# Patient Record
Sex: FEMALE | Race: WHITE | NOT HISPANIC OR LATINO | Employment: OTHER | URBAN - METROPOLITAN AREA
[De-identification: names, ages, dates, MRNs, and addresses within clinical notes are randomized per-mention and may not be internally consistent; named-entity substitution may affect disease eponyms.]

---

## 2017-08-10 ENCOUNTER — HOSPITAL ENCOUNTER (OUTPATIENT)
Dept: MAMMOGRAPHY | Facility: CLINIC | Age: 62
Discharge: HOME | End: 2017-08-10
Payer: COMMERCIAL

## 2017-08-10 ENCOUNTER — TRANSCRIBE ORDERS (OUTPATIENT)
Dept: MAMMOGRAPHY | Facility: CLINIC | Age: 62
End: 2017-08-10

## 2017-08-10 DIAGNOSIS — Z12.31 ENCOUNTER FOR SCREENING MAMMOGRAM FOR MALIGNANT NEOPLASM OF BREAST: ICD-10-CM

## 2017-08-10 PROCEDURE — 77063 BREAST TOMOSYNTHESIS BI: CPT

## 2017-08-10 PROCEDURE — G0202 SCR MAMMO BI INCL CAD: HCPCS

## 2017-09-05 ENCOUNTER — ALLSCRIPTS OFFICE VISIT (OUTPATIENT)
Dept: OTHER | Facility: OTHER | Age: 62
End: 2017-09-05

## 2018-01-13 NOTE — PROGRESS NOTES
Assessment    1  Never a smoker   2  Bilateral hip pain (719 45) (M25 551,M25 552)   3  History of hip replacement (V43 64) (Z96 649)   4  Visit for gynecologic examination (V72 31) (Z01 419)   5  Arthritis (716 90) (M19 90)    Plan  Arthritis, Bilateral hip pain    · *1 - [facility]  Consult  Status: Hold  For - Scheduling  Requested for: [id number]  Care Summary provided  : Yes  Need for diphtheria-tetanus-pertussis (Tdap) vaccine    · Adacel 5-2-15 5 LF-MCG/0 5 Intramuscular Suspension  Visit for gynecologic examination    · (Q) THINPREP TIS PAP RFX HPV; Status:Active - Retrospective By Protocol  Authorization; Requested for:[id number];   : Postmenopausal   · Follow Up in 2 Years Evaluation and Treatment  Follow-up  Status: Hold For - Scheduling   Requested for: [id number]   · Follow-up visit in 1 year Evaluation and Treatment  Follow-up  Status: Hold For -  Scheduling  Requested for: [id number]    Discussion/Summary  health maintenance visit healthy adult female Currently, she eats an adequate diet and has an inadequate exercise regimen  the risks and benefits of cervical cancer screening were discussed Pap test with reflex HPV testing was done today Breast cancer screening: the risks and benefits of breast cancer screening were discussed, self breast exam technique was taught, monthly self breast exam was advised, mammogram has been ordered and the next mammogram is due 8/16/2016  Colorectal cancer screening: the risks and benefits of colorectal cancer screening were discussed and the next colonoscopy is due 2025  Osteoporosis screening: bone mineral density testing is not indicated  The risks and benefits of immunizations were discussed, immunizations are needed and immunizations will be given as outlined in the orders   Advice and education were given regarding nutrition, aerobic exercise, weight bearing exercise, weight loss, calcium supplements and vitamin D supplements  Patient discussion: discussed with the patient  Gyn exam - PAP done, SBE exam advised monthly, encouraged to take calcium with d 600 mg twice daily  weight bearing exercise, weight loss  arthritis and s/p hip replacement - see BRITTA Mata PT for hip pain and initiating exercise and weight loss  tdap - today  labs not covered per insurance, looking for new insurance  colon due 2025  pap today  mammo appt 8/16    f/u 1 year for physical or sooner if needed  Chief Complaint  Pt presents to the office for her annual GYN visit  colon due 09/29/25  mammo due 08/07/16      History of Present Illness  HM, Adult Female: The patient is being seen for a gynecology evaluation  The last health maintenance visit was unknown  General Health: The patient's health since the last visit is described as good  She does not have regular dental visits  She denies vision problems  Vision care includes wearing glasses and no recent eye examination  She denies hearing loss  Lifestyle:  She consumes a diverse and healthy diet  She exercises regularly  She does not use tobacco  She denies alcohol use  She denies drug use  Reproductive health: the patient is postmenopausal   she reports normal menses  pregnancy history: G 0  Screening:   HPI: Patient here for gyn exam      c/o having b/l hips replaced one in 2013, one in 2014  In the past few months her hips are always achy, hurts to walk  So she doesn't  Continue to gain weight  Trying to watch her diet with little success in weight loss  Review of Systems    Constitutional: No fever, no chills, feels well, no tiredness, no recent weight gain or weight loss  Eyes: No complaints of eye pain, no red eyes, no eyesight problems, no discharge, no dry eyes, no itching of eyes  ENT: no complaints of earache, no loss of hearing, no nose bleeds, no nasal discharge, no sore throat, no hoarseness     Cardiovascular: No complaints of slow heart rate, no fast heart rate, no chest pain, no palpitations, no leg claudication, no lower extremity edema  Respiratory: No complaints of shortness of breath, no wheezing, no cough, no SOB on exertion, no orthopnea, no PND  Gastrointestinal: No complaints of abdominal pain, no constipation, no nausea or vomiting, no diarrhea, no bloody stools  Genitourinary: No complaints of dysuria, no incontinence, no pelvic pain, no dysmenorrhea, no vaginal discharge or bleeding  Musculoskeletal: arthralgias, but as noted in HPI  Integumentary: No complaints of skin rash or lesions, no itching, no skin wounds, no breast pain or lump  Neurological: No complaints of headache, no confusion, no convulsions, no numbness, no dizziness or fainting, no tingling, no limb weakness, no difficulty walking  Psychiatric: Not suicidal, no sleep disturbance, no anxiety or depression, no change in personality, no emotional problems  Endocrine: No complaints of proptosis, no hot flashes, no muscle weakness, no deepening of the voice, no feelings of weakness  Hematologic/Lymphatic: No complaints of swollen glands, no swollen glands in the neck, does not bleed easily, does not bruise easily  Active Problems    1  Allergic rhinitis due to pollen (477 0) (J30 1)   2  Arthritis (716 90) (M19 90)   3  's permit physical examination (V70 3) (Z02 4)   4  Encounter for screening for malignant neoplasm of colon (V76 51) (Z12 11)   5  History of hip replacement (V43 64) (Z96 649)   6  Obesity (278 00) (E66 9)   7   Visit for screening mammogram (6 12) (Z12 31)    Past Medical History    · History of Blepharitis of eyelid of left eye (373 00) (H01 006)   · History of Neck strain (847 0) (S16 1XXA)    Surgical History    · History of Total Hip Replacement   · History of Total Hip Replacement   · History of Tubal Ligation    Family History  Mother    · Family history of malignant neoplasm of kidney (V16 51) (Z80 51)   · Patient's mother is  (V19 8) (Z84 89)  Father    · Family history of lung cancer (V16 1) (Z80 1)   · Patient's father is  (V24 11) (Z80 80)    Social History    · Always uses seat belt   · Daily caffeine consumption, 1 serving a day   · coffee   · Has smoke detectors   · Never a smoker   · No drug use   · Non drinker / no alcohol use    Current Meds   1  No Reported Medications Recorded    Allergies    1  No Known Drug Allergies    2  No Known Environmental Allergies   3  No Known Food Allergies    Vitals   Recorded: 92BHN0958 27:22NU   Systolic 383, RUE, Sitting   Diastolic 64, RUE, Sitting   Heart Rate 76, R Radial   Pulse Quality Normal, R Radial   Respiration Quality Normal   Respiration 16   Height 5 ft 1 25 in   Weight 191 lb 6 4 oz   BMI Calculated 35 87   BSA Calculated 1 86     Physical Exam    Constitutional   General appearance: No acute distress, well appearing and well nourished  Neck   Neck: Supple, symmetric, trachea midline, no masses  Thyroid: Normal, no thyromegaly  Pulmonary   Respiratory effort: No increased work of breathing or signs of respiratory distress  Palpation of chest: Normal     Auscultation of lungs: Clear to auscultation  Cardiovascular   Palpation of heart: Normal PMI, no thrills  Auscultation of heart: Normal rate and rhythm, normal S1 and S2, no murmurs  Pedal pulses: 2+ bilaterally  Examination of extremities for edema and/or varicosities: Normal     Chest   Breasts: Normal, no dimpling or skin changes appreciated  Palpation of breasts and axillae: Normal, no masses palpated  Abdomen   Abdomen: Non-tender, no masses  Liver and spleen: No hepatomegaly or splenomegaly  Examination for hernias: No hernia appreciated  Genitourinary   External genitalia and vagina: Normal, no lesions appreciated  Cervix: Normal, no lesions  Uterus: Normal size, no tenderness, no masses  Adnexa/Parametria: Normal, no masses or tenderness      Lymphatic   Palpation of lymph nodes in neck: No lymphadenopathy  Palpation of lymph nodes in axillae: No lymphadenopathy  Palpation of lymph nodes in groin: No lymphadenopathy  Musculoskeletal   Gait and station: Normal     Skin   Skin and subcutaneous tissue: Normal without rashes or lesions  Palpation of skin and subcutaneous tissue: Normal turgor  Neurologic   Cranial nerves: Cranial nerves II-XII intact  Reflexes: 2+ and symmetric  Psychiatric   Judgment and insight: Normal     Mood and affect: Normal        Health Management  Encounter for screening for malignant neoplasm of colon   COLONOSCOPY; every 10 years; Last [id number]; Next Due: [id number]; Active    Signatures   Electronically signed by : DEREK Dietrich;  Aug  9 2016 10:56AM EST                       (Author)    Electronically signed by : GAYATHRI Leigh ; Aug  9 2016 11:01AM EST                       (Author)

## 2018-01-15 NOTE — MISCELLANEOUS
Message  Return to work or school:        Patient is able to exercise at [place] [address] gym  Kirsten CHAVEZ  Signatures   Electronically signed by :  Adrianna oDuglass, ; Sep  1 2017  2:04PM EST                       (Author)

## 2018-01-22 VITALS — WEIGHT: 187.4 LBS | BODY MASS INDEX: 35.38 KG/M2 | HEIGHT: 61 IN

## 2018-04-11 ENCOUNTER — OFFICE VISIT (OUTPATIENT)
Dept: FAMILY MEDICINE CLINIC | Facility: CLINIC | Age: 63
End: 2018-04-11
Payer: COMMERCIAL

## 2018-04-11 VITALS
HEART RATE: 72 BPM | HEIGHT: 61 IN | RESPIRATION RATE: 16 BRPM | BODY MASS INDEX: 35.74 KG/M2 | DIASTOLIC BLOOD PRESSURE: 80 MMHG | SYSTOLIC BLOOD PRESSURE: 122 MMHG | WEIGHT: 189.3 LBS

## 2018-04-11 DIAGNOSIS — M17.12 OSTEOARTHRITIS OF LEFT KNEE, UNSPECIFIED OSTEOARTHRITIS TYPE: Primary | ICD-10-CM

## 2018-04-11 DIAGNOSIS — M77.8 THUMB TENDONITIS: ICD-10-CM

## 2018-04-11 PROCEDURE — 99213 OFFICE O/P EST LOW 20 MIN: CPT | Performed by: PHYSICIAN ASSISTANT

## 2018-04-11 NOTE — PROGRESS NOTES
Assessment/Plan:    1  Osteoarthritis of left knee, unspecified osteoarthritis type    - start walking, lose weight, count calories, see if you can start PT with [facility]  Consider payment plan  - Ambulatory referral to Physical Therapy; Future    2  Thumb tendonitis    - rest hand, ice after work  - wear a thumb spica splint    F/u full physical  F/u as needed          Subjective:   Chief Complaint   Patient presents with    Left knee cracking      Patient ID: David Bakrer is a 65 y o  female  Patient here c/o bad knee cracking for as long as she can remember, left  Not swollen,  just "very loud cracking"  The other day at [employer] felt it almost "give out" underneath her  Knows "I weight too much, can't afford a gym, cant afford anything, so I am doing nothing"    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  Noticed left hand thumb aches with use  Dull ache, worse with movement  Takes no OTC, doing nothing to make it better  R handed  Denies trauma  Denies swelling, bruising  The following portions of the patient's history were reviewed and updated as appropriate: allergies, current medications, past family history, past medical history, past social history, past surgical history and problem list     Past Medical History:   Diagnosis Date    Blepharitis of eyelid of left eye      Past Surgical History:   Procedure Laterality Date    DENTAL SURGERY      [place] teeth extraction    HIP ARTHROPLASTY      Right    HIP ARTHROSCOPY      Left    TUBAL LIGATION       Family History   Problem Relation Age of Onset    Kidney cancer Mother     Lung cancer Father     Mental illness Cousin     Substance Abuse Neg Hx      Social History     Social History    Marital status: Single     Spouse name: N/A    Number of children: N/A    Years of education: N/A     Occupational History    Not on file       Social History Main Topics    Smoking status: Never Smoker    Smokeless tobacco: Never Used    Alcohol use No  Drug use: No    Sexual activity: Not on file     Other Topics Concern    Not on file     Social History Narrative    No narrative on file     No current outpatient prescriptions on file  Review of Systems          Objective:    Vitals:    04/11/18 1100   BP: 122/80   BP Location: Left arm   Patient Position: Sitting   Cuff Size: Standard   Pulse: 72   Resp: 16   Weight: 85 9 kg (189 lb 4 8 oz)   Height: 5' 1" (1 549 m)        Physical Exam   Constitutional: She is oriented to person, place, and time  She appears well-developed and well-nourished  Cardiovascular: Normal rate, regular rhythm and normal heart sounds  Pulmonary/Chest: Effort normal and breath sounds normal    Musculoskeletal: Normal range of motion  She exhibits no edema, tenderness or deformity  L knee full ROM, ligaments intact, negative mcmurrays, no crepitus, effusion, no "cracking"  L hand full ROM, full strength no abnormality  Positive Finklesteins   Neurological: She is alert and oriented to person, place, and time  Skin: Skin is warm  Psychiatric: She has a normal mood and affect

## 2018-04-16 ENCOUNTER — EVALUATION (OUTPATIENT)
Dept: PHYSICAL THERAPY | Facility: CLINIC | Age: 63
End: 2018-04-16
Payer: COMMERCIAL

## 2018-04-16 DIAGNOSIS — M17.12 OSTEOARTHRITIS OF LEFT KNEE, UNSPECIFIED OSTEOARTHRITIS TYPE: Primary | ICD-10-CM

## 2018-04-16 PROCEDURE — G8979 MOBILITY GOAL STATUS: HCPCS

## 2018-04-16 PROCEDURE — G8978 MOBILITY CURRENT STATUS: HCPCS

## 2018-04-16 PROCEDURE — 97161 PT EVAL LOW COMPLEX 20 MIN: CPT

## 2018-04-16 PROCEDURE — 97110 THERAPEUTIC EXERCISES: CPT

## 2018-04-16 NOTE — PROGRESS NOTES
PT Evaluation     Today's date: 2018  Patient name: Karla Piedra  : 1955  MRN: [id number]  Referring provider: Kristy Whipple PA-C  Dx:   Encounter Diagnosis     ICD-10-CM    1  Osteoarthritis of left knee, unspecified osteoarthritis type M17 12 Ambulatory referral to Physical Therapy                  Assessment/Plan   Patient presents to PT with L knee Oa  Patient with decreased L knee strength, decreased hip strength, and increased pain with functional activities  Patient may benefit from skilled PT in order to address the above impairments, progress patient toward pain-free prior level of function, and establish home exercise program  Thank you for this referral   ST  Improve knee strength by 1/2 grade within 3 weeks  2  Increase flexibility by 25% within 3 weeks  LT  Pt will be independent with HEP by discharge  2  Pt will return to pain-free PLOF by discharge  Plan: 2-3x/week for 4-6 weeks      Subjective   Pt reports her L knee started to bother her last month  Pt reports noticing some cracking in the knee  Notes weakness in the knee     Pain:   Worst: 9/10   Best: 0/10   Average: 7/10   Aggravating factors: walking  Alleviating factors: resting  Previous injury: none  Imaging: none  Goals: get back to walking, lose weight      Objective   Strength testing L:  Extension: 4/5  Flexion: 3+/5    ROM:  Flexion: 127 deg B/L  Extension: hyper 1 deg B/L    Kernig sign: negative  Ely's sign: positive    Palpation: tenderness along medial and lateral joint lines, tenderness over patellar tendon    Patellar mobility: normal mobility, painful medial/lateral    Trendelenburg L: positive for trunk lean  Trendelenburg R: negative        Precautions: B/L hip replacements    Daily Treatment Diary     Manual              PRN                                                                     Exercise Diary              Quad sets 5"x10            SAQ 2x10            Standing hamstring curls 2x10            Standing abduction 2x10            Bike NV            SLR NV            LAQ NV                                                                                                                                                                                         Modalities  4/16            CP PRN

## 2018-04-18 ENCOUNTER — TELEPHONE (OUTPATIENT)
Dept: FAMILY MEDICINE CLINIC | Facility: CLINIC | Age: 63
End: 2018-04-18

## 2018-04-20 ENCOUNTER — APPOINTMENT (OUTPATIENT)
Dept: PHYSICAL THERAPY | Facility: CLINIC | Age: 63
End: 2018-04-20
Payer: COMMERCIAL

## 2018-04-25 ENCOUNTER — OFFICE VISIT (OUTPATIENT)
Dept: PHYSICAL THERAPY | Facility: CLINIC | Age: 63
End: 2018-04-25
Payer: COMMERCIAL

## 2018-04-25 DIAGNOSIS — M17.12 OSTEOARTHRITIS OF LEFT KNEE, UNSPECIFIED OSTEOARTHRITIS TYPE: Primary | ICD-10-CM

## 2018-04-25 PROCEDURE — 97110 THERAPEUTIC EXERCISES: CPT

## 2018-04-25 PROCEDURE — 97112 NEUROMUSCULAR REEDUCATION: CPT

## 2018-04-25 NOTE — PROGRESS NOTES
Daily Note     Today's date: 2018  Patient name: Santy Goel  : 1955  MRN: [id number]  Referring provider: Luzmaria Gu PA-C  Dx:   Encounter Diagnosis     ICD-10-CM    1  Osteoarthritis of left knee, unspecified osteoarthritis type M17 12                   Subjective: Pt reports she is feeling pretty good  Objective: See treatment diary below      Assessment: Pt tolerating addition of new therex  Notes improvement in knee mobility and pain after bike  Pt will continue to benefit from skilled PT  Plan: Continue per plan of care        Precautions: B/L hip replacements    Daily Treatment Diary     Manual             PRN                                                                     Exercise Diary             Quad sets 5"x10 5" x15           SAQ 2x10 2x10           Standing hamstring curls 2x10 2x10 1#           Standing abduction 2x10 2x10 1#           Bike NV 5'           SLR NV NV           LAQ NV NV           Step ups  2x10 4"            Heel slides  x10                                                                                                                                                              Modalities             CP PRN PRN

## 2018-04-27 ENCOUNTER — OFFICE VISIT (OUTPATIENT)
Dept: PHYSICAL THERAPY | Facility: CLINIC | Age: 63
End: 2018-04-27
Payer: COMMERCIAL

## 2018-04-27 DIAGNOSIS — M17.12 OSTEOARTHRITIS OF LEFT KNEE, UNSPECIFIED OSTEOARTHRITIS TYPE: Primary | ICD-10-CM

## 2018-04-27 PROCEDURE — 97112 NEUROMUSCULAR REEDUCATION: CPT

## 2018-04-27 PROCEDURE — 97110 THERAPEUTIC EXERCISES: CPT

## 2018-04-27 NOTE — PROGRESS NOTES
Daily Note     Today's date: 2018  Patient name: Evan Collins  : 1955  MRN: [id number]  Referring provider: Megan Ram PA-C  Dx:   Encounter Diagnosis     ICD-10-CM    1  Osteoarthritis of left knee, unspecified osteoarthritis type M17 12                   Subjective: Pt reports her L knee feels sore with rainy weather today  Objective: See treatment diary below      Assessment: New exercises and exercise progressions performed w/o complaint  Will monitor  Pt will continue to benefit from skilled PT  Plan: Continue per plan of care        Precautions: B/L hip replacements    Daily Treatment Diary     Manual            PRN                                                                     Exercise Diary            Quad sets 5"x10 5" x15 5"x20          SAQ 2x10 2x10 2x10          Standing hamstring curls 2x10 2x10 1# 2x10  1#          Standing abduction 2x10 2x10 1# 2x10  1#          Bike NV 5' 5'          SLR NV NV x10          LAQ NV NV 2x10          Step ups  2x10 4"  2x10  4"          Heel slides  x10 2x10                                                                                                                                                             Modalities            CP PRN PRN PRN

## 2018-05-03 ENCOUNTER — OFFICE VISIT (OUTPATIENT)
Dept: PHYSICAL THERAPY | Facility: CLINIC | Age: 63
End: 2018-05-03
Payer: COMMERCIAL

## 2018-05-03 DIAGNOSIS — M17.12 OSTEOARTHRITIS OF LEFT KNEE, UNSPECIFIED OSTEOARTHRITIS TYPE: Primary | ICD-10-CM

## 2018-05-03 PROCEDURE — 97112 NEUROMUSCULAR REEDUCATION: CPT

## 2018-05-03 PROCEDURE — 97110 THERAPEUTIC EXERCISES: CPT

## 2018-05-03 NOTE — PROGRESS NOTES
Daily Note     Today's date: 5/3/2018  Patient name: Catracho Ontiveros  : 1955  MRN: [id number]  Referring provider: Camila Reid PA-C  Dx:   Encounter Diagnosis     ICD-10-CM    1  Osteoarthritis of left knee, unspecified osteoarthritis type M17 12                   Subjective: Pt reports her L knee is feeling pretty good  Notes some hip pain today  Objective: See treatment diary below      Assessment: Pt tolerating progression of current exercise program without complaints of pain  Declined CP at this time  Pt will continue to benefit from skilled PT  Plan: Continue per plan of care        Precautions: B/L hip replacements    Daily Treatment Diary     Manual  4/16 4/25 4/27 5/3         PRN                                                                     Exercise Diary  4/16 4/25 4/27 5/3         Quad sets 5"x10 5" x15 5"x20 5"x20         SAQ 2x10 2x10 2x10 3x10 1#         Standing hamstring curls 2x10 2x10 1# 2x10  1# 3x10 1#         Standing abduction 2x10 2x10 1# 2x10  1# 3x10 1#         Bike NV 5' 5' 6'         SLR NV NV x10 2x8         LAQ NV NV 2x10 3x10         Step ups  2x10 4"  2x10  4" 2x10 6"         Heel slides  x10 2x10 x25                                                                                                                                                            Modalities  4/16 4/25 4/27 5/3         CP PRN PRN PRN PRN

## 2018-05-10 ENCOUNTER — OFFICE VISIT (OUTPATIENT)
Dept: FAMILY MEDICINE CLINIC | Facility: CLINIC | Age: 63
End: 2018-05-10
Payer: COMMERCIAL

## 2018-05-10 VITALS
HEART RATE: 64 BPM | BODY MASS INDEX: 35.8 KG/M2 | RESPIRATION RATE: 16 BRPM | WEIGHT: 189.6 LBS | DIASTOLIC BLOOD PRESSURE: 72 MMHG | TEMPERATURE: 98.3 F | HEIGHT: 61 IN | SYSTOLIC BLOOD PRESSURE: 112 MMHG

## 2018-05-10 DIAGNOSIS — J02.9 SORE THROAT: ICD-10-CM

## 2018-05-10 DIAGNOSIS — R05.9 COUGH: ICD-10-CM

## 2018-05-10 DIAGNOSIS — J30.89 SEASONAL ALLERGIC RHINITIS DUE TO OTHER ALLERGIC TRIGGER: Primary | ICD-10-CM

## 2018-05-10 LAB — S PYO AG THROAT QL: NEGATIVE

## 2018-05-10 PROCEDURE — 99213 OFFICE O/P EST LOW 20 MIN: CPT | Performed by: PHYSICIAN ASSISTANT

## 2018-05-10 PROCEDURE — 87880 STREP A ASSAY W/OPTIC: CPT | Performed by: PHYSICIAN ASSISTANT

## 2018-05-10 RX ORDER — PREDNISONE 20 MG/1
20 TABLET ORAL 2 TIMES DAILY WITH MEALS
Qty: 10 TABLET | Refills: 0 | Status: SHIPPED | OUTPATIENT
Start: 2018-05-10 | End: 2018-07-23 | Stop reason: ALTCHOICE

## 2018-05-10 RX ORDER — OMEGA-3/DHA/EPA/FISH OIL 60 MG-90MG
CAPSULE ORAL 3 TIMES DAILY
COMMUNITY

## 2018-05-10 RX ORDER — CHOLECALCIFEROL (VITAMIN D3) 25 MCG
CAPSULE ORAL DAILY
COMMUNITY
End: 2021-12-10

## 2018-05-10 RX ORDER — ASCORBIC ACID 500 MG
500 TABLET ORAL DAILY
COMMUNITY

## 2018-05-10 RX ORDER — LORATADINE 10 MG/1
10 TABLET ORAL DAILY
Qty: 30 TABLET | Refills: 0 | Status: SHIPPED | OUTPATIENT
Start: 2018-05-10

## 2018-05-10 NOTE — PROGRESS NOTES
Assessment/Plan:    1  Seasonal allergic rhinitis due to other allergic trigger    - acute allergies, start prednisone 20 mg 1 tab twice daily for 5 days, also start Claritin 10 mg once daily until summer  Allergy precautions discussed  Close windows  - predniSONE 20 mg tablet; Take 1 tablet (20 mg total) by mouth 2 (two) times a day with meals  Dispense: 10 tablet; Refill: 0  - loratadine (CLARITIN) 10 mg tablet; Take 1 tablet (10 mg total) by mouth daily  Dispense: 30 tablet; Refill: 0    2  Sore throat    - due to allergies  - POCT rapid strepA - negative     3  Cough    - due to allergies  - Peak flow    f/u for physical  f/u as needed        Subjective:   Chief Complaint   Patient presents with    Cough    Nasal Congestion      Patient ID: Catracho Ontiveros is a 65 y o  female  Tuesday started with coughing lept patient up all night, runny nose off and on, clear mucus, sore throat, swollen glands  "Hit me out of no where"  No fever, chills  Took vitamin and an allergy pills with no relief  Sleeping with windows open recently  The following portions of the patient's history were reviewed and updated as appropriate: allergies, current medications, past family history, past medical history, past social history, past surgical history and problem list     Past Medical History:   Diagnosis Date    Blepharitis of eyelid of left eye      Past Surgical History:   Procedure Laterality Date    DENTAL SURGERY      [place] teeth extraction    HIP ARTHROPLASTY      Right    HIP ARTHROSCOPY      Left    TUBAL LIGATION       Family History   Problem Relation Age of Onset    Kidney cancer Mother     Lung cancer Father     Mental illness Cousin     Substance Abuse Neg Hx      Social History     Social History    Marital status: Single     Spouse name: N/A    Number of children: N/A    Years of education: N/A     Occupational History    Not on file       Social History Main Topics    Smoking status: Never Smoker    Smokeless tobacco: Never Used    Alcohol use No    Drug use: No    Sexual activity: Not on file     Other Topics Concern    Not on file     Social History Narrative    Always uses a seatbelt    Caffeine use- 1 cup of coffee per day    Has smoke detectors           Current Outpatient Prescriptions:     ascorbic acid (VITAMIN C) 500 mg tablet, Take 500 mg by mouth daily, Disp: , Rfl:     Cholecalciferol (VITAMIN D-3) 1000 units CAPS, Take by mouth daily, Disp: , Rfl:     Omega-3 Fatty Acids (FISH OIL) 500 MG CAPS, Take by mouth 3 (three) times a day, Disp: , Rfl:     Review of Systems          Objective:    Vitals:    05/10/18 1224   BP: 112/72   BP Location: Left arm   Patient Position: Sitting   Cuff Size: Adult   Pulse: 64   Resp: 16   Temp: 98 3 °F (36 8 °C)   Weight: 86 kg (189 lb 9 6 oz)   Height: 5' 1" (1 549 m)        Physical Exam   Constitutional: She is oriented to person, place, and time  She appears well-developed and well-nourished  HENT:   Head: Normocephalic and atraumatic  Right Ear: Tympanic membrane, external ear and ear canal normal    Left Ear: Tympanic membrane, external ear and ear canal normal    Nose: Mucosal edema and rhinorrhea present  Mouth/Throat: Oropharynx is clear and moist  No oropharyngeal exudate or posterior oropharyngeal erythema  turbinates pink and boggy with congestion, clear mucus   Cardiovascular: Normal rate, regular rhythm and normal heart sounds  Pulmonary/Chest: Effort normal and breath sounds normal    Lymphadenopathy:     She has no cervical adenopathy  Neurological: She is alert and oriented to person, place, and time  Skin: Skin is warm  Psychiatric: She has a normal mood and affect   Judgment normal

## 2018-07-18 ENCOUNTER — TRANSCRIBE ORDERS (OUTPATIENT)
Dept: ADMINISTRATIVE | Facility: HOSPITAL | Age: 63
End: 2018-07-18

## 2018-07-18 DIAGNOSIS — Z12.31 ENCOUNTER FOR SCREENING MAMMOGRAM FOR MALIGNANT NEOPLASM OF BREAST: Primary | ICD-10-CM

## 2018-07-23 ENCOUNTER — HOSPITAL ENCOUNTER (OUTPATIENT)
Dept: RADIOLOGY | Facility: HOSPITAL | Age: 63
Discharge: HOME | End: 2018-07-23
Payer: COMMERCIAL

## 2018-07-23 ENCOUNTER — OFFICE VISIT (OUTPATIENT)
Dept: FAMILY MEDICINE CLINIC | Facility: CLINIC | Age: 63
End: 2018-07-23
Payer: COMMERCIAL

## 2018-07-23 VITALS
HEART RATE: 75 BPM | SYSTOLIC BLOOD PRESSURE: 122 MMHG | WEIGHT: 198.6 LBS | HEIGHT: 61 IN | RESPIRATION RATE: 16 BRPM | DIASTOLIC BLOOD PRESSURE: 82 MMHG | BODY MASS INDEX: 37.49 KG/M2

## 2018-07-23 DIAGNOSIS — M25.551 HIP PAIN, BILATERAL: Primary | ICD-10-CM

## 2018-07-23 DIAGNOSIS — M25.552 HIP PAIN, BILATERAL: ICD-10-CM

## 2018-07-23 DIAGNOSIS — M25.551 HIP PAIN, BILATERAL: ICD-10-CM

## 2018-07-23 DIAGNOSIS — M25.552 HIP PAIN, BILATERAL: Primary | ICD-10-CM

## 2018-07-23 PROCEDURE — 73523 X-RAY EXAM HIPS BI 5/> VIEWS: CPT

## 2018-07-23 PROCEDURE — 99213 OFFICE O/P EST LOW 20 MIN: CPT | Performed by: PHYSICIAN ASSISTANT

## 2018-07-23 PROCEDURE — 3008F BODY MASS INDEX DOCD: CPT | Performed by: PHYSICIAN ASSISTANT

## 2018-07-23 NOTE — PROGRESS NOTES
Assessment/Plan:    1  Hip pain, bilateral    - get XR per patient request, she will call and schedule with ortho, advised to get CDto take to ortho for their review  advised this may all be degenerative changes in the lumbar sacral region  Walking and stretching is good for this  - XR hip/pelv 2-3 vws left if performed; Future  - XR hip/pelv 2-3 vws right if performed; Future    F/u as scheduled for physical  F/u as needed    Subjective:   Chief Complaint   Patient presents with    Hip Pain     Both side       Patient ID: Efra Velasco is a 65 y o  female  Patient here because has a scheduled appt with [facility], [address] for both hips  Having a pinching sensation in both hips, notices only with walking  Gets more tired with walking  No pain at rest, pain with walking and sitting to stand and stand to sit  Tried Advil and aspirin and tylenol with relief  Have been bothering the patient the entire year  Do not wake patient up from sleep  Limiting exercise and movement  Seems to be getting worse as time goes on  Hurt to touch  Requesting XR before appointment  Had both hips replaced back in 2013, and 2014  Saw PT back in May for left knee pain but no improvement        Hip Pain          The following portions of the patient's history were reviewed and updated as appropriate: allergies, current medications, past family history, past medical history, past social history, past surgical history and problem list     Past Medical History:   Diagnosis Date    Blepharitis of eyelid of left eye      Past Surgical History:   Procedure Laterality Date    DENTAL SURGERY      [place] teeth extraction    HIP ARTHROPLASTY      Right    HIP ARTHROSCOPY      Left    TUBAL LIGATION       Family History   Problem Relation Age of Onset    Kidney cancer Mother     Lung cancer Father     Mental illness Cousin     Substance Abuse Neg Hx     Alcohol abuse Neg Hx      Social History     Social History    Marital status: Single     Spouse name: N/A    Number of children: N/A    Years of education: N/A     Occupational History    Not on file  Social History Main Topics    Smoking status: Never Smoker    Smokeless tobacco: Never Used    Alcohol use No    Drug use: No    Sexual activity: Not on file     Other Topics Concern    Not on file     Social History Narrative    Always uses a seatbelt    Caffeine use- 1 cup of coffee per day    Has smoke detectors           Current Outpatient Prescriptions:     ascorbic acid (VITAMIN C) 500 mg tablet, Take 500 mg by mouth daily, Disp: , Rfl:     Cholecalciferol (VITAMIN D-3) 1000 units CAPS, Take by mouth daily, Disp: , Rfl:     loratadine (CLARITIN) 10 mg tablet, Take 1 tablet (10 mg total) by mouth daily, Disp: 30 tablet, Rfl: 0    Omega-3 Fatty Acids (FISH OIL) 500 MG CAPS, Take by mouth 3 (three) times a day, Disp: , Rfl:     Review of Systems          Objective:    Vitals:    07/23/18 0956   BP: 122/82   BP Location: Left arm   Patient Position: Sitting   Cuff Size: Standard   Pulse: 75   Resp: 16   Weight: 90 1 kg (198 lb 9 6 oz)   Height: 5' 1" (1 549 m)        Physical Exam   Constitutional: She is oriented to person, place, and time  She appears well-developed and well-nourished  Cardiovascular: Normal rate, regular rhythm and normal heart sounds  Pulmonary/Chest: Effort normal and breath sounds normal    Musculoskeletal:   B/l hips nontender, full ROM, full strength, right posterior hip over SI joint tender to palpation  Yet walking with antalgic gait due to "pain in right hip"   Neurological: She is alert and oriented to person, place, and time  Psychiatric: She has a normal mood and affect   Judgment normal

## 2018-07-24 ENCOUNTER — TRANSCRIBE ORDERS (OUTPATIENT)
Dept: MAMMOGRAPHY | Facility: CLINIC | Age: 63
End: 2018-07-24

## 2018-07-26 ENCOUNTER — TELEPHONE (OUTPATIENT)
Dept: FAMILY MEDICINE CLINIC | Facility: CLINIC | Age: 63
End: 2018-07-26

## 2018-07-26 NOTE — TELEPHONE ENCOUNTER
Patient would like an order to see Dimple valdes   She does not want to go to [place]      She will wait until Monday, as I am not here Friday

## 2018-07-26 NOTE — TELEPHONE ENCOUNTER
Pt called and was wondering if you looked at her results yet of her Xray?      Call back number: [phone]

## 2018-07-27 DIAGNOSIS — M25.552 PAIN OF BOTH HIP JOINTS: Primary | ICD-10-CM

## 2018-07-27 DIAGNOSIS — M25.551 PAIN OF BOTH HIP JOINTS: Primary | ICD-10-CM

## 2018-07-31 NOTE — TELEPHONE ENCOUNTER
Patient said her hip does not hurt anymore, but she knows that it will soon   She is going to have xrays done [place]

## 2018-08-01 ENCOUNTER — TELEPHONE (OUTPATIENT)
Dept: FAMILY MEDICINE CLINIC | Facility: CLINIC | Age: 63
End: 2018-08-01

## 2018-08-01 NOTE — TELEPHONE ENCOUNTER
Calorie count 1,200 a day, exercise 45-60 minutes a day combo of cardio/weight bearing more than 4 days a week, hydration with adequate water, limit carbs/sugars, sleep 7-8 hours a day  Records will be added

## 2018-08-01 NOTE — TELEPHONE ENCOUNTER
Patient dropped off paperwork from [facility] that she would like to include in her chart regarding her hip pain  She would also like any advice on how to get rid of her belly fat

## 2018-08-06 ENCOUNTER — HOSPITAL ENCOUNTER (OUTPATIENT)
Dept: MAMMOGRAPHY | Facility: CLINIC | Age: 63
Discharge: HOME | End: 2018-08-06
Payer: COMMERCIAL

## 2018-08-06 DIAGNOSIS — Z12.31 ENCOUNTER FOR SCREENING MAMMOGRAM FOR MALIGNANT NEOPLASM OF BREAST: ICD-10-CM

## 2018-08-06 PROCEDURE — 77063 BREAST TOMOSYNTHESIS BI: CPT

## 2018-08-06 PROCEDURE — 77067 SCR MAMMO BI INCL CAD: CPT

## 2018-08-09 NOTE — TELEPHONE ENCOUNTER
Now patient wants to know if she can use a chair machine at [employer] that is designed to reduce belly fat  It is a machine that you lift with your feet and patient is asking if she should do this with the fact that she has hip pain  Please respond to another staff member  I will be out Friday and Monday  Danita Gill

## 2018-10-01 ENCOUNTER — CLINICAL SUPPORT (OUTPATIENT)
Dept: FAMILY MEDICINE CLINIC | Facility: CLINIC | Age: 63
End: 2018-10-01
Payer: COMMERCIAL

## 2018-10-01 DIAGNOSIS — Z23 NEED FOR PROPHYLACTIC VACCINATION AND INOCULATION AGAINST INFLUENZA: Primary | ICD-10-CM

## 2018-10-01 PROCEDURE — 90682 RIV4 VACC RECOMBINANT DNA IM: CPT

## 2018-10-01 PROCEDURE — 90471 IMMUNIZATION ADMIN: CPT

## 2018-10-10 ENCOUNTER — OFFICE VISIT (OUTPATIENT)
Dept: FAMILY MEDICINE CLINIC | Facility: CLINIC | Age: 63
End: 2018-10-10
Payer: COMMERCIAL

## 2018-10-10 ENCOUNTER — HOSPITAL ENCOUNTER (OUTPATIENT)
Dept: RADIOLOGY | Facility: HOSPITAL | Age: 63
Discharge: HOME | End: 2018-10-10
Payer: COMMERCIAL

## 2018-10-10 VITALS
WEIGHT: 195.4 LBS | HEART RATE: 68 BPM | RESPIRATION RATE: 17 BRPM | DIASTOLIC BLOOD PRESSURE: 80 MMHG | BODY MASS INDEX: 36.92 KG/M2 | SYSTOLIC BLOOD PRESSURE: 120 MMHG

## 2018-10-10 DIAGNOSIS — M79.602 PAIN OF LEFT UPPER EXTREMITY: Primary | ICD-10-CM

## 2018-10-10 DIAGNOSIS — M79.602 PAIN OF LEFT UPPER EXTREMITY: ICD-10-CM

## 2018-10-10 PROCEDURE — 73060 X-RAY EXAM OF HUMERUS: CPT

## 2018-10-10 PROCEDURE — 99213 OFFICE O/P EST LOW 20 MIN: CPT | Performed by: PHYSICIAN ASSISTANT

## 2018-10-10 NOTE — PROGRESS NOTES
Assessment/Plan:    1  Pain of left upper extremity    - r/o fracture of humerus due to fall, if normal will refer to PT for rotator strain  - XR humerus left; Future  - Ambulatory referral to Physical Therapy; Future    F/u as needed  F/u as scheduled    Subjective:   Chief Complaint   Patient presents with    Arm Pain     both    Shoulder Pain     both       Patient ID: Anthony Alexandre is a 59 y o  female  Last week patient tripped over the cat and hit left shoulder then hit right shoulder  Had immediate pain, hurts to lay on  Can lay on her back normally, worse with laying on side  Able to do all normal ADL only painful with sleeping  Denies deformity, bruising, weakness  Not improving over the past week  The following portions of the patient's history were reviewed and updated as appropriate: allergies, current medications, past family history, past medical history, past social history, past surgical history and problem list     Past Medical History:   Diagnosis Date    Blepharitis of eyelid of left eye      Past Surgical History:   Procedure Laterality Date    DENTAL SURGERY      [place] teeth extraction    HIP ARTHROPLASTY      Right    HIP ARTHROSCOPY      Left    TUBAL LIGATION       Family History   Problem Relation Age of Onset    Kidney cancer Mother     Lung cancer Father     Mental illness Cousin     Substance Abuse Neg Hx     Alcohol abuse Neg Hx      Social History     Social History    Marital status: Single     Spouse name: N/A    Number of children: N/A    Years of education: N/A     Occupational History    Not on file       Social History Main Topics    Smoking status: Never Smoker    Smokeless tobacco: Never Used    Alcohol use No    Drug use: No    Sexual activity: Not on file     Other Topics Concern    Not on file     Social History Narrative    Always uses a seatbelt    Caffeine use- 1 cup of coffee per day    Has smoke detectors           Current Outpatient Prescriptions:     ascorbic acid (VITAMIN C) 500 mg tablet, Take 500 mg by mouth daily, Disp: , Rfl:     Cholecalciferol (VITAMIN D-3) 1000 units CAPS, Take by mouth daily, Disp: , Rfl:     loratadine (CLARITIN) 10 mg tablet, Take 1 tablet (10 mg total) by mouth daily, Disp: 30 tablet, Rfl: 0    Omega-3 Fatty Acids (FISH OIL) 500 MG CAPS, Take by mouth 3 (three) times a day, Disp: , Rfl:     Review of Systems          Objective:    Vitals:    10/10/18 1328   BP: 120/80   BP Location: Right arm   Patient Position: Sitting   Cuff Size: Standard   Pulse: 68   Resp: 17   Weight: 88 6 kg (195 lb 6 4 oz)        Physical Exam   Constitutional: She is oriented to person, place, and time  She appears well-developed and well-nourished  Cardiovascular: Normal rate, regular rhythm and normal heart sounds  Pulmonary/Chest: Effort normal and breath sounds normal    Musculoskeletal:   Left mid shaft humerus tender, pain with abduction, difficulty abducting to 90 degrees  Difficult with straight arm raise, pain with internal rotation and extension    No gross abnormality   Neurological: She is alert and oriented to person, place, and time  Psychiatric: She has a normal mood and affect   Judgment normal

## 2018-10-14 RX ORDER — ONDANSETRON 2 MG/ML
INJECTION INTRAMUSCULAR; INTRAVENOUS
Status: DISCONTINUED
Start: 2018-10-14 | End: 2018-10-14 | Stop reason: WASHOUT

## 2018-10-14 RX ORDER — NITROGLYCERIN 20 MG/100ML
INJECTION INTRAVENOUS
Status: DISCONTINUED
Start: 2018-10-14 | End: 2018-10-14 | Stop reason: WASHOUT

## 2018-10-15 ENCOUNTER — TELEPHONE (OUTPATIENT)
Dept: FAMILY MEDICINE CLINIC | Facility: CLINIC | Age: 63
End: 2018-10-15

## 2018-10-15 NOTE — TELEPHONE ENCOUNTER
----- Message from Mar Souza PA-C sent at 10/15/2018 12:19 PM EDT -----  Please let patient know there appears to be an abnormality where her clavicle meets with her shoulder, could be from the fall  I want her to see ortho for further evaluation of this  Thank      Left message to patient

## 2018-10-15 NOTE — TELEPHONE ENCOUNTER
I just [place] and [facility] a message  On the XR there appears to be an abnormality between the connection of the clavicle bone and the shoulder  I want her to see an ortho to have this evaluated further

## 2018-10-16 DIAGNOSIS — M25.512 ACUTE PAIN OF LEFT SHOULDER: Primary | ICD-10-CM

## 2018-10-16 DIAGNOSIS — R93.89 ABNORMAL X-RAY: ICD-10-CM

## 2018-10-17 ENCOUNTER — OFFICE VISIT (OUTPATIENT)
Dept: OBGYN CLINIC | Facility: CLINIC | Age: 63
End: 2018-10-17
Payer: COMMERCIAL

## 2018-10-17 VITALS
SYSTOLIC BLOOD PRESSURE: 123 MMHG | DIASTOLIC BLOOD PRESSURE: 77 MMHG | BODY MASS INDEX: 37.49 KG/M2 | HEART RATE: 69 BPM | HEIGHT: 61 IN | WEIGHT: 198.6 LBS

## 2018-10-17 DIAGNOSIS — M75.22 BICEPS TENDINITIS OF LEFT UPPER EXTREMITY: ICD-10-CM

## 2018-10-17 DIAGNOSIS — S46.112A LABRAL TEAR OF LONG HEAD OF LEFT BICEPS TENDON, INITIAL ENCOUNTER: ICD-10-CM

## 2018-10-17 PROCEDURE — 99203 OFFICE O/P NEW LOW 30 MIN: CPT | Performed by: ORTHOPAEDIC SURGERY

## 2018-10-17 RX ORDER — DIPHENOXYLATE HYDROCHLORIDE AND ATROPINE SULFATE 2.5; .025 MG/1; MG/1
1 TABLET ORAL DAILY
COMMUNITY

## 2018-10-17 NOTE — ASSESSMENT & PLAN NOTE
78-year-old female status post a fall  Her physical examination is consistent with a superior labral tear  We will get her going with some therapy  If she fails to have significant improvement I will see her back in six weeks

## 2018-10-17 NOTE — PROGRESS NOTES
Assessment:     1  Biceps tendinitis of left upper extremity    2  Labral tear of long head of left biceps tendon, initial encounter          Plan:     Problem List Items Addressed This Visit        Musculoskeletal and Integument    Biceps tendinitis of left upper extremity     60-year-old female with left biceps tendinitis following a fall  I recommended physical therapy and anti-inflammatories as needed  I will see her back in six weeks if she is continuing to have problems  Relevant Orders    Ambulatory referral to Physical Therapy    Labral tear of long head of left biceps tendon     60-year-old female status post a fall  Her physical examination is consistent with a superior labral tear  We will get her going with some therapy  If she fails to have significant improvement I will see her back in six weeks  Relevant Orders    Ambulatory referral to Physical Therapy           Patient ID: Thelma Peters is a 59 y o  female  Chief Complaint:  Left shoulder injury    HPI:  60-year-old female who fell in early October when she tripped over cap  She actually fell onto her right shoulder, based her left shoulder to try to grab and prevent her fall  Jonathan's her left shoulder that is hurting her  She was seen by her primary care provider who got x-rays which were abnormal   Patient complains of pain with her arm outstretched and lifting  Internal rotation can be painful  It is more the forward flexion with the lifting the bothers her the most   She has tried Tylenol and aspirin without any significant relief  She has not done any physical therapy  She denies any numbness or tingling  She describes it as an ache  The pain is primarily on the front of the shoulder  Patient's medical intake was reviewed          Allergy:  No Known Allergies    Medications:  all current active meds have been reviewed    Past Medical History:  Past Medical History:   Diagnosis Date    Blepharitis of eyelid of left eye        Past Surgical History:  Past Surgical History:   Procedure Laterality Date    DENTAL SURGERY      [place] teeth extraction    HIP ARTHROPLASTY      Right    HIP ARTHROSCOPY      Left    TUBAL LIGATION         Family History:  Family History   Problem Relation Age of Onset    Kidney cancer Mother     Lung cancer Father     Mental illness Cousin     Substance Abuse Neg Hx     Alcohol abuse Neg Hx        Social History:  History   Alcohol Use No     History   Drug Use No     History   Smoking Status    Never Smoker   Smokeless Tobacco    Never Used           ROS:  Review of Systems   Constitutional: Negative  HENT: Negative  Eyes: Negative  Respiratory: Negative  Gastrointestinal: Negative  Endocrine: Negative  Genitourinary: Negative  Musculoskeletal: Positive for arthralgias  Skin: Negative  Allergic/Immunologic: Negative  Neurological: Negative  Hematological: Negative  Psychiatric/Behavioral: Negative  Objective:  BP Readings from Last 1 Encounters:   10/17/18 123/77      Wt Readings from Last 1 Encounters:   10/17/18 90 1 kg (198 lb 9 6 oz)        BMI:   Estimated body mass index is 37 53 kg/m² as calculated from the following:    Height as of this encounter: 5' 1" (1 549 m)  Weight as of this encounter: 90 1 kg (198 lb 9 6 oz)  EXAM:   Physical Exam   Constitutional: She appears well-developed and well-nourished  No distress  HENT:   Head: Normocephalic and atraumatic  Eyes: Right eye exhibits no discharge  Left eye exhibits no discharge  Neck: Normal range of motion  Neck supple  No tracheal deviation present  Cardiovascular: Normal rate and regular rhythm  Pulmonary/Chest: Effort normal and breath sounds normal  No respiratory distress  She exhibits no tenderness  Abdominal: Soft  She exhibits no distension  There is no tenderness  Neurological: She is alert  Skin: Skin is warm and dry  She is not diaphoretic   No erythema  Psychiatric: She has a normal mood and affect  Her behavior is normal    Vitals reviewed  Right Shoulder Exam     Tenderness   The patient is experiencing no tenderness  Range of Motion   The patient has normal right shoulder ROM  Muscle Strength   The patient has normal right shoulder strength  Tests   Apprehension: negative  Cross Arm: negative  Hawkin's test: negative  Impingement: negative    Other   Erythema: absent  Sensation: normal  Pulse: present      Left Shoulder Exam     Tenderness   The patient is experiencing tenderness in the biceps tendon  Muscle Strength   The patient has normal left shoulder strength  Tests   Apprehension: negative  Drop Arm: negative  Impingement: positive    Other   Erythema: absent  Sensation: normal  Pulse: present     Comments:  Patient lacks 10° of active forward flexion, but has full passive forward flexion  She has good external rotation and internal rotation  Positive speed's test   Positive [place]'s test               Radiographs:  I have personally reviewed pertinent films in PACS and my interpretation is X-rays are reviewed which show some acromial clavicular joint arthritis  No fractures or dislocations are noted

## 2018-10-17 NOTE — ASSESSMENT & PLAN NOTE
16-year-old female with left biceps tendinitis following a fall  I recommended physical therapy and anti-inflammatories as needed  I will see her back in six weeks if she is continuing to have problems

## 2018-10-31 ENCOUNTER — EVALUATION (OUTPATIENT)
Dept: PHYSICAL THERAPY | Facility: CLINIC | Age: 63
End: 2018-10-31
Payer: COMMERCIAL

## 2018-10-31 DIAGNOSIS — M75.22 BICEPS TENDINITIS OF LEFT UPPER EXTREMITY: ICD-10-CM

## 2018-10-31 DIAGNOSIS — S46.112A LABRAL TEAR OF LONG HEAD OF LEFT BICEPS TENDON, INITIAL ENCOUNTER: ICD-10-CM

## 2018-10-31 PROCEDURE — G8990 OTHER PT/OT CURRENT STATUS: HCPCS | Performed by: PHYSICAL THERAPIST

## 2018-10-31 PROCEDURE — G8991 OTHER PT/OT GOAL STATUS: HCPCS | Performed by: PHYSICAL THERAPIST

## 2018-10-31 PROCEDURE — 97161 PT EVAL LOW COMPLEX 20 MIN: CPT | Performed by: PHYSICAL THERAPIST

## 2018-10-31 PROCEDURE — 97140 MANUAL THERAPY 1/> REGIONS: CPT | Performed by: PHYSICAL THERAPIST

## 2018-10-31 NOTE — PROGRESS NOTES
PT Evaluation     Today's date: 10/31/2018  Patient name: Na Lincoln  : 1955  MRN: [id number]  Referring provider: Shana Bagley MD  Dx:   Encounter Diagnosis     ICD-10-CM    1  Biceps tendinitis of left upper extremity M75 22 Ambulatory referral to Physical Therapy   2  Labral tear of long head of left biceps tendon, initial encounter S46 112A Ambulatory referral to Physical Therapy                  Assessment    Assessment details: Pt presents to outpatient PT with pain, decreased rom, decreased strength and decreased functional mobility  She will benefit from skilled PT to address these deficits in order to achieve her goals and maximize her functional mobility  Goals    Short Term Goals: Independent performance of initial hep  Decrease pain 2 points on VAS      Long Term Goals: Independent performance of comprehensive hep  Able to reach overhead with min pain  Performance of IADL's is returned to max level of function  Performance in recreational activities is improved to max level of function        Plan  Frequency: 2x week  Duration in weeks: 8        Subjective Evaluation    History of Present Illness  Mechanism of injury: Pt reports that she fell several weeks ago after tripping over her cat and landed on her L shoulder  Reports that she had and x-ray that revealed OA  Was referred to orthopaedics  Reports that she has pain when reaching behind her head and behind her back  Pain when reaching into her top cabinets  Reports that pain keep her up at night  Denies radicular symptoms  Pt is R handed  Reports that she takes Advil prn with min pain reduction  History or B/L hip TKA      Pain  Current pain ratin  At best pain ratin  At worst pain ratin    Patient Goals  Patient goals for therapy: decreased pain, increased motion, increased strength and independence with ADLs/IADLs          Objective       L shoulder:   AROM:   Flexion: 85   Abduction: 72  Shoulder shrug and pain reported with active shoulder elevation           PROM:   Flexion: 126   Abduction: 132   ER:  40, pain   IR: 30 pain    Strength:    Flexion:  /5   Abduction:   /5   ER:    /5   IR:     /5      Tender to palpation: bicipital groove and mid-deltoid        [place]ation: pos  Neer: neg  Empty can: neg  Painful arc: pos  Scour: neg  Biceps load:neg    Precautions: OA, previous L TRIP      Daily Treatment Diary       Manuals 10/31            prom 5'            jnt mobs 5'                                      Exercise Diary              ube             pulleys             Low rows             Tb ir/er             scap retraction with cane extension             Table slides             Supine cane ER                                                                                                                                                                                                                Modalities

## 2018-11-07 ENCOUNTER — OFFICE VISIT (OUTPATIENT)
Dept: PHYSICAL THERAPY | Facility: CLINIC | Age: 63
End: 2018-11-07
Payer: COMMERCIAL

## 2018-11-07 DIAGNOSIS — M75.22 BICEPS TENDINITIS OF LEFT UPPER EXTREMITY: Primary | ICD-10-CM

## 2018-11-07 DIAGNOSIS — S46.112A LABRAL TEAR OF LONG HEAD OF LEFT BICEPS TENDON, INITIAL ENCOUNTER: ICD-10-CM

## 2018-11-07 PROCEDURE — 97110 THERAPEUTIC EXERCISES: CPT

## 2018-11-07 PROCEDURE — 97140 MANUAL THERAPY 1/> REGIONS: CPT

## 2018-11-07 NOTE — PROGRESS NOTES
Daily Note     Today's date: 2018  Patient name: Su Tom  : 1955  MRN: [id number]  Referring provider: Nimo Pena MD  Dx:   Encounter Diagnosis     ICD-10-CM    1  Biceps tendinitis of left upper extremity M75 22    2  Labral tear of long head of left biceps tendon, initial encounter S46 112A                   Subjective: Pt reports her L shoulder pain level is 5/10  Objective: See treatment diary below      Assessment: Initiated exercise program as below, performed same w/o increasing symptoms  KL, PT performed L shoulder jt mobs  PROM to same, limited by pain and mm guarding, especially flex and ABD  Concluded with CP x 10'  Relief after tx  Will monitor  Patient would benefit from continued PT      Plan: Continue per plan of care         Precautions: OA, previous L TRIP        Daily Treatment Diary         Manuals 10/31  11/7                   prom 5'  10'                   jnt mobs 5'  5'                                                                   Exercise Diary                        ube    2'/2'                   pulleys    5"x20                   Low rows    YTB  x10                   Tb ir/er    YTB  x10ea                   scap retraction with cane extension   x10                   Table slides                       Supine cane ER                                                                                                                                                                                                                                                                                                                                                                                               Modalities

## 2018-11-08 ENCOUNTER — TELEPHONE (OUTPATIENT)
Dept: FAMILY MEDICINE CLINIC | Facility: CLINIC | Age: 63
End: 2018-11-08

## 2018-11-08 NOTE — TELEPHONE ENCOUNTER
Patient would like to know if she can join the [facility], unable to get more info patient only wants to know if you ok with this

## 2018-11-14 ENCOUNTER — OFFICE VISIT (OUTPATIENT)
Dept: PHYSICAL THERAPY | Facility: CLINIC | Age: 63
End: 2018-11-14
Payer: COMMERCIAL

## 2018-11-14 DIAGNOSIS — M75.22 BICEPS TENDINITIS OF LEFT UPPER EXTREMITY: Primary | ICD-10-CM

## 2018-11-14 PROCEDURE — 97140 MANUAL THERAPY 1/> REGIONS: CPT

## 2018-11-14 PROCEDURE — 97110 THERAPEUTIC EXERCISES: CPT

## 2018-11-14 NOTE — PROGRESS NOTES
Daily Note     Today's date: 2018  Patient name: Myles Ramsey  : 1955  MRN: [id number]  Referring provider: Jen Bowles MD  Dx:   Encounter Diagnosis     ICD-10-CM    1  Biceps tendinitis of left upper extremity M75 22                 Subjective: Patient reports the left shoulder is feeling "good" this afternoon - reports it's been feeling better overall - complains of a "pinching" type pain however "it's only once in a while "  Patient reports she has been taking Advil as needed for pain relief  Objective: See treatment diary below  Precautions: OA, previous L TRIP     Daily Treatment Diary      Manuals 10/31  11/7  11/14                 prom 5'  10'  8'                 jnt mobs 5'  5'  4'                                                                 Exercise Diary                        ube    2'/2'  3'/3'                 pulleys    5"x20  5"x20                 Low rows    YTB  x10  red  2x10                 Tb ir/er    YTB  x10ea  red ir  yellow er  2x10                 scap retraction with cane extension   x10  5"x20                 Table slides                       Supine cane ER                                                                                                                                                                                                                                                                                                                                                                                               Modalities                        CP post tx     10 min                                            Assessment: Fatigue reported throughout therapeutic exercise program   Must be gentle with left shoulder PROM due to pain  Plan: Continue treatment as per PT plan of care

## 2018-11-20 ENCOUNTER — OFFICE VISIT (OUTPATIENT)
Dept: PHYSICAL THERAPY | Facility: CLINIC | Age: 63
End: 2018-11-20
Payer: COMMERCIAL

## 2018-11-20 DIAGNOSIS — M75.22 BICEPS TENDINITIS OF LEFT UPPER EXTREMITY: Primary | ICD-10-CM

## 2018-11-20 DIAGNOSIS — M25.512 ACUTE PAIN OF LEFT SHOULDER: ICD-10-CM

## 2018-11-20 PROCEDURE — 97110 THERAPEUTIC EXERCISES: CPT

## 2018-11-20 PROCEDURE — 97140 MANUAL THERAPY 1/> REGIONS: CPT

## 2018-11-20 NOTE — PROGRESS NOTES
Daily Note     Today's date: 2018  Patient name: Mor Em  : 1955  MRN: [id number]  Referring provider: Rubén Aldridge MD  Dx:   Encounter Diagnosis     ICD-10-CM    1  Biceps tendinitis of left upper extremity M75 22    2  Acute pain of left shoulder M25 512                 Subjective: Patient reports the left shoulder has been feeling better overall  Objective: See treatment diary below  Precautions: OA, previous L TRIP     Daily Treatment Diary      Manuals 10/31  11/7  11/14  11/20               prom 5'  10'  8'  8'               jnt mobs 5'  5'  4'  4'                                                               Exercise Diary                        ube    2'/2'  3'/3'  3'/3'               pulleys    5"x20  5"x20  5"x20               Low rows    YTB  x10  red  2x10  red  2x10               Tb ir/er    YTB  x10ea  red ir  yellow er  2x10   red ir  yellow er  2x10               scap retraction with cane extension   x10  5"x20  5"x20               Table slides                       Supine cane ER                                                                                                                                                                                                                                                                                                                                                                                               Modalities                        CP post tx     10 min  NP                                          Assessment: Left upper extremity fatigue reported when performing theraband shoulder external rotation - verbal cues required to perform with correct technique  PT notes limitations in left shoulder internal and external rotation ROM during the manual stretching  Plan: Hold PT for one week then follow up

## 2018-11-27 ENCOUNTER — OFFICE VISIT (OUTPATIENT)
Dept: PHYSICAL THERAPY | Facility: CLINIC | Age: 63
End: 2018-11-27
Payer: COMMERCIAL

## 2018-11-27 DIAGNOSIS — M25.512 ACUTE PAIN OF LEFT SHOULDER: ICD-10-CM

## 2018-11-27 DIAGNOSIS — M75.22 BICEPS TENDINITIS OF LEFT UPPER EXTREMITY: Primary | ICD-10-CM

## 2018-11-27 PROCEDURE — G8992 OTHER PT/OT  D/C STATUS: HCPCS

## 2018-11-27 PROCEDURE — G8991 OTHER PT/OT GOAL STATUS: HCPCS

## 2018-11-27 PROCEDURE — 97110 THERAPEUTIC EXERCISES: CPT

## 2018-11-27 PROCEDURE — 97140 MANUAL THERAPY 1/> REGIONS: CPT

## 2018-11-27 NOTE — PROGRESS NOTES
DC Summary/Daily Note     Today's date: 2018  Patient name: Griselda Calixto  : 1955  MRN: [id number]  Referring provider: Darline Mcduffie MD  Dx:   Encounter Diagnosis     ICD-10-CM    1  Biceps tendinitis of left upper extremity M75 22    2  Acute pain of left shoulder M25 512                 Subjective: Patient reports the left shoulder continues to feel better overall  Objective: See treatment diary below  Precautions: OA, previous L TRIP     Daily Treatment Diary      Manuals 10/31  11/7  11/14  11/20  11/27             prom 5'  10'  8'  8'  8'             jnt mobs 5'  5'  4'  4'  NP                                                             Exercise Diary                        ube    2'/2'  3'/3'  3'/3'  3'/3'             pulleys    5"x20  5"x20  5"x20  5"x20             Low rows    YTB  x10  red  2x10  red  2x10  red   2x10              Tb ir/er    YTB  x10ea  red ir  yellow er  2x10   red ir  yellow er  2x10  red ir  yellow er  2x10             scap retraction with cane extension   x10  5"x20  5"x20  5"x20             Table slides                       Supine cane ER                                                                                                                                                                                                                                                                                                                                                                                               Modalities                        CP post tx     10 min  NP  NP                                        Assessment: Therapeutic exercise program is tolerated well  No significant pain reported throughout left shoulder PROM      Plan: Discharge patient from PT

## 2019-03-20 ENCOUNTER — OFFICE VISIT (OUTPATIENT)
Dept: FAMILY MEDICINE CLINIC | Facility: CLINIC | Age: 64
End: 2019-03-20
Payer: COMMERCIAL

## 2019-03-20 VITALS
HEIGHT: 61 IN | HEART RATE: 69 BPM | RESPIRATION RATE: 15 BRPM | WEIGHT: 195.7 LBS | DIASTOLIC BLOOD PRESSURE: 70 MMHG | BODY MASS INDEX: 36.95 KG/M2 | SYSTOLIC BLOOD PRESSURE: 110 MMHG | TEMPERATURE: 98.3 F

## 2019-03-20 DIAGNOSIS — Z13.220 SCREENING, LIPID: ICD-10-CM

## 2019-03-20 DIAGNOSIS — Z13.1 SCREENING FOR DIABETES MELLITUS: ICD-10-CM

## 2019-03-20 DIAGNOSIS — M25.552 PAIN OF BOTH HIP JOINTS: ICD-10-CM

## 2019-03-20 DIAGNOSIS — Z13.820 SCREENING FOR OSTEOPOROSIS: Primary | ICD-10-CM

## 2019-03-20 DIAGNOSIS — M25.551 PAIN OF BOTH HIP JOINTS: ICD-10-CM

## 2019-03-20 DIAGNOSIS — R06.02 SHORT OF BREATH ON EXERTION: ICD-10-CM

## 2019-03-20 PROCEDURE — 99214 OFFICE O/P EST MOD 30 MIN: CPT | Performed by: PHYSICIAN ASSISTANT

## 2019-03-20 PROCEDURE — 93000 ELECTROCARDIOGRAM COMPLETE: CPT | Performed by: PHYSICIAN ASSISTANT

## 2019-03-20 PROCEDURE — 3008F BODY MASS INDEX DOCD: CPT | Performed by: PHYSICIAN ASSISTANT

## 2019-03-20 NOTE — PROGRESS NOTES
Assessment and Plan:    1  AWV - conducted, refusing all vaccines, refusing PAP, looking into doing a living will    F/u 1 year  Problem List Items Addressed This Visit     None      Visit Diagnoses     Screening for osteoporosis    -  Primary    Relevant Orders    DXA bone density spine hip and pelvis        Health Maintenance Due   Topic Date Due    Hepatitis C Screening  1955   [facility] Medicare Annual Wellness Visit (AWV)  1955    DXA SCAN  1955    BMI: Followup Plan  08/08/1974    PAP SMEAR  08/09/2018    PT PLAN OF CARE  11/30/2018         HPI:  Danita Lizarraga is a 59 y o  female here for her Subsequent Wellness Visit      Patient Active Problem List   Diagnosis    Allergic rhinitis due to pollen    Arthritis    Obesity    Biceps tendinitis of left upper extremity    Labral tear of long head of left biceps tendon     Past Medical History:   Diagnosis Date    Blepharitis of eyelid of left eye      Past Surgical History:   Procedure Laterality Date    DENTAL SURGERY      [place] teeth extraction    HIP ARTHROPLASTY      Right    HIP ARTHROSCOPY      Left    TUBAL LIGATION       Family History   Problem Relation Age of Onset    Kidney cancer Mother     Lung cancer Father     Mental illness Cousin     Substance Abuse Neg Hx     Alcohol abuse Neg Hx      Social History     Tobacco Use   Smoking Status Never Smoker   Smokeless Tobacco Never Used     Social History     Substance and Sexual Activity   Alcohol Use No      Social History     Substance and Sexual Activity   Drug Use No       Current Outpatient Medications   Medication Sig Dispense Refill    ascorbic acid (VITAMIN C) 500 mg tablet Take 500 mg by mouth daily      Cholecalciferol (VITAMIN D-3) 1000 units CAPS Take by mouth daily      loratadine (CLARITIN) 10 mg tablet Take 1 tablet (10 mg total) by mouth daily 30 tablet 0    multivitamin (THERAGRAN) TABS Take 1 tablet by mouth daily      Omega-3 Fatty Acids (FISH OIL) 500 MG CAPS Take by mouth 3 (three) times a day       No current facility-administered medications for this visit  No Known Allergies  Immunization History   Administered Date(s) Administered    Influenza Quadrivalent, 6-35 Months IM 09/05/2017    Influenza, recombinant, quadrivalent,injectable, preservative free 10/01/2018    Tdap 08/09/2016    influenza, injectable, quadrivalent 10/24/2016       Patient Care Team:  Tyler De La Cruz MD as PCP - General  YENNY Fallon MD    Medicare Screening Tests and Risk Assessments:  Last Medicare Wellness visit information reviewed, patient interviewed, no change since last AWV  Health Risk Assessment:  Patient rates overall health as very good  Patient feels that their physical health rating is Same  Eyesight was rated as Slightly worse  Hearing was rated as Same  Patient feels that their emotional and mental health rating is Same  Pain experienced by patient in the last 7 days has been Some  Patient's pain rating has been 2/10  Patient states that she has experienced no weight loss or gain in last 6 months  Emotional/Mental Health:  Patient has been feeling nervous/anxious  PHQ-9 Depression Screening:    Frequency of the following problems over the past two weeks:      1  Little interest or pleasure in doing things: 0 - not at all      2  Feeling down, depressed, or hopeless: 0 - not at all  PHQ-2 Score: 0          Broken Bones/Falls: Fall Risk Assessment:    In the past year, patient has experienced: No history of falling in past year          Bladder/Bowel:  Patient has not leaked urine accidently in the last six months  Patient reports no loss of bowel control  Immunizations:  Patient has had a flu vaccination within the last year  Patient has received a pneumonia shot  Patient has not received a shingles shot  Patient has not received tetanus/diphtheria shot       Home Safety:  Patient has trouble with stairs inside or outside of their home  Patient currently reports that there are no safety hazards present in home, working smoke alarms, working carbon monoxide detectors  Preventative Screenings:   Breast cancer screening performed, colon cancer screen completed, cholesterol screen completed, glaucoma eye exam completed,     Nutrition:  Current diet: Regular and Limited junk food with servings of the following:    Medications:  Patient is not currently taking any over-the-counter supplements  Patient is able to manage medications  Lifestyle Choices:  Patient reports no tobacco use  Patient has not smoked or used tobacco in the past   Patient reports no alcohol use  Patient drives a vehicle  Patient wears seat belt  Current level of exercise of physical activity described by patient as: walking   Activities of Daily Living:  Can get out of bed by his or her self, able to dress self, able to make own meals, able to do own shopping, able to bathe self, can do own laundry/housekeeping, can manage own money, pay bills and track expenses    Previous Hospitalizations:  No hospitalization or ED visit in past 12 months        Advanced Directives:  Patient has decided on a power of   Patient has spoken to designated power of   Patient has not completed advanced directive    Additional Comments: Working on it     Preventative Screening/Counseling:      Cardiovascular:      General: Risks and Benefits Discussed and Screening Not Indicated      Counseling: Healthy Diet and Healthy Weight          Diabetes:      General: Risks and Benefits Discussed and Screening Current      Counseling: Healthy Diet, Healthy Weight and Improve Physical Activity          Colorectal Cancer:      General: Risks and Benefits Discussed and Screening Current      Counseling: high fiber diet          Breast Cancer:      General: Risks and Benefits Discussed and Screening Current          Cervical Cancer:      General: Risks and Benefits Discussed and Patient Declines          Osteoporosis:      General: Risks and Benefits Discussed      Counseling: Calcium and Vitamin D Intake and Regular Weightbearing Exercise      Due for studies: DXA Axial          AAA:      General: Screening Not Indicated          Glaucoma:      General: Risks and Benefits Discussed and Screening Current          HIV:      General: Screening Not Indicated          Hepatitis C:      General: Patient Declines        Advanced Directives:   Patient has no living will for healthcare, does not have durable POA for healthcare, patient does not have an advanced directive  Information on ACP and/or AD provided  End of life assessment reviewed with patient  Provider agrees with end of life decisions   Immunizations:      Influenza: Risks & Benefits Discussed and Influenza UTD This Year      Pneumococcal: Risks & Benefits Discussed and Patient Declines      Shingrix: Risks & Benefits Discussed and Patient Declines      Hepatitis B (Low risk patients): Series Not Indicated and Prevention Counseling      Zostavax: Risks & Benefits Discussed and Patient Declines      TD: Risks & Benefits Discussed and Td Vaccine UTD      TDAP: Risks & Benefits Discussed and Tdap Vaccine UTD      Other Preventative Counseling (Non-Medicare):   Fall Prevention, Increase physical activity, Nutrition Counseling, Car/seat belt/driving safety reviewed, Skin self-exam, Sunscreen use, Dietary education for weight gain and Weight reduction discussed

## 2019-03-20 NOTE — PROGRESS NOTES
Assessment/Plan:    1  Pain of both hip joints    - recent XR show hip replacements stable, will refer to PT for strengthening  - Ambulatory referral to Physical Therapy; Future    2  Short of breath on exertion    - will r/o cardiac origin, EKG NSR, CXR and labs, if all normal will do stress test  Possibly just out of shape, but because new symptoms only 2 weeks will work up  - CBC and differential; Future  - Comprehensive metabolic panel; Future  - Lipid Panel with Direct LDL reflex; Future  - TSH, 3rd generation with Free T4 reflex; Future  - XR chest pa & lateral; Future  - POCT ECG    3  Screening for diabetes mellitus    - Comprehensive metabolic panel; Future    4  Screening, lipid    - Lipid Panel with Direct LDL reflex; Future    F/u as needed  F/u pending results      Subjective:   Chief Complaint   Patient presents with    Hip Pain      Patient ID: Myles Ramsey is a 59 y o  female  Patient here still complaining of hip pain  Had a replacement in 2013, 2014  Thinks it is time for replacement  They constantly ache, finds it hard to get up in morning  Had XR last year and showed replacements were normal      Past few weeks patient getting short of breath with 10-12 steps has to stop and rest  Denies chest pain, sweating, dizzy, light headed  Anytime she is in a hurry and has to exert herself  Cleans houses for a leaving  Every day by 4 pm has to take a nap and then will get up and get going but still gets light headed        The following portions of the patient's history were reviewed and updated as appropriate: allergies, current medications, past family history, past medical history, past social history, past surgical history and problem list     Past Medical History:   Diagnosis Date    Blepharitis of eyelid of left eye      Past Surgical History:   Procedure Laterality Date    DENTAL SURGERY      [place] teeth extraction    HIP ARTHROPLASTY      Right    HIP ARTHROSCOPY      Left    TUBAL LIGATION       Family History   Problem Relation Age of Onset    Kidney cancer Mother     Lung cancer Father     Mental illness Cousin     Substance Abuse Neg Hx     Alcohol abuse Neg Hx      Social History     Socioeconomic History    Marital status: Single     Spouse name: Not on file    Number of children: Not on file    Years of education: Not on file    Highest education level: Not on file   Occupational History    Not on file   Social Needs    Financial resource strain: Not on file    Food insecurity:     Worry: Not on file     Inability: Not on file    Transportation needs:     Medical: Not on file     Non-medical: Not on file   Tobacco Use    Smoking status: Never Smoker    Smokeless tobacco: Never Used   Substance and Sexual Activity    Alcohol use: No    Drug use: No    Sexual activity: Not on file   Lifestyle    Physical activity:     Days per week: Not on file     Minutes per session: Not on file    Stress: Not on file   Relationships    Social connections:     Talks on phone: Not on file     Gets together: Not on file     Attends Worship service: Not on file     Active member of club or organization: Not on file     Attends meetings of clubs or organizations: Not on file     Relationship status: Not on file    Intimate partner violence:     Fear of current or ex partner: Not on file     Emotionally abused: Not on file     Physically abused: Not on file     Forced sexual activity: Not on file   Other Topics Concern    Not on file   Social History Narrative    Always uses a seatbelt    Caffeine use- 1 cup of coffee per day    Has smoke detectors       Current Outpatient Medications:     ascorbic acid (VITAMIN C) 500 mg tablet, Take 500 mg by mouth daily, Disp: , Rfl:     Cholecalciferol (VITAMIN D-3) 1000 units CAPS, Take by mouth daily, Disp: , Rfl:     loratadine (CLARITIN) 10 mg tablet, Take 1 tablet (10 mg total) by mouth daily, Disp: 30 tablet, Rfl: 0    multivitamin (THERAGRAN) TABS, Take 1 tablet by mouth daily, Disp: , Rfl:     Omega-3 Fatty Acids (FISH OIL) 500 MG CAPS, Take by mouth 3 (three) times a day, Disp: , Rfl:     Review of Systems   Constitutional: Negative  Eyes: Negative  Respiratory: Positive for shortness of breath  Negative for cough and chest tightness  Cardiovascular: Negative  Neurological: Negative  Objective:    Vitals:    03/20/19 1316   BP: 110/70   BP Location: Right arm   Patient Position: Sitting   Cuff Size: Standard   Pulse: 69   Resp: 15   Temp: 98 3 °F (36 8 °C)   TempSrc: Oral   Weight: 88 8 kg (195 lb 11 2 oz)   Height: 5' 1" (1 549 m)        Physical Exam   Constitutional: She is oriented to person, place, and time  She appears well-developed and well-nourished  Cardiovascular: Normal rate, regular rhythm, normal heart sounds and intact distal pulses  No murmur heard  Pulmonary/Chest: Effort normal and breath sounds normal    Musculoskeletal: Normal range of motion  She exhibits no edema  Neurological: She is alert and oriented to person, place, and time  Skin: Skin is warm  Psychiatric: She has a normal mood and affect         XR b/l hips 7/2018 stable hip replacements

## 2019-04-02 ENCOUNTER — APPOINTMENT (OUTPATIENT)
Dept: PHYSICAL THERAPY | Facility: CLINIC | Age: 64
End: 2019-04-02
Payer: COMMERCIAL

## 2019-04-03 ENCOUNTER — EVALUATION (OUTPATIENT)
Dept: PHYSICAL THERAPY | Facility: CLINIC | Age: 64
End: 2019-04-03
Payer: COMMERCIAL

## 2019-04-03 DIAGNOSIS — M25.551 PAIN OF BOTH HIP JOINTS: Primary | ICD-10-CM

## 2019-04-03 DIAGNOSIS — M25.552 PAIN OF BOTH HIP JOINTS: Primary | ICD-10-CM

## 2019-04-03 PROCEDURE — 97161 PT EVAL LOW COMPLEX 20 MIN: CPT | Performed by: PHYSICAL THERAPIST

## 2019-04-09 ENCOUNTER — OFFICE VISIT (OUTPATIENT)
Dept: PHYSICAL THERAPY | Facility: CLINIC | Age: 64
End: 2019-04-09
Payer: COMMERCIAL

## 2019-04-09 DIAGNOSIS — M25.552 PAIN OF BOTH HIP JOINTS: Primary | ICD-10-CM

## 2019-04-09 DIAGNOSIS — M25.551 PAIN OF BOTH HIP JOINTS: Primary | ICD-10-CM

## 2019-04-09 PROCEDURE — 97140 MANUAL THERAPY 1/> REGIONS: CPT

## 2019-04-09 PROCEDURE — 97110 THERAPEUTIC EXERCISES: CPT

## 2019-04-11 ENCOUNTER — OFFICE VISIT (OUTPATIENT)
Dept: PHYSICAL THERAPY | Facility: CLINIC | Age: 64
End: 2019-04-11
Payer: COMMERCIAL

## 2019-04-11 DIAGNOSIS — M25.551 PAIN OF BOTH HIP JOINTS: Primary | ICD-10-CM

## 2019-04-11 DIAGNOSIS — M25.552 PAIN OF BOTH HIP JOINTS: Primary | ICD-10-CM

## 2019-04-11 PROCEDURE — 97530 THERAPEUTIC ACTIVITIES: CPT

## 2019-04-11 PROCEDURE — 97140 MANUAL THERAPY 1/> REGIONS: CPT

## 2019-04-11 PROCEDURE — 97110 THERAPEUTIC EXERCISES: CPT

## 2019-04-15 ENCOUNTER — OFFICE VISIT (OUTPATIENT)
Dept: PHYSICAL THERAPY | Facility: CLINIC | Age: 64
End: 2019-04-15
Payer: COMMERCIAL

## 2019-04-15 DIAGNOSIS — M25.552 PAIN OF BOTH HIP JOINTS: Primary | ICD-10-CM

## 2019-04-15 DIAGNOSIS — M25.551 PAIN OF BOTH HIP JOINTS: Primary | ICD-10-CM

## 2019-04-15 PROCEDURE — 97110 THERAPEUTIC EXERCISES: CPT

## 2019-04-15 PROCEDURE — 97140 MANUAL THERAPY 1/> REGIONS: CPT

## 2019-04-17 ENCOUNTER — TELEPHONE (OUTPATIENT)
Dept: FAMILY MEDICINE CLINIC | Facility: CLINIC | Age: 64
End: 2019-04-17

## 2019-04-17 ENCOUNTER — OFFICE VISIT (OUTPATIENT)
Dept: PHYSICAL THERAPY | Facility: CLINIC | Age: 64
End: 2019-04-17
Payer: COMMERCIAL

## 2019-04-17 DIAGNOSIS — M25.551 PAIN OF BOTH HIP JOINTS: Primary | ICD-10-CM

## 2019-04-17 DIAGNOSIS — M25.552 PAIN OF BOTH HIP JOINTS: Primary | ICD-10-CM

## 2019-04-17 PROCEDURE — 97110 THERAPEUTIC EXERCISES: CPT

## 2019-04-17 PROCEDURE — 97140 MANUAL THERAPY 1/> REGIONS: CPT

## 2019-04-23 ENCOUNTER — OFFICE VISIT (OUTPATIENT)
Dept: PHYSICAL THERAPY | Facility: CLINIC | Age: 64
End: 2019-04-23
Payer: COMMERCIAL

## 2019-04-23 DIAGNOSIS — M25.551 PAIN OF BOTH HIP JOINTS: Primary | ICD-10-CM

## 2019-04-23 DIAGNOSIS — M25.552 PAIN OF BOTH HIP JOINTS: Primary | ICD-10-CM

## 2019-04-23 PROCEDURE — 97140 MANUAL THERAPY 1/> REGIONS: CPT

## 2019-04-23 PROCEDURE — 97110 THERAPEUTIC EXERCISES: CPT

## 2019-04-25 ENCOUNTER — OFFICE VISIT (OUTPATIENT)
Dept: PHYSICAL THERAPY | Facility: CLINIC | Age: 64
End: 2019-04-25
Payer: COMMERCIAL

## 2019-04-25 DIAGNOSIS — M25.551 PAIN OF BOTH HIP JOINTS: Primary | ICD-10-CM

## 2019-04-25 DIAGNOSIS — M25.552 PAIN OF BOTH HIP JOINTS: Primary | ICD-10-CM

## 2019-04-25 PROCEDURE — 97110 THERAPEUTIC EXERCISES: CPT | Performed by: PHYSICAL THERAPIST

## 2019-04-25 PROCEDURE — 97530 THERAPEUTIC ACTIVITIES: CPT | Performed by: PHYSICAL THERAPIST

## 2019-04-30 ENCOUNTER — APPOINTMENT (OUTPATIENT)
Dept: PHYSICAL THERAPY | Facility: CLINIC | Age: 64
End: 2019-04-30
Payer: COMMERCIAL

## 2019-05-01 ENCOUNTER — OFFICE VISIT (OUTPATIENT)
Dept: PHYSICAL THERAPY | Facility: CLINIC | Age: 64
End: 2019-05-01
Payer: COMMERCIAL

## 2019-05-01 DIAGNOSIS — M25.552 PAIN OF BOTH HIP JOINTS: Primary | ICD-10-CM

## 2019-05-01 DIAGNOSIS — M25.551 PAIN OF BOTH HIP JOINTS: Primary | ICD-10-CM

## 2019-05-01 PROCEDURE — 97530 THERAPEUTIC ACTIVITIES: CPT

## 2019-05-01 PROCEDURE — 97110 THERAPEUTIC EXERCISES: CPT

## 2019-05-01 PROCEDURE — 97140 MANUAL THERAPY 1/> REGIONS: CPT

## 2019-05-02 ENCOUNTER — OFFICE VISIT (OUTPATIENT)
Dept: PHYSICAL THERAPY | Facility: CLINIC | Age: 64
End: 2019-05-02
Payer: COMMERCIAL

## 2019-05-02 DIAGNOSIS — M25.551 PAIN OF BOTH HIP JOINTS: Primary | ICD-10-CM

## 2019-05-02 DIAGNOSIS — M25.552 PAIN OF BOTH HIP JOINTS: Primary | ICD-10-CM

## 2019-05-02 PROCEDURE — 97110 THERAPEUTIC EXERCISES: CPT

## 2019-05-02 PROCEDURE — 97140 MANUAL THERAPY 1/> REGIONS: CPT

## 2019-05-07 ENCOUNTER — OFFICE VISIT (OUTPATIENT)
Dept: PHYSICAL THERAPY | Facility: CLINIC | Age: 64
End: 2019-05-07
Payer: COMMERCIAL

## 2019-05-07 DIAGNOSIS — M25.552 PAIN OF BOTH HIP JOINTS: Primary | ICD-10-CM

## 2019-05-07 DIAGNOSIS — M25.551 PAIN OF BOTH HIP JOINTS: Primary | ICD-10-CM

## 2019-05-07 PROCEDURE — 97530 THERAPEUTIC ACTIVITIES: CPT | Performed by: PHYSICAL THERAPIST

## 2019-05-07 PROCEDURE — 97110 THERAPEUTIC EXERCISES: CPT | Performed by: PHYSICAL THERAPIST

## 2019-05-09 ENCOUNTER — OFFICE VISIT (OUTPATIENT)
Dept: PHYSICAL THERAPY | Facility: CLINIC | Age: 64
End: 2019-05-09
Payer: COMMERCIAL

## 2019-05-09 DIAGNOSIS — M25.551 PAIN OF BOTH HIP JOINTS: Primary | ICD-10-CM

## 2019-05-09 DIAGNOSIS — M25.552 PAIN OF BOTH HIP JOINTS: Primary | ICD-10-CM

## 2019-05-09 PROCEDURE — 97140 MANUAL THERAPY 1/> REGIONS: CPT

## 2019-05-09 PROCEDURE — 97110 THERAPEUTIC EXERCISES: CPT

## 2019-05-09 PROCEDURE — 97530 THERAPEUTIC ACTIVITIES: CPT

## 2019-07-18 DIAGNOSIS — Z12.39 SCREENING FOR MALIGNANT NEOPLASM OF BREAST: Primary | ICD-10-CM

## 2019-09-30 ENCOUNTER — HOSPITAL ENCOUNTER (OUTPATIENT)
Dept: MAMMOGRAPHY | Facility: CLINIC | Age: 64
Discharge: HOME | End: 2019-09-30
Payer: COMMERCIAL

## 2019-09-30 ENCOUNTER — IMMUNIZATIONS (OUTPATIENT)
Dept: FAMILY MEDICINE CLINIC | Facility: CLINIC | Age: 64
End: 2019-09-30
Payer: COMMERCIAL

## 2019-09-30 VITALS — BODY MASS INDEX: 36.82 KG/M2 | HEIGHT: 61 IN | WEIGHT: 195 LBS

## 2019-09-30 DIAGNOSIS — Z23 ENCOUNTER FOR IMMUNIZATION: ICD-10-CM

## 2019-09-30 DIAGNOSIS — Z12.39 SCREENING FOR MALIGNANT NEOPLASM OF BREAST: ICD-10-CM

## 2019-09-30 PROCEDURE — G0008 ADMIN INFLUENZA VIRUS VAC: HCPCS

## 2019-09-30 PROCEDURE — 77067 SCR MAMMO BI INCL CAD: CPT

## 2019-09-30 PROCEDURE — 77063 BREAST TOMOSYNTHESIS BI: CPT

## 2019-09-30 PROCEDURE — 90682 RIV4 VACC RECOMBINANT DNA IM: CPT

## 2019-10-02 ENCOUNTER — TELEPHONE (OUTPATIENT)
Dept: FAMILY MEDICINE CLINIC | Facility: CLINIC | Age: 64
End: 2019-10-02

## 2019-10-02 NOTE — TELEPHONE ENCOUNTER
Patient lives on the second floor of the [facility] on [address]  She states she gets winded doing the stairs and she asked the  to move her but they told patient she needs a note from her doctor for this  Are you willing to do the note?

## 2019-10-02 NOTE — TELEPHONE ENCOUNTER
Pt  Called asking if she can have a note from you to change her apartment down to the 2nd floor due to her having trouble climbing the steps to get to her apartment

## 2019-10-30 ENCOUNTER — OFFICE VISIT (OUTPATIENT)
Dept: FAMILY MEDICINE CLINIC | Facility: CLINIC | Age: 64
End: 2019-10-30
Payer: COMMERCIAL

## 2019-10-30 VITALS
DIASTOLIC BLOOD PRESSURE: 76 MMHG | SYSTOLIC BLOOD PRESSURE: 120 MMHG | RESPIRATION RATE: 14 BRPM | WEIGHT: 187.8 LBS | HEIGHT: 61 IN | TEMPERATURE: 98.4 F | HEART RATE: 72 BPM | BODY MASS INDEX: 35.45 KG/M2

## 2019-10-30 DIAGNOSIS — M54.50 ACUTE BILATERAL LOW BACK PAIN, UNSPECIFIED WHETHER SCIATICA PRESENT: ICD-10-CM

## 2019-10-30 DIAGNOSIS — Z00.00 MEDICARE ANNUAL WELLNESS VISIT, SUBSEQUENT: Primary | ICD-10-CM

## 2019-10-30 DIAGNOSIS — S29.012A STRAIN OF THORACIC BACK REGION: ICD-10-CM

## 2019-10-30 PROCEDURE — 99213 OFFICE O/P EST LOW 20 MIN: CPT | Performed by: PHYSICIAN ASSISTANT

## 2019-10-30 PROCEDURE — G0439 PPPS, SUBSEQ VISIT: HCPCS | Performed by: PHYSICIAN ASSISTANT

## 2019-10-30 PROCEDURE — 3008F BODY MASS INDEX DOCD: CPT | Performed by: PHYSICIAN ASSISTANT

## 2019-10-30 RX ORDER — CYCLOBENZAPRINE HCL 10 MG
10 TABLET ORAL
Qty: 20 TABLET | Refills: 0 | Status: SHIPPED | OUTPATIENT
Start: 2019-10-30 | End: 2020-12-09 | Stop reason: ALTCHOICE

## 2019-10-30 NOTE — PATIENT INSTRUCTIONS
Medicare Preventive Visit Patient Instructions  Thank you for completing your Welcome to Medicare Visit or Medicare Annual Wellness Visit today  Your next wellness visit will be due in one year (10/30/2020)  The screening/preventive services that you may require over the next 5-10 years are detailed below  Some tests may not apply to you based off risk factors and/or age  Screening tests ordered at today's visit but not completed yet may show as past due  Also, please note that scanned in results may not display below  Preventive Screenings:  Service Recommendations Previous Testing/Comments   Colorectal Cancer Screening  * Colonoscopy    * Fecal Occult Blood Test (FOBT)/Fecal Immunochemical Test (FIT)  * Fecal DNA/Cologuard Test  * Flexible Sigmoidoscopy Age: 54-65 years old   Colonoscopy: every 10 years (may be performed more frequently if at higher risk)  OR  FOBT/FIT: every 1 year  OR  Cologuard: every 3 years  OR  Sigmoidoscopy: every 5 years  Screening may be recommended earlier than age 48 if at higher risk for colorectal cancer  Also, an individualized decision between you and your healthcare provider will decide whether screening between the ages of 74-80 would be appropriate  Colonoscopy: 09/29/2015  FOBT/FIT: Not on file  Cologuard: Not on file  Sigmoidoscopy: Not on file    Screening Current     Breast Cancer Screening Age: 36 years old  Frequency: every 1-2 years  Not required if history of left and right mastectomy Mammogram: 09/30/2019    Screening Current   Cervical Cancer Screening Between the ages of 21-29, pap smear recommended once every 3 years  Between the ages of 33-67, can perform pap smear with HPV co-testing every 5 years     Recommendations may differ for women with a history of total hysterectomy, cervical cancer, or abnormal pap smears in past  Pap Smear: 08/09/2016       Hepatitis C Screening Once for adults born between 1945 and 1965  More frequently in patients at high risk for Hepatitis C Hep C Antibody: 01/06/2012    Screening Current   Diabetes Screening 1-2 times per year if you're at risk for diabetes or have pre-diabetes Fasting glucose: No results in last 5 years   A1C: No results in last 5 years       Cholesterol Screening Once every 5 years if you don't have a lipid disorder  May order more often based on risk factors  Lipid panel: Not on file         Other Preventive Screenings Covered by Medicare:  1  Abdominal Aortic Aneurysm (AAA) Screening: covered once if your at risk  You're considered to be at risk if you have a family history of AAA  2  Lung Cancer Screening: covers low dose CT scan once per year if you meet all of the following conditions: (1) Age 50-69; (2) No signs or symptoms of lung cancer; (3) Current smoker or have quit smoking within the last 15 years; (4) You have a tobacco smoking history of at least 30 pack years (packs per day multiplied by number of years you smoked); (5) You get a written order from a healthcare provider  3  Glaucoma Screening: covered annually if you're considered high risk: (1) You have diabetes OR (2) Family history of glaucoma OR (3)  aged 48 and older OR (3)  American aged 72 and older  3  Osteoporosis Screening: covered every 2 years if you meet one of the following conditions: (1) You're estrogen deficient and at risk for osteoporosis based off medical history and other findings; (2) Have a vertebral abnormality; (3) On glucocorticoid therapy for more than 3 months; (4) Have primary hyperparathyroidism; (5) On osteoporosis medications and need to assess response to drug therapy  · Last bone density test (DXA Scan): Not on file  5  HIV Screening: covered annually if you're between the age of 12-76  Also covered annually if you are younger than 13 and older than 72 with risk factors for HIV infection  For pregnant patients, it is covered up to 3 times per pregnancy      Immunizations:  Immunization Recommendations   Influenza Vaccine Annual influenza vaccination during flu season is recommended for all persons aged >= 6 months who do not have contraindications   Pneumococcal Vaccine (Prevnar and Pneumovax)  * Prevnar = PCV13  * Pneumovax = PPSV23   Adults 25-60 years old: 1-3 doses may be recommended based on certain risk factors  Adults 72 years old: Prevnar (PCV13) vaccine recommended followed by Pneumovax (PPSV23) vaccine  If already received PPSV23 since turning 65, then PCV13 recommended at least one year after PPSV23 dose  Hepatitis B Vaccine 3 dose series if at intermediate or high risk (ex: diabetes, end stage renal disease, liver disease)   Tetanus (Td) Vaccine - COST NOT COVERED BY MEDICARE PART B Following completion of primary series, a booster dose should be given every 10 years to maintain immunity against tetanus  Td may also be given as tetanus wound prophylaxis  Tdap Vaccine - COST NOT COVERED BY MEDICARE PART B Recommended at least once for all adults  For pregnant patients, recommended with each pregnancy  Shingles Vaccine (Shingrix) - COST NOT COVERED BY MEDICARE PART B  2 shot series recommended in those aged 48 and above     Health Maintenance Due:      Topic Date Due    DXA SCAN  1955    Cervical Cancer Screening  08/09/2018    MAMMOGRAM  09/30/2020    CRC Screening: Colonoscopy  09/29/2025    Hepatitis C Screening  Completed     Immunizations Due:  There are no preventive care reminders to display for this patient  Advance Directives   What are advance directives? Advance directives are legal documents that state your wishes and plans for medical care  These plans are made ahead of time in case you lose your ability to make decisions for yourself  Advance directives can apply to any medical decision, such as the treatments you want, and if you want to donate organs  What are the types of advance directives?   There are many types of advance directives, and each state has rules about how to use them  You may choose a combination of any of the following:  · Living will: This is a written record of the treatment you want  You can also choose which treatments you do not want, which to limit, and which to stop at a certain time  This includes surgery, medicine, IV fluid, and tube feedings  · Durable power of  for healthcare [facility]): This is a written record that states who you want to make healthcare choices for you when you are unable to make them for yourself  This person, called a proxy, is usually a family member or a friend  You may choose more than 1 proxy  · Do not resuscitate (DNR) order:  A DNR order is used in case your heart stops beating or you stop breathing  It is a request not to have certain forms of treatment, such as CPR  A DNR order may be included in other types of advance directives  · Medical directive: This covers the care that you want if you are in a coma, near death, or unable to make decisions for yourself  You can list the treatments you want for each condition  Treatment may include pain medicine, surgery, blood transfusions, dialysis, IV or tube feedings, and a ventilator (breathing machine)  · Values history: This document has questions about your views, beliefs, and how you feel and think about life  This information can help others choose the care that you would choose  Why are advance directives important? An advance directive helps you control your care  Although spoken wishes may be used, it is better to have your wishes written down  Spoken wishes can be misunderstood, or not followed  Treatments may be given even if you do not want them  An advance directive may make it easier for your family to make difficult choices about your care     Weight Management   Why it is important to manage your weight:  Being overweight increases your risk of health conditions such as heart disease, high blood pressure, type 2 diabetes, and certain types of cancer  It can also increase your risk for osteoarthritis, sleep apnea, and other respiratory problems  Aim for a slow, steady weight loss  Even a small amount of weight loss can lower your risk of health problems  How to lose weight safely:  A safe and healthy way to lose weight is to eat fewer calories and get regular exercise  You can lose up about 1 pound a week by decreasing the number of calories you eat by 500 calories each day  Healthy meal plan for weight management:  A healthy meal plan includes a variety of foods, contains fewer calories, and helps you stay healthy  A healthy meal plan includes the following:  · Eat whole-grain foods more often  A healthy meal plan should contain fiber  Fiber is the part of grains, fruits, and vegetables that is not broken down by your body  Whole-grain foods are healthy and provide extra fiber in your diet  Some examples of whole-grain foods are whole-wheat breads and pastas, oatmeal, brown rice, and bulgur  · Eat a variety of vegetables every day  Include dark, leafy greens such as spinach, kale, mata greens, and mustard greens  Eat yellow and orange vegetables such as carrots, sweet potatoes, and winter squash  · Eat a variety of fruits every day  Choose fresh or canned fruit (canned in its own juice or light syrup) instead of juice  Fruit juice has very little or no fiber  · Eat low-fat dairy foods  Drink fat-free (skim) milk or 1% milk  Eat fat-free yogurt and low-fat cottage cheese  Try low-fat cheeses such as mozzarella and other reduced-fat cheeses  · Choose meat and other protein foods that are low in fat  Choose beans or other legumes such as split peas or lentils  Choose fish, skinless poultry (chicken or turkey), or lean cuts of red meat (beef or pork)  Before you cook meat or poultry, cut off any visible fat  · Use less fat and oil  Try baking foods instead of frying them   Add less fat, such as margarine, sour cream, regular salad dressing and mayonnaise to foods  Eat fewer high-fat foods  Some examples of high-fat foods include french fries, doughnuts, ice cream, and cakes  · Eat fewer sweets  Limit foods and drinks that are high in sugar  This includes candy, cookies, regular soda, and sweetened drinks  Exercise:  Exercise at least 30 minutes per day on most days of the week  Some examples of exercise include walking, biking, dancing, and swimming  You can also fit in more physical activity by taking the stairs instead of the elevator or parking farther away from stores  Ask your healthcare provider about the best exercise plan for you  © Copyright [employer] 2018 Information is for End User's use only and may not be sold, redistributed or otherwise used for commercial purposes   All illustrations and images included in CareNotes® are the copyrighted property of A D A M , Inc  or [address] [place] [address]

## 2019-10-30 NOTE — PROGRESS NOTES
Assessment/Plan:      1  Strain of thoracic back region    - heating pad to back, stretching, try flexeril at night, tylenol as needed, PT eval to strengthen  - cyclobenzaprine (FLEXERIL) 10 mg tablet; Take 1 tablet (10 mg total) by mouth daily at bedtime  Dispense: 20 tablet; Refill: 0  - Ambulatory referral to Physical Therapy; Future    2  BMI 35 0-35 9,adult    - encouraged patient to work on [employer], exercise  and adequate sleep for weight loss  Follow up if more help is needed    F/u as needed      Subjective:   Chief Complaint   Patient presents with    Back Pain    Hip Pain      Patient ID: Sammie Garay is a 62 y o  female  Patient at the gym on Saturday, back started hurt while doing exercise, constant pain low back started on Sunday  Pain rates about an 8:10, denies radiation  Exacerbating OA in knee  Tried Asprin with no relief  Tried Advil every 4 hours with no relief  Did not sleep well  Denies weakness or numbness in legs  B/l Hip replacement years ago        The following portions of the patient's history were reviewed and updated as appropriate: allergies, current medications, past family history, past medical history, past social history, past surgical history and problem list     Past Medical History:   Diagnosis Date    Blepharitis of eyelid of left eye      Past Surgical History:   Procedure Laterality Date    DENTAL SURGERY      [place] teeth extraction    HIP ARTHROPLASTY      Right    HIP ARTHROSCOPY      Left    TUBAL LIGATION       Family History   Problem Relation Age of Onset    Kidney cancer Mother     Lung cancer Father     No Known Problems Sister     Mental illness Cousin     No Known Problems Maternal Grandmother     No Known Problems Maternal Grandfather     No Known Problems Paternal Grandmother     No Known Problems Paternal Grandfather     Breast cancer Maternal Aunt     No Known Problems Maternal Aunt     No Known Problems Maternal Aunt     No Known Problems Maternal Aunt     No Known Problems Maternal Aunt     No Known Problems Maternal Aunt     No Known Problems Paternal Aunt     Substance Abuse Neg Hx     Alcohol abuse Neg Hx      Social History     Socioeconomic History    Marital status: Single     Spouse name: Not on file    Number of children: Not on file    Years of education: Not on file    Highest education level: Not on file   Occupational History    Not on file   Social Needs    Financial resource strain: Not on file    Food insecurity:     Worry: Not on file     Inability: Not on file    Transportation needs:     Medical: Not on file     Non-medical: Not on file   Tobacco Use    Smoking status: Never Smoker    Smokeless tobacco: Never Used   Substance and Sexual Activity    Alcohol use: No    Drug use: No    Sexual activity: Not on file   Lifestyle    Physical activity:     Days per week: Not on file     Minutes per session: Not on file    Stress: Not on file   Relationships    Social connections:     Talks on phone: Not on file     Gets together: Not on file     Attends Gnosticist service: Not on file     Active member of club or organization: Not on file     Attends meetings of clubs or organizations: Not on file     Relationship status: Not on file    Intimate partner violence:     Fear of current or ex partner: Not on file     Emotionally abused: Not on file     Physically abused: Not on file     Forced sexual activity: Not on file   Other Topics Concern    Not on file   Social History Narrative    Always uses a seatbelt    Caffeine use- 1 cup of coffee per day    Has smoke detectors       Current Outpatient Medications:     ascorbic acid (VITAMIN C) 500 mg tablet, Take 500 mg by mouth daily, Disp: , Rfl:     Cholecalciferol (VITAMIN D-3) 1000 units CAPS, Take by mouth daily, Disp: , Rfl:     loratadine (CLARITIN) 10 mg tablet, Take 1 tablet (10 mg total) by mouth daily, Disp: 30 tablet, Rfl: 0    multivitamin (THERAGRAN) TABS, Take 1 tablet by mouth daily, Disp: , Rfl:     Omega-3 Fatty Acids (FISH OIL) 500 MG CAPS, Take by mouth 3 (three) times a day, Disp: , Rfl:     Review of Systems          Objective:    Vitals:    10/30/19 1348   BP: 120/76   BP Location: Left arm   Patient Position: Sitting   Cuff Size: Standard   Pulse: 72   Resp: 14   Temp: 98 4 °F (36 9 °C)   TempSrc: Oral   Weight: 85 2 kg (187 lb 12 8 oz)   Height: 5' 1" (1 549 m)        Physical Exam   Constitutional: She is oriented to person, place, and time  She appears well-developed and well-nourished  Cardiovascular: Normal rate, regular rhythm and normal heart sounds  Pulmonary/Chest: Effort normal and breath sounds normal    Musculoskeletal:   Thoracic muscles with spasm, tender,  Full ROM, no weakness   Neurological: She is alert and oriented to person, place, and time  Psychiatric: She has a normal mood and affect   Judgment normal

## 2019-11-06 ENCOUNTER — EVALUATION (OUTPATIENT)
Dept: PHYSICAL THERAPY | Facility: CLINIC | Age: 64
End: 2019-11-06
Payer: COMMERCIAL

## 2019-11-06 DIAGNOSIS — M54.6 THORACIC SPINE PAIN: Primary | ICD-10-CM

## 2019-11-06 DIAGNOSIS — S29.012A STRAIN OF THORACIC BACK REGION: ICD-10-CM

## 2019-11-06 PROCEDURE — 97140 MANUAL THERAPY 1/> REGIONS: CPT | Performed by: PHYSICAL THERAPIST

## 2019-11-06 PROCEDURE — 97161 PT EVAL LOW COMPLEX 20 MIN: CPT | Performed by: PHYSICAL THERAPIST

## 2019-11-06 NOTE — PROGRESS NOTES
PT Evaluation     Today's date: 2019  Patient name: Lindy Colvin  : 1955  MRN: [id number]  Referring provider: lEysia Otoole PA-C  Dx:   Encounter Diagnosis     ICD-10-CM    1  Thoracic spine pain M54 6    2  Strain of thoracic back region S29 012A Ambulatory referral to Physical Therapy                  Assessment  Assessment details: Pt is a 62 y o  female who presents to outpatient PT with pain, decreased rom, decreased strength and decreased functional mobility  She will benefit from skilled PT to address these deficits in order to achieve her goals and maximize her functional mobility  Understanding of Dx/Px/POC: excellent   Prognosis: good    Goals  Short Term Goals: Independent performance of initial hep  Decrease pain 2 points on VAS      Long Term Goals: Independent performance of comprehensive hep  Work performance is returned to max level of function  Performance of IADL's is returned to max level of function  Performance in recreational activities is improved to max level of function      Plan  Planned therapy interventions: IADL retraining, joint mobilization, manual therapy, abdominal trunk stabilization, strengthening, stretching, therapeutic activities, therapeutic exercise, therapeutic training and home exercise program  Frequency: 2x week  Duration in weeks: 6        Subjective Evaluation    History of Present Illness  Mechanism of injury: Pt reports that she has been having thoracic spine pain for several weeks, insidious onset  Reports that she is taking a mm relaxor and tylenol with mod pain reduction  Reports occasional disturbances at night from pain  Denies radicular symptoms  Reports that she has increased pain when walking community distances      Pain  Current pain rating: 3  At best pain ratin  At worst pain ratin    Patient Goals  Patient goals for therapy: decreased pain, increased motion, increased strength, independence with ADLs/IADLs, return to work and return to sport/leisure activities          Objective     ROM:   Flexion: min limited   Extension: max limited   Right Rotation: mod limited   Left Rotation: mod limited           Poor control of abdominals noted with TaA  Poor control of deep cervical flexors noted with chin tuck     Hypomobility noted with spring testing  Rounded shoulder and foreward head posture noted    Decreased flexibility: UT and levator                  Precautions: OA,       Manual  11/6            Thoracic pa's 4'            DTM L scap retractors 8'                                                       Exercise Diary              ube             pulleys             pball flexion stretch             Doorway pect stretch             Chin tuck             scap protraction stretch             rows             Low rows             TaA             TaA bridge                                                                                                                                                   Modalities              MHP

## 2019-11-07 ENCOUNTER — OFFICE VISIT (OUTPATIENT)
Dept: PHYSICAL THERAPY | Facility: CLINIC | Age: 64
End: 2019-11-07
Payer: COMMERCIAL

## 2019-11-07 DIAGNOSIS — M54.6 THORACIC SPINE PAIN: Primary | ICD-10-CM

## 2019-11-07 PROCEDURE — 97110 THERAPEUTIC EXERCISES: CPT

## 2019-11-07 PROCEDURE — 97140 MANUAL THERAPY 1/> REGIONS: CPT

## 2019-11-07 NOTE — PROGRESS NOTES
Daily Note     Today's date: 2019  Patient name: Suleiman Marie  : 1955  MRN: [id number]  Referring provider: Jasson Taylor PA-C  Dx:   Encounter Diagnosis     ICD-10-CM    1  Thoracic spine pain M54 6                 Subjective: Patient complains of continued thoracic pain - patient states, "I had a hell of a time getting off the couch last night "    Objective: See treatment diary below  Assessment: No complaints of pain reported throughout therapeutic exercise program   Extensive verbal cues required in order to perform theraband rows and low rows correctly  Plan: Continue treatment as per PT plan of care       Precautions: OA    Manual             Thoracic pa's 4' 4' KL           DTM L scap retractors 8' 8'                                                      Exercise Diary              ube retro  6'           pulleys  5"x20           pball flexion stretch  10"x10           Doorway pec stretch  30"x3           Chin tuck supine  5"x10           scap protraction stretch             rows  green  2x10           Low rows  green  2x10           TaA  5"x10           TaA bridge  2x10                                                                                                                                                 Modalities              MHP

## 2019-11-13 ENCOUNTER — OFFICE VISIT (OUTPATIENT)
Dept: PHYSICAL THERAPY | Facility: CLINIC | Age: 64
End: 2019-11-13
Payer: COMMERCIAL

## 2019-11-13 DIAGNOSIS — S29.012A STRAIN OF THORACIC BACK REGION: ICD-10-CM

## 2019-11-13 DIAGNOSIS — M54.6 THORACIC SPINE PAIN: Primary | ICD-10-CM

## 2019-11-13 PROCEDURE — 97110 THERAPEUTIC EXERCISES: CPT

## 2019-11-13 PROCEDURE — 97140 MANUAL THERAPY 1/> REGIONS: CPT

## 2019-11-13 NOTE — PROGRESS NOTES
Daily Note     Today's date: 2019  Patient name: Kaushik Hendrickson  : 1955  MRN: [id number]  Referring provider: Nicole Ascencio PA-C  Dx:   Encounter Diagnosis     ICD-10-CM    1  Thoracic spine pain M54 6    2  Strain of thoracic back region S29 012A                 Subjective: Patient complains of continued thoracic spine pain - "The arthritis doesn't help "    Objective: See treatment diary below  Assessment: Therapeutic exercise program is tolerated well  Pain relief reported with MHP and DTM  Plan: Continue treatment as per PT plan of care       Precautions: OA    Manual            Thoracic pa's 4' 4' KL NP          DTM L scap retractors 8' 8' 8'                                                     Exercise Diary             ube retro  6' 6' with MHP          pulleys  5"x20 5"x20          pball flexion stretch  10"x10 10"x10          Doorway pec stretch  30"x3 30"x3          Chin tuck supine  5"x10 5"x10          scap protraction stretch             rows  green  2x10 green  2x10          Low rows  green  2x10 green  2x10          TaA  5"x10 5"x10          TaA bridge  2x10 2x10                                                                                                                                                Modalities              MHP

## 2019-11-15 ENCOUNTER — OFFICE VISIT (OUTPATIENT)
Dept: PHYSICAL THERAPY | Facility: CLINIC | Age: 64
End: 2019-11-15
Payer: COMMERCIAL

## 2019-11-15 DIAGNOSIS — M54.6 THORACIC SPINE PAIN: Primary | ICD-10-CM

## 2019-11-15 PROCEDURE — 97110 THERAPEUTIC EXERCISES: CPT | Performed by: PHYSICAL THERAPIST

## 2019-11-15 PROCEDURE — 97140 MANUAL THERAPY 1/> REGIONS: CPT | Performed by: PHYSICAL THERAPIST

## 2019-11-19 ENCOUNTER — APPOINTMENT (OUTPATIENT)
Dept: PHYSICAL THERAPY | Facility: CLINIC | Age: 64
End: 2019-11-19
Payer: COMMERCIAL

## 2019-11-21 ENCOUNTER — OFFICE VISIT (OUTPATIENT)
Dept: PHYSICAL THERAPY | Facility: CLINIC | Age: 64
End: 2019-11-21
Payer: COMMERCIAL

## 2019-11-21 DIAGNOSIS — M54.6 THORACIC SPINE PAIN: Primary | ICD-10-CM

## 2019-11-21 DIAGNOSIS — S29.012A STRAIN OF THORACIC BACK REGION: ICD-10-CM

## 2019-11-21 PROCEDURE — 97140 MANUAL THERAPY 1/> REGIONS: CPT

## 2019-11-21 PROCEDURE — 97530 THERAPEUTIC ACTIVITIES: CPT

## 2019-11-21 PROCEDURE — 97110 THERAPEUTIC EXERCISES: CPT

## 2019-11-21 NOTE — PROGRESS NOTES
Daily Note     Today's date: 2019  Patient name: Florian Montiel  : 1955  MRN: [id number]  Referring provider: Jocelyn Horn PA-C  Dx:   Encounter Diagnosis     ICD-10-CM    1  Thoracic spine pain M54 6    2  Strain of thoracic back region S29 012A                 Subjective: Pt reports that she is doing well today with no new complaints  Objective: See treatment diary below  Assessment: Minimal soft tissue restrictions were present in L rhomboid  Mobs were performed by PT, KL  Cuing was needed during chin tucks this session to promote functional form  She continues to make progress towards her long term goals  Plan: Continue treatment as per PT plan of care       Precautions: OA    Manual  11/6 11/7 11/13 11/15 11/21        Thoracic pa's 4' 4' KL NP kl MM        DTM L scap retractors 8' 8' 8' 8' 8'                                                   Exercise Diary   11/7 11/13 11/15 11/21        ube retro  6' 6' with MHP 4'/4' with MHP 4'/4' with MHP        pulleys  5"x20 5"x20 5"x20 5"x20        pball flexion stretch  10"x10 10"x10 10"x10 10x 10"        Doorway pec stretch  30"x3 30"x3 30"x3 30"x3        Chin tuck supine  5"x10 5"x10 5"x10 5"x10        scap protraction stretch             rows  green  2x10 green  2x10 Green tb x20 Green tb x20        Low rows  green  2x10 green  2x10 Green tb x20 Green tb x20        TaA  5"x10 5"x10 5"x10 5"x10        TaA bridge  2x10 2x10 20 20                                                                                                                                              Modalities              MHP

## 2019-11-22 ENCOUNTER — OFFICE VISIT (OUTPATIENT)
Dept: PHYSICAL THERAPY | Facility: CLINIC | Age: 64
End: 2019-11-22
Payer: COMMERCIAL

## 2019-11-22 DIAGNOSIS — S29.012A STRAIN OF THORACIC BACK REGION: ICD-10-CM

## 2019-11-22 DIAGNOSIS — M54.6 THORACIC SPINE PAIN: Primary | ICD-10-CM

## 2019-11-22 PROCEDURE — 97110 THERAPEUTIC EXERCISES: CPT

## 2019-11-22 PROCEDURE — 97140 MANUAL THERAPY 1/> REGIONS: CPT

## 2019-11-22 NOTE — PROGRESS NOTES
Daily Note     Today's date: 2019  Patient name: Sujatha Bowen  : 1955  MRN: [id number]  Referring provider: Goyo Junior PA-C  Dx:   Encounter Diagnosis     ICD-10-CM    1  Thoracic spine pain M54 6    2  Strain of thoracic back region S29 012A               Subjective: Pt cont to report she is feeling good  Objective: See treatment diary below  Assessment: Pt performed exercise program w/o complaint  Pt was very tight during DTM to mid thoracic area  MD, PT performed thoracic PA's  Will monitor  Plan: Continue treatment as per PT plan of care       Precautions: OA    Manual  11/6 11/7 11/13 11/15 11/21 11/22       Thoracic pa's 4' 4' KL NP kl MM 4'MD       DTM L scap retractors 8' 8' 8' 8' 8' 8'                                                  Exercise Diary   11/7 11/13 11/15 11/21 11/22       ube retro  6' 6' with MHP 4'/4' with MHP 4'/4' with MHP 4'/4'  With  MHP       pulleys  5"x20 5"x20 5"x20 5"x20 5"x20       pball flexion stretch  10"x10 10"x10 10"x10 10x 10" 10"x10       Doorway pec stretch  30"x3 30"x3 30"x3 30"x3 30"x3       Chin tuck supine  5"x10 5"x10 5"x10 5"x10 5"x10       scap protraction stretch             rows  green  2x10 green  2x10 Green tb x20 Green tb x20 GTB  x20       Low rows  green  2x10 green  2x10 Green tb x20 Green tb x20 GTB  x20       TaA  5"x10 5"x10 5"x10 5"x10 5"x10       TaA bridge  2x10 2x10 20 20 x20                                                                                                                                             Modalities              MHP

## 2019-11-27 ENCOUNTER — OFFICE VISIT (OUTPATIENT)
Dept: PHYSICAL THERAPY | Facility: CLINIC | Age: 64
End: 2019-11-27
Payer: COMMERCIAL

## 2019-11-27 DIAGNOSIS — M54.6 THORACIC SPINE PAIN: Primary | ICD-10-CM

## 2019-11-27 DIAGNOSIS — S29.012A STRAIN OF THORACIC BACK REGION: ICD-10-CM

## 2019-11-27 PROCEDURE — 97140 MANUAL THERAPY 1/> REGIONS: CPT

## 2019-11-27 PROCEDURE — 97110 THERAPEUTIC EXERCISES: CPT

## 2019-11-27 NOTE — PROGRESS NOTES
Daily Note     Today's date: 2019  Patient name: Sushma Arce  : 1955  MRN: [id number]  Referring provider: Karli Shahid PA-C  Dx:   Encounter Diagnosis     ICD-10-CM    1  Thoracic spine pain M54 6    2  Strain of thoracic back region S29 012A                 Subjective: Patient denies back pain today  Objective: See treatment diary below  Assessment: Therapeutic exercise program is tolerated well without complaints  Tenderness has improved with DTM  Plan: Continue treatment as per PT plan of care       Precautions: OA    Manual  11/6 11/7 11/13 11/15 11/21 11/22 11/27      Thoracic pa's 4' 4' KL NP kl MM 4' MD NP      DTM L scap retractors 8' 8' 8' 8' 8' 8' 8'                                                 Exercise Diary   11/7 11/13 11/15 11/21 11/22 11/27      ube retro  6' 6' with MHP 4'/4' with MHP 4'/4' with MHP 4'/4'  With  MHP 6'  with MHP      pulleys  5"x20 5"x20 5"x20 5"x20 5"x20 5"x20      pball flexion stretch  10"x10 10"x10 10"x10 10x 10" 10"x10 10"x10      Doorway pec stretch  30"x3 30"x3 30"x3 30"x3 30"x3 30"x3      Chin tuck supine  5"x10 5"x10 5"x10 5"x10 5"x10 5"x20      scap protraction stretch             rows  green  2x10 green  2x10 Green tb x20 Green tb x20 GTB  x20 green  2x10      Low rows  green  2x10 green  2x10 Green tb x20 Green tb x20 GTB  x20 green  2x10      TaA  5"x10 5"x10 5"x10 5"x10 5"x10 5"x10      TaA bridge  2x10 2x10 20 20 x20 2x10                                                                                                                                            Modalities              MHP

## 2019-12-06 ENCOUNTER — EVALUATION (OUTPATIENT)
Dept: PHYSICAL THERAPY | Facility: CLINIC | Age: 64
End: 2019-12-06
Payer: COMMERCIAL

## 2019-12-06 DIAGNOSIS — M54.6 THORACIC SPINE PAIN: Primary | ICD-10-CM

## 2019-12-06 DIAGNOSIS — S29.012A STRAIN OF THORACIC BACK REGION: ICD-10-CM

## 2019-12-06 PROCEDURE — 97110 THERAPEUTIC EXERCISES: CPT | Performed by: PHYSICAL THERAPIST

## 2019-12-06 PROCEDURE — 97140 MANUAL THERAPY 1/> REGIONS: CPT | Performed by: PHYSICAL THERAPIST

## 2019-12-06 NOTE — PROGRESS NOTES
PT Re-Evaluation     Today's date: 2019  Patient name: Raleigh Ledbetter  : 1955  MRN: [id number]  Referring provider: Frankey Azure, PA-C  Dx:   Encounter Diagnosis     ICD-10-CM    1  Thoracic spine pain M54 6    2  Strain of thoracic back region S29 012A        Start Time: 0900  Stop Time: 0945  Total time in clinic (min): 45 minutes    Assessment  Assessment details: : patient is a 62 y o  Female who has been constant with physical therapy for 8 visits  She demonstrates improvements in her pain levels, range of motion, strength, and functional ability since the start of PT  She improved her FOTO score to 63 from 45 at IE  Patient demonstrates good understanding of the importance of continuing [facility] and [facility]  Patient is appropriate for D/c at this time  -----  Pt is a 62 y o  female who presents to outpatient PT with pain, decreased rom, decreased strength and decreased functional mobility  She will benefit from skilled PT to address these deficits in order to achieve her goals and maximize her functional mobility  Understanding of Dx/Px/POC: excellent   Prognosis: good    Goals  Short Term Goals: Independent performance of initial hep - MET  Decrease pain 2 points on VAS - MET       Long Term Goals: Independent performance of comprehensive hep - MEt  Work performance is returned to max level of function - MET  Performance of IADL's is returned to max level of function - MET  Performance in recreational activities is improved to max level of function - MET      Plan  Plan details: Patient is d/c at this time    Planned therapy interventions: IADL retraining, joint mobilization, manual therapy, abdominal trunk stabilization, strengthening, stretching, therapeutic activities, therapeutic exercise, therapeutic training and home exercise program  Treatment plan discussed with: patient        Subjective Evaluation    History of Present Illness  Mechanism of injury: : patient reports she has had improvements in her pain levels and functional ability  She mentions that [facility] which she believes has aided in her symptoms, too  She reports having 100% improvements in her symptoms and that she is back to where she was prior to PT   ----   Pt reports that she has been having thoracic spine pain for several weeks, insidious onset  Reports that she is taking a mm relaxor and tylenol with mod pain reduction  Reports occasional disturbances at night from pain  Denies radicular symptoms  Reports that she has increased pain when walking community distances  Pain  Current pain ratin  At best pain ratin  At worst pain ratin    Patient Goals  Patient goals for therapy: decreased pain, increased motion, increased strength, independence with ADLs/IADLs, return to work and return to sport/leisure activities          Objective     ROM:   Flexion: min limited   Extension: min limited   Right Rotation: min limited   Left Rotation: min limited       Fair control of abdominals noted with TaA  Fair control of deep cervical flexors noted with chin tuck     Hypomobility noted with spring testing  Slight improvement in ounded shoulder and foreward head posture noted  Slight improvements in UT & levator flexibility                  Precautions: OA,     Manual  11/6 11/7 11/13 11/15 11/21 11/22 11/27 12/6     Thoracic pa's 4' 4' KL NP kl MM 4' MD NP      DTM L scap retractors 8' 8' 8' 8' 8' 8' 8'      Re-Eval /  D C        SK                                   Exercise Diary   11/7 11/13 11/15 11/21 11/22 11/27 12/6     ube retro  6' 6' with MHP 4'/4' with MHP 4'/4' with MHP 4'/4'  With  MHP 6'  with MHP 8' with MHP     pulleys  5"x20 5"x20 5"x20 5"x20 5"x20 5"x20 5" x20     pball flexion stretch  10"x10 10"x10 10"x10 10x 10" 10"x10 10"x10 10"x10     Doorway pec stretch  30"x3 30"x3 30"x3 30"x3 30"x3 30"x3 30"x3     Chin tuck supine  5"x10 5"x10 5"x10 5"x10 5"x10 5"x20 5"x20     scap protraction stretch             rows  green  2x10 green  2x10 Green tb x20 Green tb x20 GTB  x20 green  2x10 green  2x10     Low rows  green  2x10 green  2x10 Green tb x20 Green tb x20 GTB  x20 green  2x10 green  2x10      TaA  5"x10 5"x10 5"x10 5"x10 5"x10 5"x10 5"x10     TaA bridge  2x10 2x10 20 20 x20 2x10 2x10                                                                                                                                            Modalities              MHP

## 2020-08-31 ENCOUNTER — TELEPHONE (OUTPATIENT)
Dept: FAMILY MEDICINE CLINIC | Facility: CLINIC | Age: 65
End: 2020-08-31

## 2020-08-31 NOTE — TELEPHONE ENCOUNTER
Patient called, said the Claritin is not working for her anymore, she still has watery eyes and a runny nose        She would like you to order something else for her      [facility]      Pt # [phone]

## 2020-09-14 DIAGNOSIS — Z13.820 SCREENING FOR OSTEOPOROSIS: ICD-10-CM

## 2020-09-14 DIAGNOSIS — Z78.0 POSTMENOPAUSAL: ICD-10-CM

## 2020-09-14 DIAGNOSIS — Z12.39 SCREENING FOR MALIGNANT NEOPLASM OF BREAST: Primary | ICD-10-CM

## 2020-11-12 ENCOUNTER — HOSPITAL ENCOUNTER (OUTPATIENT)
Dept: BONE DENSITY | Facility: IMAGING CENTER | Age: 65
Discharge: HOME | End: 2020-11-12
Payer: COMMERCIAL

## 2020-11-12 VITALS — BODY MASS INDEX: 31.71 KG/M2 | HEIGHT: 63 IN | WEIGHT: 179 LBS

## 2020-11-12 DIAGNOSIS — Z12.31 VISIT FOR SCREENING MAMMOGRAM: ICD-10-CM

## 2020-11-12 DIAGNOSIS — Z13.820 SCREENING FOR OSTEOPOROSIS: ICD-10-CM

## 2020-11-12 DIAGNOSIS — Z78.0 POSTMENOPAUSAL: ICD-10-CM

## 2020-11-12 PROCEDURE — 77067 SCR MAMMO BI INCL CAD: CPT

## 2020-11-12 PROCEDURE — 77080 DXA BONE DENSITY AXIAL: CPT

## 2020-11-12 PROCEDURE — 77063 BREAST TOMOSYNTHESIS BI: CPT

## 2020-11-17 ENCOUNTER — TELEPHONE (OUTPATIENT)
Dept: FAMILY MEDICINE CLINIC | Facility: CLINIC | Age: 65
End: 2020-11-17

## 2020-12-09 ENCOUNTER — OFFICE VISIT (OUTPATIENT)
Dept: FAMILY MEDICINE CLINIC | Facility: CLINIC | Age: 65
End: 2020-12-09
Payer: COMMERCIAL

## 2020-12-09 VITALS
TEMPERATURE: 98.2 F | HEIGHT: 61 IN | RESPIRATION RATE: 16 BRPM | DIASTOLIC BLOOD PRESSURE: 70 MMHG | BODY MASS INDEX: 37 KG/M2 | SYSTOLIC BLOOD PRESSURE: 110 MMHG | WEIGHT: 196 LBS | HEART RATE: 72 BPM

## 2020-12-09 DIAGNOSIS — Z00.00 ENCOUNTER FOR SUBSEQUENT ANNUAL WELLNESS VISIT (AWV) IN MEDICARE PATIENT: Primary | ICD-10-CM

## 2020-12-09 DIAGNOSIS — Z13.220 SCREENING, LIPID: ICD-10-CM

## 2020-12-09 DIAGNOSIS — Z13.1 SCREENING FOR DIABETES MELLITUS: ICD-10-CM

## 2020-12-09 PROCEDURE — 3725F SCREEN DEPRESSION PERFORMED: CPT | Performed by: PHYSICIAN ASSISTANT

## 2020-12-09 PROCEDURE — 3008F BODY MASS INDEX DOCD: CPT | Performed by: PHYSICIAN ASSISTANT

## 2020-12-09 PROCEDURE — 1036F TOBACCO NON-USER: CPT | Performed by: PHYSICIAN ASSISTANT

## 2020-12-09 PROCEDURE — G0439 PPPS, SUBSEQ VISIT: HCPCS | Performed by: PHYSICIAN ASSISTANT

## 2021-03-02 ENCOUNTER — OFFICE VISIT (OUTPATIENT)
Dept: FAMILY MEDICINE CLINIC | Facility: CLINIC | Age: 66
End: 2021-03-02
Payer: COMMERCIAL

## 2021-03-02 VITALS
RESPIRATION RATE: 16 BRPM | BODY MASS INDEX: 37.12 KG/M2 | SYSTOLIC BLOOD PRESSURE: 120 MMHG | HEIGHT: 61 IN | DIASTOLIC BLOOD PRESSURE: 70 MMHG | WEIGHT: 196.6 LBS | TEMPERATURE: 97.4 F | HEART RATE: 72 BPM

## 2021-03-02 DIAGNOSIS — G89.29 CHRONIC BILATERAL LOW BACK PAIN WITHOUT SCIATICA: ICD-10-CM

## 2021-03-02 DIAGNOSIS — M54.50 CHRONIC BILATERAL LOW BACK PAIN WITHOUT SCIATICA: ICD-10-CM

## 2021-03-02 DIAGNOSIS — R53.83 FATIGUE, UNSPECIFIED TYPE: ICD-10-CM

## 2021-03-02 DIAGNOSIS — Z12.31 ENCOUNTER FOR SCREENING MAMMOGRAM FOR MALIGNANT NEOPLASM OF BREAST: Primary | ICD-10-CM

## 2021-03-02 PROCEDURE — 3725F SCREEN DEPRESSION PERFORMED: CPT | Performed by: PHYSICIAN ASSISTANT

## 2021-03-02 PROCEDURE — 1036F TOBACCO NON-USER: CPT | Performed by: PHYSICIAN ASSISTANT

## 2021-03-02 PROCEDURE — 99213 OFFICE O/P EST LOW 20 MIN: CPT | Performed by: PHYSICIAN ASSISTANT

## 2021-03-02 PROCEDURE — 3008F BODY MASS INDEX DOCD: CPT | Performed by: PHYSICIAN ASSISTANT

## 2021-03-02 NOTE — PROGRESS NOTES
Assessment/Plan:    1  Chronic bilateral low back pain without sciatica    - discussed need for weight loss and exercise and strengthening, will refer to PT to start  - Ambulatory referral to Physical Therapy; Future  - Ambulatory referral to Weight Management; Future    2  Fatigue, unspecified type    - TSH, 3rd generation with Free T4 reflex; Future  - CBC and differential; Future    3  Obesity    - see #1    F/u 6 months or sooner if needed    Subjective:   Chief Complaint   Patient presents with    Discuss bypass surgery      Patient ID: Evan Anne is a 60 y o  female  Patient here c/o chornic low back pain  Has gained weight recently, 20 lbs, not eating healthy, stopped going to the gym  Now low back bothers her more  The more activity she does the worse her lower back feels  Pain in muscles of lower back, not in spine, does not feel radiation, denies weakness in lower leg, takes no OTC  Talked to a friend and thinks she wants the gastric bypass        The following portions of the patient's history were reviewed and updated as appropriate: allergies, current medications, past family history, past medical history, past social history, past surgical history and problem list     Past Medical History:   Diagnosis Date    Blepharitis of eyelid of left eye      Past Surgical History:   Procedure Laterality Date    DENTAL SURGERY      [place] teeth extraction    HIP ARTHROPLASTY      Right    HIP ARTHROSCOPY      Left    TUBAL LIGATION       Family History   Problem Relation Age of Onset    Kidney cancer Mother     Lung cancer Father     No Known Problems Sister     Mental illness Cousin     No Known Problems Maternal Grandmother     No Known Problems Maternal Grandfather     No Known Problems Paternal Grandmother     No Known Problems Paternal Grandfather     Breast cancer Maternal Aunt         66's    No Known Problems Maternal Aunt     No Known Problems Maternal Aunt     No Known Problems Maternal Aunt     No Known Problems Maternal Aunt     No Known Problems Maternal Aunt     No Known Problems Paternal Aunt     Substance Abuse Neg Hx     Alcohol abuse Neg Hx      Social History     Socioeconomic History    Marital status: Single     Spouse name: Not on file    Number of children: Not on file    Years of education: Not on file    Highest education level: Not on file   Occupational History    Not on file   Social Needs    Financial resource strain: Not on file    Food insecurity     Worry: Not on file     Inability: Not on file    Transportation needs     Medical: Not on file     Non-medical: Not on file   Tobacco Use    Smoking status: Never Smoker    Smokeless tobacco: Never Used   Substance and Sexual Activity    Alcohol use: No    Drug use: No    Sexual activity: Not on file   Lifestyle    Physical activity     Days per week: Not on file     Minutes per session: Not on file    Stress: Not on file   Relationships    Social connections     Talks on phone: Not on file     Gets together: Not on file     Attends [employer] service: Not on file     Active member of club or organization: Not on file     Attends meetings of clubs or organizations: Not on file     Relationship status: Not on file    Intimate partner violence     Fear of current or ex partner: Not on file     Emotionally abused: Not on file     Physically abused: Not on file     Forced sexual activity: Not on file   Other Topics Concern    Not on file   Social History Narrative    Always uses a seatbelt    Caffeine use- 1 cup of coffee per day    Has smoke detectors       Current Outpatient Medications:     ascorbic acid (VITAMIN C) 500 mg tablet, Take 500 mg by mouth daily, Disp: , Rfl:     Calcium Citrate-Vitamin D (CALCIUM CITRATE CHEWY BITE PO), Take by mouth, Disp: , Rfl:     Cholecalciferol (VITAMIN D-3) 1000 units CAPS, Take by mouth daily, Disp: , Rfl:     Coenzyme Q10 (Co Q-10) 100 MG CHEW, Chew, Disp: , Rfl:     [facility] GLUCOSAMINE-CHONDROITIN PO, Take by mouth, Disp: , Rfl:     GINKGO BILOBA PO, Take by mouth, Disp: , Rfl:     loratadine (CLARITIN) 10 mg tablet, Take 1 tablet (10 mg total) by mouth daily, Disp: 30 tablet, Rfl: 0    Misc Natural Products (DAILY HERBS IMMUNE DEFENSE PO), Take by mouth, Disp: , Rfl:     Multiple Minerals-Vitamins (CALCIUM & VIT D3 BONE HEALTH PO), Take by mouth, Disp: , Rfl:     multivitamin (THERAGRAN) TABS, Take 1 tablet by mouth daily, Disp: , Rfl:     Omega-3 Fatty Acids (FISH OIL) 500 MG CAPS, Take by mouth 3 (three) times a day, Disp: , Rfl:     Review of Systems          Objective:    Vitals:    03/02/21 1120   BP: 120/70   BP Location: Left arm   Patient Position: Sitting   Cuff Size: Standard   Pulse: 72   Resp: 16   Temp: (!) 97 4 °F (36 3 °C)   TempSrc: Oral   Weight: 89 2 kg (196 lb 9 6 oz)   Height: 5' 1" (1 549 m)        Physical Exam  Constitutional:       Appearance: Normal appearance  She is well-developed  She is obese  Cardiovascular:      Rate and Rhythm: Normal rate and regular rhythm  Pulses: Normal pulses  Heart sounds: Normal heart sounds  Pulmonary:      Effort: Pulmonary effort is normal       Breath sounds: Normal breath sounds  Musculoskeletal: Normal range of motion  Comments: B/l lumbar muscles not tender but where pain perceived   Skin:     General: Skin is warm  Neurological:      General: No focal deficit present  Mental Status: She is alert and oriented to person, place, and time  Psychiatric:         Mood and Affect: Mood normal          Behavior: Behavior normal          Thought Content: Thought content normal          Judgment: Judgment normal            BMI Counseling: Body mass index is 37 15 kg/m²  The BMI is above normal  Nutrition recommendations include reducing portion sizes and decreasing overall calorie intake  Exercise recommendations include moderate aerobic physical activity for 150 minutes/week

## 2021-03-09 ENCOUNTER — LAB (OUTPATIENT)
Dept: LAB | Facility: HOSPITAL | Age: 66
End: 2021-03-09
Payer: COMMERCIAL

## 2021-03-09 DIAGNOSIS — Z13.220 SCREENING, LIPID: ICD-10-CM

## 2021-03-09 DIAGNOSIS — Z13.1 SCREENING FOR DIABETES MELLITUS: ICD-10-CM

## 2021-03-09 DIAGNOSIS — R53.83 FATIGUE, UNSPECIFIED TYPE: ICD-10-CM

## 2021-03-09 LAB
ALBUMIN SERPL BCP-MCNC: 3.9 G/DL (ref 3.5–5)
ALP SERPL-CCNC: 124 U/L (ref 46–116)
ALT SERPL W P-5'-P-CCNC: 28 U/L (ref 12–78)
ANION GAP SERPL CALCULATED.3IONS-SCNC: 5 MMOL/L (ref 4–13)
AST SERPL W P-5'-P-CCNC: 18 U/L (ref 5–45)
BASOPHILS # BLD AUTO: 0.05 THOUSANDS/ΜL (ref 0–0.1)
BASOPHILS NFR BLD AUTO: 1 % (ref 0–1)
BILIRUB SERPL-MCNC: 0.46 MG/DL (ref 0.2–1)
BUN SERPL-MCNC: 12 MG/DL (ref 5–25)
CALCIUM SERPL-MCNC: 9.1 MG/DL (ref 8.3–10.1)
CHLORIDE SERPL-SCNC: 107 MMOL/L (ref 100–108)
CHOLEST SERPL-MCNC: 162 MG/DL (ref 50–200)
CO2 SERPL-SCNC: 30 MMOL/L (ref 21–32)
CREAT SERPL-MCNC: 0.7 MG/DL (ref 0.6–1.3)
EOSINOPHIL # BLD AUTO: 0.11 THOUSAND/ΜL (ref 0–0.61)
EOSINOPHIL NFR BLD AUTO: 2 % (ref 0–6)
ERYTHROCYTE [DISTWIDTH] IN BLOOD BY AUTOMATED COUNT: 13 % (ref 11.6–15.1)
GFR SERPL CREATININE-BSD FRML MDRD: 92 ML/MIN/1.73SQ M
GLUCOSE P FAST SERPL-MCNC: 88 MG/DL (ref 65–99)
HCT VFR BLD AUTO: 44.1 % (ref 34.8–46.1)
HDLC SERPL-MCNC: 52 MG/DL
HGB BLD-MCNC: 14.4 G/DL (ref 11.5–15.4)
IMM GRANULOCYTES # BLD AUTO: 0.01 THOUSAND/UL (ref 0–0.2)
IMM GRANULOCYTES NFR BLD AUTO: 0 % (ref 0–2)
LDLC SERPL CALC-MCNC: 87 MG/DL (ref 0–100)
LYMPHOCYTES # BLD AUTO: 1.83 THOUSANDS/ΜL (ref 0.6–4.47)
LYMPHOCYTES NFR BLD AUTO: 39 % (ref 14–44)
MCH RBC QN AUTO: 30.4 PG (ref 26.8–34.3)
MCHC RBC AUTO-ENTMCNC: 32.7 G/DL (ref 31.4–37.4)
MCV RBC AUTO: 93 FL (ref 82–98)
MONOCYTES # BLD AUTO: 0.44 THOUSAND/ΜL (ref 0.17–1.22)
MONOCYTES NFR BLD AUTO: 9 % (ref 4–12)
NEUTROPHILS # BLD AUTO: 2.27 THOUSANDS/ΜL (ref 1.85–7.62)
NEUTS SEG NFR BLD AUTO: 49 % (ref 43–75)
NRBC BLD AUTO-RTO: 0 /100 WBCS
PLATELET # BLD AUTO: 240 THOUSANDS/UL (ref 149–390)
PMV BLD AUTO: 11 FL (ref 8.9–12.7)
POTASSIUM SERPL-SCNC: 4.1 MMOL/L (ref 3.5–5.3)
PROT SERPL-MCNC: 7.9 G/DL (ref 6.4–8.2)
RBC # BLD AUTO: 4.74 MILLION/UL (ref 3.81–5.12)
SODIUM SERPL-SCNC: 142 MMOL/L (ref 136–145)
TRIGL SERPL-MCNC: 113 MG/DL
TSH SERPL DL<=0.05 MIU/L-ACNC: 2.09 UIU/ML (ref 0.36–3.74)
WBC # BLD AUTO: 4.71 THOUSAND/UL (ref 4.31–10.16)

## 2021-03-09 PROCEDURE — 80061 LIPID PANEL: CPT

## 2021-03-09 PROCEDURE — 80053 COMPREHEN METABOLIC PANEL: CPT

## 2021-03-09 PROCEDURE — 85025 COMPLETE CBC W/AUTO DIFF WBC: CPT

## 2021-03-09 PROCEDURE — 36415 COLL VENOUS BLD VENIPUNCTURE: CPT

## 2021-03-09 PROCEDURE — 84443 ASSAY THYROID STIM HORMONE: CPT

## 2021-03-10 ENCOUNTER — EVALUATION (OUTPATIENT)
Dept: PHYSICAL THERAPY | Facility: CLINIC | Age: 66
End: 2021-03-10
Payer: COMMERCIAL

## 2021-03-10 ENCOUNTER — TELEPHONE (OUTPATIENT)
Dept: FAMILY MEDICINE CLINIC | Facility: CLINIC | Age: 66
End: 2021-03-10

## 2021-03-10 DIAGNOSIS — G89.29 CHRONIC BILATERAL LOW BACK PAIN WITHOUT SCIATICA: Primary | ICD-10-CM

## 2021-03-10 DIAGNOSIS — M54.50 CHRONIC BILATERAL LOW BACK PAIN WITHOUT SCIATICA: Primary | ICD-10-CM

## 2021-03-10 PROCEDURE — 97162 PT EVAL MOD COMPLEX 30 MIN: CPT | Performed by: PHYSICAL MEDICINE & REHABILITATION

## 2021-03-10 NOTE — PROGRESS NOTES
PT Evaluation     Today's date: 3/10/2021  Patient name: Danielle Clement  : 1955  MRN: [id number]  Referring provider: Alley Melchor PA-C  Dx:   Encounter Diagnosis     ICD-10-CM    1  Chronic bilateral low back pain without sciatica  M54 5 Ambulatory referral to Physical Therapy    G89 29                 Assessment  Assessment details: Pt is a pleasant 60 y o  female presenting to outpatient physical therapy with sgs/sxs consistent with referring diagnosis  Patient presents with pain, decreased range of motion, decreased strength, and decreased tolerance to activity  Pt is a good candidate for outpatient physical therapy and would benefit from skilled physical therapy to address limitations and to achieve goals  Thank you for this referral    Impairments: abnormal coordination, abnormal or restricted ROM, activity intolerance, impaired physical strength and pain with function  Understanding of Dx/Px/POC: good   Prognosis: good    Goals  ST  Patient will report 25% decrease in pain in 4 weeks  2  Patient will demonstrate 25% improvement in ROM in 4 weeks  3  Patient will demonstrate 1/2 grade improvement in strength in 4 weeks  LT  Patient will be able to perform IADLS without restriction or pain by discharge  2  Patient will be independent in HEP by discharge  3  Patient will be able to return to recreational/work duties without restriction or pain by discharge        Plan  Patient would benefit from: PT eval and skilled PT  Planned modality interventions: cryotherapy and thermotherapy: hydrocollator packs  Planned therapy interventions: IADL retraining, body mechanics training, flexibility, functional ROM exercises, home exercise program, neuromuscular re-education, manual therapy, postural training, strengthening, stretching, therapeutic activities, therapeutic exercise and joint mobilization  Frequency: 2x week  Duration in visits: 12  Duration in weeks: 6  Treatment plan discussed with: patient        Subjective Evaluation    History of Present Illness  Mechanism of injury: Patient presents with c/o LBP present for about 1 month of insidious onset  Patient believes stress is a factor  Increased pain with all weightbearing activity  Patient denies radiation of symptoms, N/T  Patient reports she is looking to lose 30 pounds to reduce strain on bilateral hip replacements  Patient notes limited stairclimbing/aerobic capacity limited by ARREGUIN  Patient denies medication use for pain control, seated rest is main easing factor     Pain  Current pain ratin  At best pain ratin  At worst pain ratin    Treatments  No previous or current treatments  Patient Goals  Patient goal: Get this weight off me        Objective     Static Posture     Comments  Gait: increased trunk flexion, L knee valgus, decreased step length and overall gait speed  Thoracolumbar ROM grossly WFL, increased pain with return from flexion, also at end range extension    Strength  HS 4/5   Knee ext 4+/5  Able to perform hip flexion in short sit with significant trunk compensation, at least 3/5  Significant upper body compensation with attempted prone hip extension    TTP through R QL  Discomfort with CPA and UPA at L1/2    Poor TA and oblique contraction despite cueing    Significant difficulty with mat mobility within session                 Precautions: B TRIP      Manuals             R QL TPR                                                    Neuro Re-Ed             TA retraining             Standing press down into pball for core activation                                                                              Ther Ex             Bike             Lateral stepping             STS             Wobble board             Palloff press                                                    Ther Activity                                       Gait Training                                       Modalities

## 2021-03-10 NOTE — TELEPHONE ENCOUNTER
We sent her a post card yesterday with her numbers, but they all look great!  Choelsterol, sugar, kidney, liver, thyroid, blood count all within range

## 2021-03-12 ENCOUNTER — OFFICE VISIT (OUTPATIENT)
Dept: PHYSICAL THERAPY | Facility: CLINIC | Age: 66
End: 2021-03-12
Payer: COMMERCIAL

## 2021-03-12 DIAGNOSIS — G89.29 CHRONIC BILATERAL LOW BACK PAIN WITHOUT SCIATICA: Primary | ICD-10-CM

## 2021-03-12 DIAGNOSIS — M54.50 CHRONIC BILATERAL LOW BACK PAIN WITHOUT SCIATICA: Primary | ICD-10-CM

## 2021-03-12 PROCEDURE — 97110 THERAPEUTIC EXERCISES: CPT

## 2021-03-12 PROCEDURE — 97112 NEUROMUSCULAR REEDUCATION: CPT

## 2021-03-17 ENCOUNTER — OFFICE VISIT (OUTPATIENT)
Dept: PHYSICAL THERAPY | Facility: CLINIC | Age: 66
End: 2021-03-17
Payer: COMMERCIAL

## 2021-03-17 DIAGNOSIS — G89.29 CHRONIC BILATERAL LOW BACK PAIN WITHOUT SCIATICA: Primary | ICD-10-CM

## 2021-03-17 DIAGNOSIS — M54.50 CHRONIC BILATERAL LOW BACK PAIN WITHOUT SCIATICA: Primary | ICD-10-CM

## 2021-03-17 PROCEDURE — 97110 THERAPEUTIC EXERCISES: CPT

## 2021-03-17 PROCEDURE — 97140 MANUAL THERAPY 1/> REGIONS: CPT

## 2021-03-17 NOTE — PROGRESS NOTES
Daily Note     Today's date: 3/17/2021  Patient name: Lily Kothari  : 1955  MRN: [id number]  Referring provider: Natasha Hampton PA-C  Dx:   Encounter Diagnosis     ICD-10-CM    1  Chronic bilateral low back pain without sciatica  M54 5     G89 29                   Subjective: Patient attributes her back pain to arthritis  Patient reports she would like to lose weight to take stress off B/L hip joints  Objective: See treatment diary below  Assessment: Progression of therapeutic exercise program is tolerated well  Patient has loss of balance on one occasion when performing sit to stand transfers with feet on airex however she is able to quickly recover from this  Relief of back pain reported with trigger point massage  Patient will benefit from continued PT to improve her back pain and overall strength  Plan: Continue treatment as per PT plan of care  Precautions: B TRIP      Manuals  3/12 3/17          R QL TPR   8'                                                 Neuro Re-Ed             TA retraining  PPT w/ Tball Sq  20x 5" 5"x20          Standing press down into pball for core activation  Seated 10x 5"           Pt education  Importance of posture, biomechanics                                                               Ther Ex             Bike  8' 8'          Lateral stepping  Red @ knee 3x12' red  12ft x8          Sit to stand  2x10  feet on airex  10          Wobble board             Palloff press             Hip abduction  Stand 2# 2x10 each    stand  2#  3x10 ea          Hip Extension             SKTC  5x10" each 10"x5 ea          Pball lumbar flex 3 way  10x 5" each 5"x10 ea          LTR             Hamstring curls     30#   2x10                                    Ther Activity                                       Gait Training                                       Modalities

## 2021-03-19 ENCOUNTER — OFFICE VISIT (OUTPATIENT)
Dept: PHYSICAL THERAPY | Facility: CLINIC | Age: 66
End: 2021-03-19
Payer: COMMERCIAL

## 2021-03-19 DIAGNOSIS — G89.29 CHRONIC BILATERAL LOW BACK PAIN WITHOUT SCIATICA: Primary | ICD-10-CM

## 2021-03-19 DIAGNOSIS — M54.50 CHRONIC BILATERAL LOW BACK PAIN WITHOUT SCIATICA: Primary | ICD-10-CM

## 2021-03-19 PROCEDURE — 97140 MANUAL THERAPY 1/> REGIONS: CPT

## 2021-03-19 PROCEDURE — 97110 THERAPEUTIC EXERCISES: CPT

## 2021-03-19 NOTE — PROGRESS NOTES
Daily Note     Today's date: 3/19/2021  Patient name: Tunde Mo  : 1955  MRN: [id number]  Referring provider: Diamante Edmonds PA-C  Dx:   Encounter Diagnosis     ICD-10-CM    1  Chronic bilateral low back pain without sciatica  M54 5     G89 29                   Subjective: Patient reports relief of low back pain after Wednesday's PT treatment "until I woke up this morning "  Patient reports her back is sometimes more stiff and painful early in the morning  Objective: See treatment diary below  Assessment: Fatigue noted in B/L lower extremities when performing theraband sidestepping  Patient will benefit from continued PT to improve her back pain and overall strength      Plan: Continue treatment as per PT plan of care  Precautions: B TRIP      Manuals  3/12 3/17 3/19         R QL TPR   8' 8'                                                Neuro Re-Ed             TA retraining  PPT w/ Tball Sq  20x 5" 5"x20 5"x30         Standing press down into pball for core activation  Seated 10x 5"           Pt education  Importance of posture, biomechanics                                                               Ther Ex             Bike  8' 8' 10'         Lateral stepping  Red @ knee 3x12' red  12ft x8 green  12ft x4         Sit to stand  2x10  feet on airex  10 feet on airex  10         Wobble board             Palloff press             Hip abduction  Stand 2# 2x10 each    stand  2#  3x10 ea stand  3#  30 ea         Hip Extension             SKTC  5x10" each 10"x5 ea 10"x10 ea         Pball lumbar flex 3 way  10x 5" each 5"x10 ea 5"x10 ea         LTR             Hamstring curls     30#   2x10  30#   3x10         rows     green   3x10         pball wall squats     3x10                                   Ther Activity                                       Gait Training                                       Modalities

## 2021-03-24 ENCOUNTER — OFFICE VISIT (OUTPATIENT)
Dept: PHYSICAL THERAPY | Facility: CLINIC | Age: 66
End: 2021-03-24
Payer: COMMERCIAL

## 2021-03-24 DIAGNOSIS — M54.50 CHRONIC BILATERAL LOW BACK PAIN WITHOUT SCIATICA: Primary | ICD-10-CM

## 2021-03-24 DIAGNOSIS — G89.29 CHRONIC BILATERAL LOW BACK PAIN WITHOUT SCIATICA: Primary | ICD-10-CM

## 2021-03-24 PROCEDURE — 97112 NEUROMUSCULAR REEDUCATION: CPT | Performed by: PHYSICAL THERAPIST

## 2021-03-24 PROCEDURE — 97110 THERAPEUTIC EXERCISES: CPT | Performed by: PHYSICAL THERAPIST

## 2021-03-24 NOTE — PROGRESS NOTES
Daily Note     Today's date: 3/24/2021  Patient name: Naomie Lowery  : 1955  MRN: [id number]  Referring provider: Charles Belcher PA-C  Dx:   Encounter Diagnosis     ICD-10-CM    1  Chronic bilateral low back pain without sciatica  M54 5     G89 29                   Subjective: pt reports no sig changes since her LV but that overall she feels better after attending PT    Objective: See treatment diary below  Assessment: continued TP's noted t/o L QL today  Progressed therex as listed with no complaints  Plan: Continue treatment as per PT plan of care  Precautions: B TRIP      Manuals  3/12 3/17 3/19 3/24        R QL TPR   8' 8' kl                                               Neuro Re-Ed             TA retraining  PPT w/ Tball Sq  20x 5" 5"x20 5"x30 5"x30        Standing press down into pball for core activation  Seated 10x 5"           Pt education  Importance of posture, biomechanics                                                               Ther Ex             Bike  8' 8' 10' 10'        Lateral stepping  Red @ knee 3x12' red  12ft x8 green  12ft x4 Green 12ftx6        Sit to stand  2x10  feet on airex  10 feet on airex  10 nv        Wobble board             Palloff press             Hip abduction  Stand 2# 2x10 each    stand  2#  3x10 ea stand  3#  30 ea 3#x30        Hip Extension             SKTC  5x10" each 10"x5 ea 10"x10 ea 10"x10        Pball lumbar flex 3 way  10x 5" each 5"x10 ea 5"x10 ea 5"x10ea        LTR             Hamstring curls     30#   2x10  30#   3x10 30#x30        rows     green   3x10 Green x30        pball wall squats     3x10 30                                  Ther Activity                                       Gait Training                                       Modalities

## 2021-03-26 ENCOUNTER — OFFICE VISIT (OUTPATIENT)
Dept: PHYSICAL THERAPY | Facility: CLINIC | Age: 66
End: 2021-03-26
Payer: COMMERCIAL

## 2021-03-26 DIAGNOSIS — M54.50 CHRONIC BILATERAL LOW BACK PAIN WITHOUT SCIATICA: Primary | ICD-10-CM

## 2021-03-26 DIAGNOSIS — G89.29 CHRONIC BILATERAL LOW BACK PAIN WITHOUT SCIATICA: Primary | ICD-10-CM

## 2021-03-26 PROCEDURE — 97110 THERAPEUTIC EXERCISES: CPT

## 2021-03-26 PROCEDURE — 97140 MANUAL THERAPY 1/> REGIONS: CPT

## 2021-03-26 NOTE — PROGRESS NOTES
Daily Note     Today's date: 3/26/2021  Patient name: Diane Hale  : 1955  MRN: [id number]  Referring provider: Kemal Antunez PA-C  Dx:   Encounter Diagnosis     ICD-10-CM    1  Chronic bilateral low back pain without sciatica  M54 5     G89 29                   Subjective: Patient reports she has been feeling good after attending PT however she still experiences back pain when walking farther distances  Objective: See treatment diary below  Assessment: Patient is doing well with therapeutic exercise program - she is able to increase repetitions for several exercises today  Patient will benefit from continued PT to improve back pain and walking tolerance  Plan: Continue treatment as per PT plan of care  Precautions: B TRIP      Manuals  3/12 3/17 3/19 3/24 3/26       R QL TPR   8' 8' kl 8' JLW                                              Neuro Re-Ed             TA retraining  PPT w/ Tball Sq  20x 5" 5"x20 5"x30 5"x30 5"x30       Standing press down into pball for core activation  Seated 10x 5"           Pt education  Importance of posture, biomechanics                                                               Ther Ex             Bike  8' 8' 10' 10' 10'       Lateral stepping  Red @ knee 3x12' red  12ft x8 green  12ft x4 Green 12ftx6 green  12ft x6       Sit to stand  2x10  feet on airex  10 feet on airex  10 nv feet on airex  20       Wobble board             Palloff press             Hip abduction  Stand 2# 2x10 each    stand  2#  3x10 ea stand  3#  30 ea 3#x30 stand  3#  30 ea        prone hip extension      20 ea       SKTC  5x10" each 10"x5 ea 10"x10 ea 10"x10 10"x10       Pball lumbar flex 3 way  10x 5" each 5"x10 ea 5"x10 ea 5"x10ea 5"x10 ea       LTR             Hamstring curls     30#   2x10  30#   3x10 30#x30  30#   4x10       rows     green   3x10 Green x30  blue   4x10       pball wall squats     3x10 30  4x10                                 Ther Activity Gait Training                                       Modalities

## 2021-03-31 ENCOUNTER — OFFICE VISIT (OUTPATIENT)
Dept: PHYSICAL THERAPY | Facility: CLINIC | Age: 66
End: 2021-03-31
Payer: COMMERCIAL

## 2021-03-31 DIAGNOSIS — G89.29 CHRONIC BILATERAL LOW BACK PAIN WITHOUT SCIATICA: Primary | ICD-10-CM

## 2021-03-31 DIAGNOSIS — M54.50 CHRONIC BILATERAL LOW BACK PAIN WITHOUT SCIATICA: Primary | ICD-10-CM

## 2021-03-31 PROCEDURE — 97110 THERAPEUTIC EXERCISES: CPT

## 2021-03-31 NOTE — PROGRESS NOTES
Daily Note     Today's date: 3/31/2021  Patient name: Fariha Zeng  : 1955  MRN: [id number]  Referring provider: Konnie Seip, PA-C  Dx:   Encounter Diagnosis     ICD-10-CM    1  Chronic bilateral low back pain without sciatica  M54 5     G89 29                   Subjective: Patient reports she was standing for approximately 4 hours yesterday which caused an increase in back pain  Patient reports she hasn't been on her feet as much today therefore she is feeling better  Objective: See treatment diary below  Assessment: Progression of therapeutic exercise program is tolerated well and patient is pain free post treatment - therefore trigger point release is not performed  Patient will benefit from continued PT to improve back pain and standing tolerance  Plan: Continue treatment as per PT plan of care  Precautions: B TRIP      Manuals  3/12 3/17 3/19 3/24 3/26 3/31      R QL TPR   8' 8' kl 8' JLW NP                                             Neuro Re-Ed             TA retraining  PPT w/ Tball Sq  20x 5" 5"x20 5"x30 5"x30 5"x30       Standing press down into pball for core activation  Seated 10x 5"           Pt education  Importance of posture, biomechanics                                                               Ther Ex             Bike  8' 8' 10' 10' 10' 10'      Lateral stepping  Red @ knee 3x12' red  12ft x8 green  12ft x4 Green 12ftx6 green  12ft x6 green  12ft x6      Sit to stand  2x10  feet on airex  10 feet on airex  10 nv feet on airex  20       Wobble board             Palloff press             Hip abduction  Stand 2# 2x10 each    stand  2#  3x10 ea stand  3#  30 ea 3#x30 stand  3#  30 ea stand  3#  30 ea       prone hip extension      20 ea 20 ea      SKTC  5x10" each 10"x5 ea 10"x10 ea 10"x10 10"x10 10"x10 ea      Pball lumbar flex 3 way  10x 5" each 5"x10 ea 5"x10 ea 5"x10ea 5"x10 ea 5"x10 ea      LTR             Hamstring curls     30#   2x10  30#   3x10 30#x30 30#   4x10  40#   4x10      rows     green   3x10 Green x30  blue   4x10  blue   3x10      pball wall squats     3x10 30  4x10       Leg press squats        30#   2x10                   Ther Activity                                       Gait Training                                       Modalities

## 2021-04-05 ENCOUNTER — IMMUNIZATIONS (OUTPATIENT)
Dept: FAMILY MEDICINE CLINIC | Facility: HOSPITAL | Age: 66
End: 2021-04-05

## 2021-04-05 DIAGNOSIS — Z23 ENCOUNTER FOR IMMUNIZATION: Primary | ICD-10-CM

## 2021-04-05 PROCEDURE — 91300 SARS-COV-2 / COVID-19 MRNA VACCINE (PFIZER-BIONTECH) 30 MCG: CPT

## 2021-04-05 PROCEDURE — 0001A SARS-COV-2 / COVID-19 MRNA VACCINE (PFIZER-BIONTECH) 30 MCG: CPT

## 2021-04-07 ENCOUNTER — OFFICE VISIT (OUTPATIENT)
Dept: PHYSICAL THERAPY | Facility: CLINIC | Age: 66
End: 2021-04-07
Payer: COMMERCIAL

## 2021-04-07 DIAGNOSIS — G89.29 CHRONIC BILATERAL LOW BACK PAIN WITHOUT SCIATICA: Primary | ICD-10-CM

## 2021-04-07 DIAGNOSIS — M54.50 CHRONIC BILATERAL LOW BACK PAIN WITHOUT SCIATICA: Primary | ICD-10-CM

## 2021-04-07 PROCEDURE — 97112 NEUROMUSCULAR REEDUCATION: CPT | Performed by: PHYSICAL THERAPIST

## 2021-04-07 PROCEDURE — 97110 THERAPEUTIC EXERCISES: CPT | Performed by: PHYSICAL THERAPIST

## 2021-04-07 NOTE — PROGRESS NOTES
Daily Note     Today's date: 2021  Patient name: Cameron Granados  : 1955  MRN: [id number]  Referring provider: Joni Simon PA-C  Dx:   Encounter Diagnosis     ICD-10-CM    1  Chronic bilateral low back pain without sciatica  M54 5     G89 29                   Subjective: pt reports that she had been feeling good but that she overdid it 2 days ago when moving furniture  Objective: See treatment diary below  Assessment: Progressed therex as listed with no complaints  Pt is initially more tender t/o R lumbar paraspinal then previously, but reports decreased pain after manual tx  Plan: Continue treatment as per PT plan of care  Precautions: B TRIP      Manuals  3/12 3/17 3/19 3/24 3/26 3/31 4/7     R QL TPR   8' 8' kl 8' JLW NP kl                                            Neuro Re-Ed             TA retraining  PPT w/ Tball Sq  20x 5" 5"x20 5"x30 5"x30 5"x30       Standing press down into pball for core activation  Seated 10x 5"           Pt education  Importance of posture, biomechanics                                                               Ther Ex             Bike  8' 8' 10' 10' 10' 10' 8'     Lateral stepping  Red @ knee 3x12' red  12ft x8 green  12ft x4 Green 12ftx6 green  12ft x6 green  12ft x6 Green x8     Sit to stand  2x10  feet on airex  10 feet on airex  10 nv feet on airex  20       Wobble board             Palloff press             Hip abduction  Stand 2# 2x10 each    stand  2#  3x10 ea stand  3#  30 ea 3#x30 stand  3#  30 ea stand  3#  30 ea 3#30      prone hip extension      20 ea 20 ea x20     SKTC  5x10" each 10"x5 ea 10"x10 ea 10"x10 10"x10 10"x10 ea 10"x10     Pball lumbar flex 3 way  10x 5" each 5"x10 ea 5"x10 ea 5"x10ea 5"x10 ea 5"x10 ea 5"x10ea     LTR             Hamstring curls     30#   2x10  30#   3x10 30#x30  30#   4x10  40#   4x10 40#3x10     rows     green   3x10 Green x30  blue   4x10  blue   3x10 Blue x30     pball wall squats     3x10 30  4x10 Leg press squats        30#   2x10 30#x40                  Ther Activity                                       Gait Training                                       Modalities

## 2021-04-09 ENCOUNTER — APPOINTMENT (OUTPATIENT)
Dept: PHYSICAL THERAPY | Facility: CLINIC | Age: 66
End: 2021-04-09
Payer: COMMERCIAL

## 2021-04-12 ENCOUNTER — OFFICE VISIT (OUTPATIENT)
Dept: PHYSICAL THERAPY | Facility: CLINIC | Age: 66
End: 2021-04-12
Payer: COMMERCIAL

## 2021-04-12 DIAGNOSIS — M54.50 CHRONIC BILATERAL LOW BACK PAIN WITHOUT SCIATICA: Primary | ICD-10-CM

## 2021-04-12 DIAGNOSIS — G89.29 CHRONIC BILATERAL LOW BACK PAIN WITHOUT SCIATICA: Primary | ICD-10-CM

## 2021-04-12 PROCEDURE — 97140 MANUAL THERAPY 1/> REGIONS: CPT

## 2021-04-12 PROCEDURE — 97110 THERAPEUTIC EXERCISES: CPT

## 2021-04-12 NOTE — PROGRESS NOTES
Daily Note     Today's date: 2021  Patient name: Sumi Blancas  : 1955  MRN: [id number]  Referring provider: Loraine Gray PA-C  Dx:   Encounter Diagnosis     ICD-10-CM    1  Chronic bilateral low back pain without sciatica  M54 5     G89 29                   Subjective: Patient is complaining of back pain today - believes "the crazy weather" is effecting her arthritis  Objective: See treatment diary below  Assessment: Progression of therapeutic exercise program is tolerated well  Patient would benefit from continued PT to improve back pain and function  Plan: Continue treatment as per PT plan of care  Precautions: B TRIP      Manuals  3/12 3/17 3/19 3/24 3/26 3/31 4/7 4/12    R QL TPR   8' 6' kl 8' JLW NP kl 8' JLW                                           Neuro Re-Ed             TA retraining  PPT w/ Tball Sq  20x 5" 5"x20 5"x30 5"x30 5"x30       Standing press down into pball for core activation  Seated 10x 5"           Pt education  Importance of posture, biomechanics                                                               Ther Ex             Bike  8' 8' 10' 10' 10' 10' 8' 10'    Lateral stepping  Red @ knee 3x12' red  12ft x8 green  12ft x4 Green 12ftx6 green  12ft x6 green  12ft x6 Green x8 green  12ft x8    Sit to stand  2x10  feet on airex  10 feet on airex  10 nv feet on airex  20       Wobble board             Palloff press             Hip abduction  Stand 2# 2x10 each    stand  2#  3x10 ea stand  3#  30 ea 3#x30 stand  3#  30 ea stand  3#  30 ea 3#30 stand  3#  30 ea     prone hip extension      20 ea 20 ea x20 20 ea    SKTC  5x10" each 10"x5 ea 10"x10 ea 10"x10 10"x10 10"x10 ea 10"x10     Pball lumbar flex 3 way  10x 5" each 5"x10 ea 5"x10 ea 5"x10ea 5"x10 ea 5"x10 ea 5"x10ea 5"x10 ea    LTR             Hamstring curls     30#   2x10  30#   3x10 30#x30  30#   4x10  40#   4x10 40#3x10  40#   4x10    rows     green   3x10 Green x30  blue   4x10  blue   3x10 Blue x30 blue   30    pball wall squats     3x10 30  4x10       Leg press squats        30#   2x10 30#x40  40#   4x10                 Ther Activity                                       Gait Training                                       Modalities

## 2021-04-16 ENCOUNTER — OFFICE VISIT (OUTPATIENT)
Dept: PHYSICAL THERAPY | Facility: CLINIC | Age: 66
End: 2021-04-16
Payer: COMMERCIAL

## 2021-04-16 DIAGNOSIS — G89.29 CHRONIC BILATERAL LOW BACK PAIN WITHOUT SCIATICA: Primary | ICD-10-CM

## 2021-04-16 DIAGNOSIS — M54.50 CHRONIC BILATERAL LOW BACK PAIN WITHOUT SCIATICA: Primary | ICD-10-CM

## 2021-04-16 PROCEDURE — 97140 MANUAL THERAPY 1/> REGIONS: CPT

## 2021-04-16 PROCEDURE — 97110 THERAPEUTIC EXERCISES: CPT

## 2021-04-16 NOTE — PROGRESS NOTES
Daily Note     Today's date: 2021  Patient name: Marilu Cobian  : 1955  MRN: [id number]  Referring provider: Pheobe Apgar, PA-C  Dx:   Encounter Diagnosis     ICD-10-CM    1  Chronic bilateral low back pain without sciatica  M54 5     G89 29                   Subjective: Patient noted LBP  Objective: See treatment diary below      Assessment: Tolerated treatment fair  VC to slow down speed for performing hamstring curls on machine  Patient exhibited good technique with therapeutic exercises and would benefit from continued PT  Plan: Continue per plan of care  Precautions: B TRIP      Manuals  3/12 3/17 3/19 3/24 3/26 3/31 4/7 4/12 4/16   R QL TPR   8' 6' kl 8' JLW NP kl 8' JLW 8'AF                                          Neuro Re-Ed             TA retraining  PPT w/ Tball Sq  20x 5" 5"x20 5"x30 5"x30 5"x30       Standing press down into pball for core activation  Seated 10x 5"           Pt education  Importance of posture, biomechanics                                                               Ther Ex             Bike  8' 8' 10' 10' 10' 10' 8' 10' 10'   Lateral stepping  Red @ knee 3x12' red  12ft x8 green  12ft x4 Green 12ftx6 green  12ft x6 green  12ft x6 Green x8 green  12ft x8 GTB 12ftx8   Sit to stand  2x10  feet on airex  10 feet on airex  10 nv feet on airex  20       Wobble board             Palloff press             Hip abduction  Stand 2# 2x10 each    stand  2#  3x10 ea stand  3#  30 ea 3#x30 stand  3#  30 ea stand  3#  30 ea 3#30 stand  3#  30 ea stand  3#  30 ea    prone hip extension      20 ea 20 ea x20 20 ea    SKTC  5x10" each 10"x5 ea 10"x10 ea 10"x10 10"x10 10"x10 ea 10"x10     Pball lumbar flex 3 way  10x 5" each 5"x10 ea 5"x10 ea 5"x10ea 5"x10 ea 5"x10 ea 5"x10ea 5"x10 ea 5"x10 ea   LTR             Hamstring curls     30#   2x10  30#   3x10 30#x30  30#   4x10  40#   4x10 40#3x10  40#   4x10 40#   4x10   rows     green   3x10 Green x30  blue   4x10  blue   3x10 Blue x30  blue   30    pball wall squats     3x10 30  4x10       Leg press squats        30#   2x10 30#x40  40#   4x10 40# 30x                Ther Activity                                       Gait Training                                       Modalities

## 2021-04-21 ENCOUNTER — OFFICE VISIT (OUTPATIENT)
Dept: PHYSICAL THERAPY | Facility: CLINIC | Age: 66
End: 2021-04-21
Payer: COMMERCIAL

## 2021-04-21 DIAGNOSIS — G89.29 CHRONIC BILATERAL LOW BACK PAIN WITHOUT SCIATICA: Primary | ICD-10-CM

## 2021-04-21 DIAGNOSIS — M54.50 CHRONIC BILATERAL LOW BACK PAIN WITHOUT SCIATICA: Primary | ICD-10-CM

## 2021-04-21 PROCEDURE — 97110 THERAPEUTIC EXERCISES: CPT

## 2021-04-21 NOTE — PROGRESS NOTES
Daily Note     Today's date: 2021  Patient name: Jose Robins  : 1955  MRN: [id number]  Referring provider: Enriqueta Meadows PA-C  Dx:   Encounter Diagnosis     ICD-10-CM    1  Chronic bilateral low back pain without sciatica  M54 5     G89 29                   Subjective: Patient states, "It's okay right now "  Yesterday patient reports she did laundry at the [address] [facility], washed her car, and washed her neighbors car - reports back pain was increased afterwards  Objective: See treatment diary below  Assessment: Verbal cues needed for form during side-stepping to keep toes pointed forward  Difficulty R > L when performing clamshells in sidelying possibly due to right hip weakness  Patient is doing well with strengthening exercises on leg press and knee flexion machines therefore considering increasing weight for these exercises next visit  Plan: Continue per plan of care           Precautions: B TRIP      Manuals 4/21   3/19 3/24 3/26 3/31 4/7 4/12 4/16   R QL TPR    8' kl 8' JLW NP kl 8' JLW 8'AF                                          Neuro Re-Ed             TA retraining    5"x30 5"x30 5"x30       Standing press down into pball for core activation             Pt education                                                                 Ther Ex             Bike  10'   10' 10' 10' 10' 8' 10' 10'   Lateral stepping Blue tb  12ft x 8   green  12ft x4 Green 12ftx6 green  12ft x6 green  12ft x6 Green x8 green  12ft x8 GTB 12ftx8   Sit to stand    feet on airex  10 nv feet on airex  20       bridges 5" 2x10            Palloff press NP            Hip abduction stand 3# 30 ea   stand  3#  30 ea 3#x30 stand  3#  30 ea stand  3#  30 ea 3#30 stand  3#  30 ea stand  3#  30 ea   Clamshell sidelying 20 ea             prone hip extension 20 ea     20 ea 20 ea x20 20 ea    SKTC NP   10"x10 ea 10"x10 10"x10 10"x10 ea 10"x10     Pball lumbar flex 3 way 5"x10   5"x10 ea 5"x10ea 5"x10 ea 5"x10 ea 5"x10ea 5"x10 ea 5"x10 ea   LTR np            Hamstring curls  40# 5x10  50#   30#   3x10 30#x30  30#   4x10  40#   4x10 40#3x10  40#   4x10 40#   4x10   rows Black  3x10    green   3x10 Green x30  blue   4x10  blue   3x10 Blue x30  blue   30    pball wall squats np    3x10 30  4x10       Leg press squats 40#  3x10  50#      30#   2x10 30#x40  40#   4x10 40# 30x                Ther Activity                                       Gait Training                                       Modalities

## 2021-04-23 ENCOUNTER — OFFICE VISIT (OUTPATIENT)
Dept: PHYSICAL THERAPY | Facility: CLINIC | Age: 66
End: 2021-04-23
Payer: COMMERCIAL

## 2021-04-23 DIAGNOSIS — G89.29 CHRONIC BILATERAL LOW BACK PAIN WITHOUT SCIATICA: Primary | ICD-10-CM

## 2021-04-23 DIAGNOSIS — M54.50 CHRONIC BILATERAL LOW BACK PAIN WITHOUT SCIATICA: Primary | ICD-10-CM

## 2021-04-23 PROCEDURE — 97110 THERAPEUTIC EXERCISES: CPT

## 2021-04-23 NOTE — PROGRESS NOTES
Daily Note     Today's date: 2021  Patient name: Xu Foy  : 1955  MRN: [id number]  Referring provider: Karishma Salazar PA-C  Dx:   Encounter Diagnosis     ICD-10-CM    1  Chronic bilateral low back pain without sciatica  M54 5     G89 29                   Subjective: Post last PT session patient reports fatigue, but no LBP reported  After today's treatment patient reported that LBP intermittently increases during walking activities, but decreases at rest  Pt reported that she will be helping with a food drive this weekend  Patient was encouraged to be on light duty while assisting with food drive  Objective: See treatment diary below  Assessment: Patient had good form & endurance during leg press weight progression  Mild LBP post clamshells & during prone hip extension - pain resolved with rest  Clamshell R hip ROM increased since last session  Patient would benefit from continued PT to strengthen lower back & continue decreasing LBP  Plan: Continue per plan of care           Precautions: B TRIP      Manuals 4/21 4/23  3/19 3/24 3/26 3/31 4/7 4/12 4/16   R QL TPR    8' kl 8' JLW NP kl 8' JLW 8'AF                                          Neuro Re-Ed             TA retraining    5"x30 5"x30 5"x30       Standing press down into pball for core activation             Pt education                                                                 Ther Ex             Bike  10' Upright  10'  10' 10' 10' 10' 8' 10' 10'   Lateral stepping Blue tb  12ft x 8 Blue tb  12ft x8   green  12ft x4 Green 12ftx6 green  12ft x6 green  12ft x6 Green x8 green  12ft x8 GTB 12ftx8   Sit to stand  np  feet on airex  10 nv feet on airex  20       bridges 5" 2x10 5" 2x10           Palloff press NP NP           Hip abduction stand 3# 30 ea Stand  3# 30 ea  stand  3#  30 ea 3#x30 stand  3#  30 ea stand  3#  30 ea 3#30 stand  3#  30 ea stand  3#  30 ea   Clamshell sidelying 20 ea sidelying 20 ea            prone hip extension 20 ea 20 ea    20 ea 20 ea x20 20 ea    SKTC NP np  10"x10 ea 10"x10 10"x10 10"x10 ea 10"x10     Pball lumbar flex 3 way 5"x10 5"x10  5"x10 ea 5"x10ea 5"x10 ea 5"x10 ea 5"x10ea 5"x10 ea 5"x10 ea   LTR np np           Hamstring curls  40# 5x10  50#  5x10   30#   3x10 30#x30  30#   4x10  40#   4x10 40#3x10  40#   4x10 40#   4x10   rows Black  3x10 Black  3x10   green   3x10 Green x30  blue   4x10  blue   3x10 Blue x30  blue   30    pball wall squats np np   3x10 30  4x10       Leg press squats 40#  3x10  50#  5x10  60#     30#   2x10 30#x40  40#   4x10 40# 30x                Ther Activity                                       Gait Training                                       Modalities

## 2021-04-28 ENCOUNTER — OFFICE VISIT (OUTPATIENT)
Dept: PHYSICAL THERAPY | Facility: CLINIC | Age: 66
End: 2021-04-28
Payer: COMMERCIAL

## 2021-04-28 DIAGNOSIS — G89.29 CHRONIC BILATERAL LOW BACK PAIN WITHOUT SCIATICA: Primary | ICD-10-CM

## 2021-04-28 DIAGNOSIS — M54.50 CHRONIC BILATERAL LOW BACK PAIN WITHOUT SCIATICA: Primary | ICD-10-CM

## 2021-04-28 PROCEDURE — 97112 NEUROMUSCULAR REEDUCATION: CPT | Performed by: PHYSICAL THERAPIST

## 2021-04-28 PROCEDURE — 97110 THERAPEUTIC EXERCISES: CPT | Performed by: PHYSICAL THERAPIST

## 2021-04-28 NOTE — PROGRESS NOTES
Daily Note     Today's date: 2021  Patient name: Vy Gomez  : 1955  MRN: [id number]  Referring provider: Domingo See PA-C  Dx:   Encounter Diagnosis     ICD-10-CM    1  Chronic bilateral low back pain without sciatica  M54 5     G89 29                   Subjective: pt reports 5/10 pain upon arrival to PT  Reports that she continues to pain the longer she stands  Objective: See treatment diary below  Assessment:increased reps and resistance as listed  Pt reports fatigue but denies pain  Pt continues to reports pain with bed mobility and sit to stand transfers  Plan: Continue per plan of care           Precautions: B TRIP      Manuals 4/21 4/23 4/28 3/19 3/24 3/26 3/31 4/7 4/12 4/16   R QL TPR    8' kl 8' JLW NP kl 8' JLW 8'AF                                          Neuro Re-Ed             TA retraining    5"x30 5"x30 5"x30       Standing press down into pball for core activation             Pt education                                                                 Ther Ex             Bike  10' Upright  10' 10' 10' 10' 10' 10' 8' 10' 10'   Lateral stepping Blue tb  12ft x 8 Blue tb  12ft x8  Blue tb 12ftx8 green  12ft x4 Green 12ftx6 green  12ft x6 green  12ft x6 Green x8 green  12ft x8 GTB 12ftx8   Sit to stand  np  feet on airex  10 nv feet on airex  20       bridges 5" 2x10 5" 2x10 5"x20          Palloff press NP NP           Hip abduction stand 3# 30 ea Stand  3# 30 ea 4#x30 stand  3#  30 ea 3#x30 stand  3#  30 ea stand  3#  30 ea 3#30 stand  3#  30 ea stand  3#  30 ea   Clamshell sidelying 20 ea sidelying 20 ea x30           prone hip extension 20 ea 20 ea x30   20 ea 20 ea x20 20 ea    SKTC NP np  10"x10 ea 10"x10 10"x10 10"x10 ea 10"x10     Pball lumbar flex 3 way 5"x10 5"x10 5"x10ea 5"x10 ea 5"x10ea 5"x10 ea 5"x10 ea 5"x10ea 5"x10 ea 5"x10 ea   LTR np np           Hamstring curls  40# 5x10  50#  5x10 40#5x10  30#   3x10 30#x30  30#   4x10  40#   4x10 40#3x10  40#   4x10 40#   4x10   rows Black  3x10 Black  3x10 Black x30  green   3x10 Green x30  blue   4x10  blue   3x10 Blue x30  blue   30    pball wall squats np np   3x10 30  4x10       Leg press squats 40#  3x10  50#  5x10  50#x30     30#   2x10 30#x40  40#   4x10 40# 30x                Ther Activity                                       Gait Training                                       Modalities

## 2021-04-30 ENCOUNTER — IMMUNIZATIONS (OUTPATIENT)
Dept: FAMILY MEDICINE CLINIC | Facility: HOSPITAL | Age: 66
End: 2021-04-30

## 2021-04-30 DIAGNOSIS — Z23 ENCOUNTER FOR IMMUNIZATION: Primary | ICD-10-CM

## 2021-04-30 PROCEDURE — 0002A SARS-COV-2 / COVID-19 MRNA VACCINE (PFIZER-BIONTECH) 30 MCG: CPT

## 2021-04-30 PROCEDURE — 91300 SARS-COV-2 / COVID-19 MRNA VACCINE (PFIZER-BIONTECH) 30 MCG: CPT

## 2021-05-05 ENCOUNTER — OFFICE VISIT (OUTPATIENT)
Dept: PHYSICAL THERAPY | Facility: CLINIC | Age: 66
End: 2021-05-05
Payer: COMMERCIAL

## 2021-05-05 DIAGNOSIS — G89.29 CHRONIC BILATERAL LOW BACK PAIN WITHOUT SCIATICA: Primary | ICD-10-CM

## 2021-05-05 DIAGNOSIS — M54.50 CHRONIC BILATERAL LOW BACK PAIN WITHOUT SCIATICA: Primary | ICD-10-CM

## 2021-05-05 PROCEDURE — 97110 THERAPEUTIC EXERCISES: CPT

## 2021-05-05 PROCEDURE — 97112 NEUROMUSCULAR REEDUCATION: CPT

## 2021-05-05 NOTE — PROGRESS NOTES
Daily Note     Today's date: 2021  Patient name: Raj Ortega  : 1955  MRN: [id number]  Referring provider: Sunshine Barnett PA-C  Dx:   Encounter Diagnosis     ICD-10-CM    1  Chronic bilateral low back pain without sciatica  M54 5     G89 29                   Subjective: Pt reports feeling good  Objective: See treatment diary below      Assessment: Performed exercise program w/o difficulty or discomfort  Tolerated treatment well  Will monitor  Patient would benefit from continued PT      Plan: Continue per plan of care        Precautions: B TRIP      Manuals 4/21 4/23 4/28 5/5  3/26 3/31 4/7 4/12 4/16   R QL TPR      8' JLW NP kl 8' JLW 8'AF                                          Neuro Re-Ed             TA retraining      5"x30       Standing press down into pball for core activation             Pt education                                                                 Ther Ex             Bike  10' Upright  10' 10' 10'  10' 10' 8' 10' 10'   Lateral stepping Blue tb  12ft x 8 Blue tb  12ft x8  Blue tb 12ftx8 BTB  12ft x8  green  12ft x6 green  12ft x6 Green x8 green  12ft x8 GTB 12ftx8   Sit to stand  np    feet on airex  20       bridges 5" 2x10 5" 2x10 5"x20 5"x20         Palloff press NP NP           Hip abduction stand 3# 30 ea Stand  3# 30 ea 4#x30 4#x30  ea  stand  3#  30 ea stand  3#  30 ea 3#30 stand  3#  30 ea stand  3#  30 ea   Clamshell sidelying 20 ea sidelying 20 ea x30 x30          prone hip extension 20 ea 20 ea x30 x30ea  20 ea 20 ea x20 20 ea    SKTC NP np    10"x10 10"x10 ea 10"x10     Pball lumbar flex 3 way 5"x10 5"x10 5"x10ea  5"x10  ea  5"x10 ea 5"x10 ea 5"x10ea 5"x10 ea 5"x10 ea   LTR np np           Hamstring curls  40# 5x10  50#  5x10 40#5x10  50#   5x10   30#   4x10  40#   4x10 40#3x10  40#   4x10 40#   4x10   rows Black  3x10 Black  3x10 Black x30  black  x30   blue   4x10  blue   3x10 Blue x30  blue   30    pball wall squats np np     4x10       Leg press squats 40#  3x10  50#  5x10  50#x30  50#x30    30#   2x10 30#x40  40#   4x10 40# 30x                Ther Activity                                       Gait Training                                       Modalities

## 2021-05-07 ENCOUNTER — OFFICE VISIT (OUTPATIENT)
Dept: PHYSICAL THERAPY | Facility: CLINIC | Age: 66
End: 2021-05-07
Payer: COMMERCIAL

## 2021-05-07 DIAGNOSIS — G89.29 CHRONIC BILATERAL LOW BACK PAIN WITHOUT SCIATICA: Primary | ICD-10-CM

## 2021-05-07 DIAGNOSIS — M54.50 CHRONIC BILATERAL LOW BACK PAIN WITHOUT SCIATICA: Primary | ICD-10-CM

## 2021-05-07 PROCEDURE — 97110 THERAPEUTIC EXERCISES: CPT

## 2021-05-07 NOTE — PROGRESS NOTES
Daily Note     Today's date: 2021  Patient name: Yady Hsu  : 1955  MRN: [id number]  Referring provider: Hossein Farley PA-C  Dx:   Encounter Diagnosis     ICD-10-CM    1  Chronic bilateral low back pain without sciatica  M54 5     G89 29                   Subjective: Patient denies back pain prior to today's PT treatment  Patient reports taking extra strength Tylenol as needed for back pain  Objective: See treatment diary below  Assessment: Patient is doing well with therapeutic exercise program although fatigue noted when performing additional exercise on the knee extension machine  Patient will benefit from continued PT to increase B/L lower extremity strength and to prevent recurrence of back pain  Plan: Continue treatment as per PT plan of care         Precautions: B TRIP      Manuals    R QL TPR        kl 8' JLW 8'AF                                          Neuro Re-Ed             TA retraining             Standing press down into pball for core activation             Pt education                                                                 Ther Ex             Bike  10' Upright  10' 10' 10' 13'   8' 10' 10'   Lateral stepping Blue tb  12ft x 8 Blue tb  12ft x8  Blue tb 12ftx8 BTB  12ft x8 blue  12ft x10   Green x8 green  12ft x8 GTB 12ftx8   Sit to stand  np           bridges 5" 2x10 5" 2x10 5"x20 5"x20         Palloff press NP NP           Hip abduction stand 3# 30 ea Stand  3# 30 ea 4#x30 4#x30  ea stand  4#  30 ea   3#30 stand  3#  30 ea stand  3#  30 ea   Clamshell sidelying 20 ea sidelying 20 ea x30 x30          prone hip extension 20 ea 20 ea x30 x30ea    x20 20 ea    SKTC NP np      10"x10     Pball lumbar flex 3 way 5"x10 5"x10 5"x10ea  5"x10  ea 5"x10 ea   5"x10ea 5"x10 ea 5"x10 ea   LTR np np           Hamstring curls  40# 5x10  50#  5x10 40#5x10  50#   5x10 50#  3x10   40#3x10  40#   4x10 40#   4x10   Low rows      black   30 rows Black  3x10 Black  3x10 Black x30  black  x30  black    30   Blue x30  blue   30    pball wall squats np np           Leg press squats 40#  3x10  50#  5x10  50#x30  50#x30 50#  3x10   30#x40  40#   4x10 40# 30x   LAQ     30#  2x10        Ther Activity                                       Gait Training                                       Modalities

## 2021-05-12 ENCOUNTER — OFFICE VISIT (OUTPATIENT)
Dept: PHYSICAL THERAPY | Facility: CLINIC | Age: 66
End: 2021-05-12
Payer: COMMERCIAL

## 2021-05-12 DIAGNOSIS — M54.50 CHRONIC BILATERAL LOW BACK PAIN WITHOUT SCIATICA: Primary | ICD-10-CM

## 2021-05-12 DIAGNOSIS — G89.29 CHRONIC BILATERAL LOW BACK PAIN WITHOUT SCIATICA: Primary | ICD-10-CM

## 2021-05-12 PROCEDURE — 97110 THERAPEUTIC EXERCISES: CPT

## 2021-05-12 NOTE — PROGRESS NOTES
Daily Note     Today's date: 2021  Patient name: Christ Villaseñor  : 1955  MRN: [id number]  Referring provider: Bon Way PA-C  Dx:   Encounter Diagnosis     ICD-10-CM    1  Chronic bilateral low back pain without sciatica  M54 5     G89 29                   Subjective: Pt reports she woke with pain this morning  Objective: See treatment diary below      Assessment: Performed exercise program w/o increasing symptoms  Tolerated treatment well  Patient would benefit from continued PT      Plan: Continue per plan of care        Precautions: B TRIP      Manuals    R QL TPR        kl 8' JLW 8'AF                                          Neuro Re-Ed             TA retraining             Standing press down into pball for core activation             Pt education                                                                 Ther Ex             Bike  10' Upright  10' 10' 10' 13' 10'  8' 10' 10'   Lateral stepping Blue tb  12ft x 8 Blue tb  12ft x8  Blue tb 12ftx8 BTB  12ft x8 blue  12ft x10 BTB  12ft   x10  Green x8 green  12ft x8 GTB 12ftx8   Sit to stand  np           bridges 5" 2x10 5" 2x10 5"x20 5"x20         Palloff press NP NP           Hip abduction stand 3# 30 ea Stand  3# 30 ea 4#x30 4#x30  ea stand  4#  30 ea Stand  4#  30 ea  3#30 stand  3#  30 ea stand  3#  30 ea   Clamshell sidelying 20 ea sidelying 20 ea x30 x30          prone hip extension 20 ea 20 ea x30 x30ea    x20 20 ea    SKTC NP np      10"x10     Pball lumbar flex 3 way 5"x10 5"x10 5"x10ea  5"x10  ea 5"x10 ea 5"x10  ea  5"x10ea 5"x10 ea 5"x10 ea   LTR np np           Hamstring curls  40# 5x10  50#  5x10 40#5x10  50#   5x10 50#  3x10 50#  3x10  40#3x10  40#   4x10 40#   4x10   Low rows      black   30  Black   30       rows Black  3x10 Black  3x10 Black x30  black  x30  black    30  Black   30  Blue x30  blue   30    pball wall squats np np           Leg press squats 40#  3x10  50#  5x10 50#x30  50#x30 50#  3x10 50#  3x10  30#x40  40#   4x10 40# 30x   LAQ     30#  2x10 30#  2x10       Ther Activity                                       Gait Training                                       Modalities

## 2021-05-14 ENCOUNTER — OFFICE VISIT (OUTPATIENT)
Dept: PHYSICAL THERAPY | Facility: CLINIC | Age: 66
End: 2021-05-14
Payer: COMMERCIAL

## 2021-05-14 DIAGNOSIS — G89.29 CHRONIC BILATERAL LOW BACK PAIN WITHOUT SCIATICA: Primary | ICD-10-CM

## 2021-05-14 DIAGNOSIS — M54.50 CHRONIC BILATERAL LOW BACK PAIN WITHOUT SCIATICA: Primary | ICD-10-CM

## 2021-05-14 PROCEDURE — 97530 THERAPEUTIC ACTIVITIES: CPT

## 2021-05-14 PROCEDURE — 97110 THERAPEUTIC EXERCISES: CPT

## 2021-05-14 NOTE — PROGRESS NOTES
Daily Note     Today's date: 2021  Patient name: Griselda Calixto  : 1955  MRN: [id number]  Referring provider: Jackalyn Brittle, PA-C  Dx:   Encounter Diagnosis     ICD-10-CM    1  Chronic bilateral low back pain without sciatica  M54 5     G89 29                   Subjective: Patient reports she is feeling okay at this time  Objective: See treatment diary below      Assessment: Tolerated treatment well  Patient would benefit from continued PT      Plan: Continue per plan of care        Precautions: B TRIP      Manuals       R QL TPR                                                    Neuro Re-Ed             TA retraining             Standing press down into pball for core activation             Pt education                                                                 Ther Ex             Bike  10' Upright  10' 10' 10' 13' 10' 10'      Lateral stepping Blue tb  12ft x 8 Blue tb  12ft x8  Blue tb 12ftx8 BTB  12ft x8 blue  12ft x10 BTB  12ft   x10 BTB 12ft x10      Sit to stand  np           bridges 5" 2x10 5" 2x10 5"x20 5"x20         Palloff press NP NP           Hip abduction stand 3# 30 ea Stand  3# 30 ea 4#x30 4#x30  ea stand  4#  30 ea Stand  4#  30 ea Stand 4# 30 ea      Clamshell sidelying 20 ea sidelying 20 ea x30 x30          prone hip extension 20 ea 20 ea x30 x30ea         SKTC NP np           Pball lumbar flex 3 way 5"x10 5"x10 5"x10ea  5"x10  ea 5"x10 ea 5"x10  ea 5"x10 ea      LTR np np           Hamstring curls  40# 5x10  50#  5x10 40#5x10  50#   5x10 50#  3x10 50#  3x10 50# x30      Low rows      black   30  Black   30 Black x30      rows Black  3x10 Black  3x10 Black x30  black  x30  black    30  Black   30 Black x30      pball wall squats np np           Leg press squats 40#  3x10  50#  5x10  50#x30  50#x30 50#  3x10 50#  3x10 50# x30      LAQ     30#  2x10 30#  2x10 30# x20      Ther Activity                                       Gait Training Modalities

## 2021-05-19 ENCOUNTER — OFFICE VISIT (OUTPATIENT)
Dept: PHYSICAL THERAPY | Facility: CLINIC | Age: 66
End: 2021-05-19
Payer: COMMERCIAL

## 2021-05-19 DIAGNOSIS — M54.50 CHRONIC BILATERAL LOW BACK PAIN WITHOUT SCIATICA: Primary | ICD-10-CM

## 2021-05-19 DIAGNOSIS — G89.29 CHRONIC BILATERAL LOW BACK PAIN WITHOUT SCIATICA: Primary | ICD-10-CM

## 2021-05-19 PROCEDURE — 97110 THERAPEUTIC EXERCISES: CPT | Performed by: PHYSICAL THERAPIST

## 2021-05-19 PROCEDURE — 97530 THERAPEUTIC ACTIVITIES: CPT | Performed by: PHYSICAL THERAPIST

## 2021-05-19 NOTE — PROGRESS NOTES
Daily Note     Today's date: 2021  Patient name: Rafi Flannery  : 1955  MRN: [id number]  Referring provider: Cornell Wilson PA-C  Dx:   Encounter Diagnosis     ICD-10-CM    1  Chronic bilateral low back pain without sciatica  M54 5     G89 29                   Subjective: pt reports that she was having increased pain over the weekend but is unable to identify a trigger for this  Reports 5/10 pain upon arrival to PT  Objective: See treatment diary below      Assessment: increased reps and resistance with therex as listed with no complaints other then fatigue  Continue to progress as janet Nv  Plan: Continue per plan of care        Precautions: B TRIP      Manuals      R QL TPR                                                    Neuro Re-Ed             TA retraining             Standing press down into pball for core activation             Pt education                                                                 Ther Ex             Bike  10' Upright  10' 10' 10' 13' 10' 10' 10'     Lateral stepping Blue tb  12ft x 8 Blue tb  12ft x8  Blue tb 12ftx8 BTB  12ft x8 blue  12ft x10 BTB  12ft   x10 BTB 12ft x10 Black x10     Sit to stand  np           bridges 5" 2x10 5" 2x10 5"x20 5"x20         Palloff press NP NP           Hip abduction stand 3# 30 ea Stand  3# 30 ea 4#x30 4#x30  ea stand  4#  30 ea Stand  4#  30 ea Stand 4# 30 ea 5#x30     Clamshell sidelying 20 ea sidelying 20 ea x30 x30          prone hip extension 20 ea 20 ea x30 x30ea         SKTC NP np           Pball lumbar flex 3 way 5"x10 5"x10 5"x10ea  5"x10  ea 5"x10 ea 5"x10  ea 5"x10 ea 5"x10     LTR np np           Hamstring curls  40# 5x10  50#  5x10 40#5x10  50#   5x10 50#  3x10 50#  3x10 50# x30 50#x30     Low rows      black   30  Black   30 Black x30 Black x30     rows Black  3x10 Black  3x10 Black x30  black  x30  black    30  Black   30 Black x30 Black x30     pball wall squats np np Leg press squats 40#  3x10  50#  5x10  50#x30  50#x30 50#  3x10 50#  3x10 50# x30 50#x40     LAQ     30#  2x10 30#  2x10 30# x20 50#x20     Ther Activity                                       Gait Training                                       Modalities

## 2021-05-21 ENCOUNTER — OFFICE VISIT (OUTPATIENT)
Dept: PHYSICAL THERAPY | Facility: CLINIC | Age: 66
End: 2021-05-21
Payer: COMMERCIAL

## 2021-05-21 DIAGNOSIS — M54.50 CHRONIC BILATERAL LOW BACK PAIN WITHOUT SCIATICA: Primary | ICD-10-CM

## 2021-05-21 DIAGNOSIS — G89.29 CHRONIC BILATERAL LOW BACK PAIN WITHOUT SCIATICA: Primary | ICD-10-CM

## 2021-05-21 PROCEDURE — 97110 THERAPEUTIC EXERCISES: CPT

## 2021-05-21 NOTE — PROGRESS NOTES
Daily Note     Today's date: 2021  Patient name: Reynaldo Zabala  : 1955  MRN: [id number]  Referring provider: Makayla Nolan PA-C  Dx:   Encounter Diagnosis     ICD-10-CM    1  Chronic bilateral low back pain without sciatica  M54 5     G89 29                   Subjective: Patient reports her back is feeling "okay" prior to today's PT treatment  Patient reports taking "a couple Tylenol" this morning "because it started acting up "  Patient is concerned about stopping skilled PT due to fear of regressing  Objective: See treatment diary below  Assessment: Patient is doing well with therapeutic exercise program   Patient would benefit from continued PT to prevent a decline in function  Plan: Continue treatment as per PT plan of care         Precautions: B TRIP      Manuals     R QL TPR                                                    Neuro Re-Ed             TA retraining             Standing press down into pball for core activation             Pt education                                                                 Ther Ex             Bike  10' Upright  10' 10' 10' 13' 10' 10' 10' 10'    Lateral stepping Blue tb  12ft x 8 Blue tb  12ft x8  Blue tb 12ftx8 BTB  12ft x8 blue  12ft x10 BTB  12ft   x10 BTB 12ft x10 Black x10 black  15ft 4    Sit to stand  np           bridges 5" 2x10 5" 2x10 5"x20 5"x20         Palloff press NP NP           Hip abduction stand 3# 30 ea Stand  3# 30 ea 4#x30 4#x30  ea stand  4#  30 ea Stand  4#  30 ea Stand 4# 30 ea 5#x30 4#  30 ea    Clamshell sidelying 20 ea sidelying 20 ea x30 x30          prone hip extension 20 ea 20 ea x30 x30ea         SKTC NP np           Pball lumbar flex 3 way 5"x10 5"x10 5"x10ea  5"x10  ea 5"x10 ea 5"x10  ea 5"x10 ea 5"x10 5"x10 ea    LTR np np           Hamstring curls  40# 5x10  50#  5x10 40#5x10  50#   5x10 50#  3x10 50#  3x10 50# x30 50#x30  50#   3x10    Low rows      black   30 Black   30 Black x30 Black x30 black x30    rows Black  3x10 Black  3x10 Black x30  black  x30  black    30  Black   30 Black x30 Black x30 black x30    pball wall squats np np           Leg press squats 40#  3x10  50#  5x10  50#x30  50#x30 50#  3x10 50#  3x10 50# x30 50#x40  60#   4x10    LAQ     30#  2x10 30#  2x10 30# x20 50#x20  40#   3x10                 Ther Activity                                       Gait Training                                       Modalities

## 2021-05-25 ENCOUNTER — OFFICE VISIT (OUTPATIENT)
Dept: FAMILY MEDICINE CLINIC | Facility: CLINIC | Age: 66
End: 2021-05-25
Payer: COMMERCIAL

## 2021-05-25 VITALS
SYSTOLIC BLOOD PRESSURE: 122 MMHG | DIASTOLIC BLOOD PRESSURE: 72 MMHG | WEIGHT: 195.2 LBS | HEIGHT: 61 IN | BODY MASS INDEX: 36.85 KG/M2 | HEART RATE: 80 BPM | RESPIRATION RATE: 16 BRPM

## 2021-05-25 DIAGNOSIS — G89.29 CHRONIC BILATERAL THORACIC BACK PAIN: Primary | ICD-10-CM

## 2021-05-25 DIAGNOSIS — M54.6 CHRONIC BILATERAL THORACIC BACK PAIN: Primary | ICD-10-CM

## 2021-05-25 PROCEDURE — 3008F BODY MASS INDEX DOCD: CPT | Performed by: PHYSICIAN ASSISTANT

## 2021-05-25 PROCEDURE — 1036F TOBACCO NON-USER: CPT | Performed by: PHYSICIAN ASSISTANT

## 2021-05-25 PROCEDURE — 99213 OFFICE O/P EST LOW 20 MIN: CPT | Performed by: PHYSICIAN ASSISTANT

## 2021-05-25 NOTE — PROGRESS NOTES
Assessment/Plan:    1  Chronic bilateral thoracic back pain    - will order MRI as this pain has been chronic and has been in PT for 2 months but still voices her concern about improvement per PT notes  Taking Tylenol daily still   - MRI thoracic spine wo contrast; Future    2  Obesity    - will refer to Weight Watcher, after reviewing supplements advised to stop taking as they are packed with caffeine  Advised if the [employer] will discount the right earlier with a note from her PCP to let us know and we can try and do this for her  F/u as needed    Subjective:   Chief Complaint   Patient presents with   Darryn greene diet plan      Patient ID: Jose Robins is a 60 y o  female  Patient here in follow up has been in PT for 2 months for thoracic spine with minimal improvement, taking tylenol daily due to discomfort  Cannot afford gym right now  Does not feel she is improving like she would like  Can feel the tightness moving up her back off and on all day  Wants to lose weight  Bought different supplements to see if they would work  Not exercising, cannot afford [employer] until she turns 66  Tried cutting out carbs and dairy but eating ice cream every night         The following portions of the patient's history were reviewed and updated as appropriate: allergies, current medications, past family history, past medical history, past social history, past surgical history and problem list     Past Medical History:   Diagnosis Date    Blepharitis of eyelid of left eye      Past Surgical History:   Procedure Laterality Date    DENTAL SURGERY      [place] teeth extraction    HIP ARTHROPLASTY      Right    HIP ARTHROSCOPY      Left    TUBAL LIGATION       Family History   Problem Relation Age of Onset    Kidney cancer Mother     Lung cancer Father     No Known Problems Sister     Mental illness Cousin     No Known Problems Maternal Grandmother     No Known Problems Maternal Grandfather     No Known Problems Paternal Grandmother     No Known Problems Paternal Grandfather     Breast cancer Maternal Aunt         66's    No Known Problems Maternal Aunt     No Known Problems Maternal Aunt     No Known Problems Maternal Aunt     No Known Problems Maternal Aunt     No Known Problems Maternal Aunt     No Known Problems Paternal Aunt     Substance Abuse Neg Hx     Alcohol abuse Neg Hx      Social History     Socioeconomic History    Marital status: Single     Spouse name: Not on file    Number of children: Not on file    Years of education: Not on file    Highest education level: Not on file   Occupational History    Not on file   Social Needs    Financial resource strain: Not on file    Food insecurity     Worry: Not on file     Inability: Not on file    Transportation needs     Medical: Not on file     Non-medical: Not on file   Tobacco Use    Smoking status: Never Smoker    Smokeless tobacco: Never Used   Substance and Sexual Activity    Alcohol use: No    Drug use: No    Sexual activity: Not on file   Lifestyle    Physical activity     Days per week: Not on file     Minutes per session: Not on file    Stress: Not on file   Relationships    Social connections     Talks on phone: Not on file     Gets together: Not on file     Attends Confucianist service: Not on file     Active member of club or organization: Not on file     Attends meetings of clubs or organizations: Not on file     Relationship status: Not on file    Intimate partner violence     Fear of current or ex partner: Not on file     Emotionally abused: Not on file     Physically abused: Not on file     Forced sexual activity: Not on file   Other Topics Concern    Not on file   Social History Narrative    Always uses a seatbelt    Caffeine use- 1 cup of coffee per day    Has smoke detectors       Current Outpatient Medications:     ascorbic acid (VITAMIN C) 500 mg tablet, Take 500 mg by mouth daily, Disp: , Rfl:     Calcium Citrate-Vitamin D (CALCIUM CITRATE CHEWY BITE PO), Take by mouth, Disp: , Rfl:     Cholecalciferol (VITAMIN D-3) 1000 units CAPS, Take by mouth daily, Disp: , Rfl:     Coenzyme Q10 (Co Q-10) 100 MG CHEW, Chew, Disp: , Rfl:     [employer] GLUCOSAMINE-CHONDROITIN PO, Take by mouth, Disp: , Rfl:     GINKGO BILOBA PO, Take by mouth, Disp: , Rfl:     loratadine (CLARITIN) 10 mg tablet, Take 1 tablet (10 mg total) by mouth daily, Disp: 30 tablet, Rfl: 0    Misc Natural Products (DAILY HERBS IMMUNE DEFENSE PO), Take by mouth, Disp: , Rfl:     Multiple Minerals-Vitamins (CALCIUM & VIT D3 BONE HEALTH PO), Take by mouth, Disp: , Rfl:     multivitamin (THERAGRAN) TABS, Take 1 tablet by mouth daily, Disp: , Rfl:     Omega-3 Fatty Acids (FISH OIL) 500 MG CAPS, Take by mouth 3 (three) times a day, Disp: , Rfl:     Review of Systems          Objective:    Vitals:    05/25/21 1336   BP: 122/72   BP Location: Left arm   Patient Position: Sitting   Cuff Size: Standard   Pulse: 80   Resp: 16   Weight: 88 5 kg (195 lb 3 2 oz)   Height: 5' 0 75" (1 543 m)        Physical Exam  Constitutional:       Appearance: Normal appearance  She is obese  Cardiovascular:      Rate and Rhythm: Normal rate and regular rhythm  Pulses: Normal pulses  Heart sounds: Normal heart sounds  Pulmonary:      Effort: Pulmonary effort is normal       Breath sounds: Normal breath sounds  Musculoskeletal:      Comments: Thoracic spine note tender, paraspinal muscles with spasm   Neurological:      General: No focal deficit present  Mental Status: She is alert and oriented to person, place, and time  Psychiatric:         Mood and Affect: Mood normal          Behavior: Behavior normal          Thought Content:  Thought content normal          Judgment: Judgment normal

## 2021-05-26 ENCOUNTER — OFFICE VISIT (OUTPATIENT)
Dept: PHYSICAL THERAPY | Facility: CLINIC | Age: 66
End: 2021-05-26
Payer: COMMERCIAL

## 2021-05-26 DIAGNOSIS — G89.29 CHRONIC BILATERAL LOW BACK PAIN WITHOUT SCIATICA: Primary | ICD-10-CM

## 2021-05-26 DIAGNOSIS — M54.50 CHRONIC BILATERAL LOW BACK PAIN WITHOUT SCIATICA: Primary | ICD-10-CM

## 2021-05-26 PROCEDURE — 97110 THERAPEUTIC EXERCISES: CPT

## 2021-05-26 NOTE — PROGRESS NOTES
Daily Note     Today's date: 2021  Patient name: Elver Singh  : 1955  MRN: [id number]  Referring provider: Edgard Leonard PA-C  Dx:   Encounter Diagnosis     ICD-10-CM    1  Chronic bilateral low back pain without sciatica  M54 5     G89 29                   Subjective: Patient states that she had moderate pain in her lower back while walking yesterday  Patient states that sitting relieves LBP  She states that she is setting up an appointment for xrays of her lower back - after reviewing note from PCP, it appears patient is being referred for an MRI of her thoracic spine  Objective: See treatment diary below  Assessment: Upon standing, lower back pain noted after performing pball lumbar flexion stretching - this resolves quickly  Patient had mild decreased endurance while performing TE today  Patient would benefit from continued PT to increase her overall strength and endurance to prevent a decline in function  Plan: Continue per plan of care           Precautions: B TRIP      Manuals    R QL TPR                                                    Neuro Re-Ed             TA retraining             Standing press down into pball for core activation             Pt education                                                                 Ther Ex             Bike  10' Upright  10' 10' 10' 13' 10' 10' 10' 10' 10     Lateral stepping Blue tb  12ft x 8 Blue tb  12ft x8  Blue tb 12ftx8 BTB  12ft x8 blue  12ft x10 BTB  12ft   x10 BTB 12ft x10 Black x10 black  15ft 4 Black  [id number]   Sit to stand  np        NP   bridges 5" 2x10 5" 2x10 5"x20 5"x20      NP   Palloff press NP NP        NP   Hip abduction stand 3# 30 ea Stand  3# 30 ea 4#x30 4#x30  ea stand  4#  30 ea Stand  4#  30 ea Stand 4# 30 ea 5#x30 4#  30 ea Stand  5#x30ea   Clamshell sidelying 20 ea sidelying 20 ea x30 x30      NP    prone hip extension 20 ea 20 ea x30 x30ea      NP   SKTC NP np        NP   Pball lumbar flex 3 way 5"x10 5"x10 5"x10ea  5"x10  ea 5"x10 ea 5"x10  ea 5"x10 ea 5"x10 5"x10 ea 10"x10ea   LTR np np           Hamstring curls  40# 5x10  50#  5x10 40#5x10  50#   5x10 50#  3x10 50#  3x10 50# x30 50#x30  50#   3x10  50#   3x10   Low rows      black   30  Black   30 Black x30 Black x30 black x30 black x30   rows Black  3x10 Black  3x10 Black x30  black  x30  black    30  Black   30 Black x30 Black x30 black x30 black x30   pball wall squats np np        NP   Leg press squats 40#  3x10  50#  5x10  50#x30  50#x30 50#  3x10 50#  3x10 50# x30 50#x40  60#   4x10  60#   3x10   LAQ     30#  2x10 30#  2x10 30# x20 50#x20  40#   3x10  40#   3x10                Ther Activity                                       Gait Training                                       Modalities

## 2021-05-28 ENCOUNTER — OFFICE VISIT (OUTPATIENT)
Dept: PHYSICAL THERAPY | Facility: CLINIC | Age: 66
End: 2021-05-28
Payer: COMMERCIAL

## 2021-05-28 DIAGNOSIS — G89.29 CHRONIC BILATERAL LOW BACK PAIN WITHOUT SCIATICA: Primary | ICD-10-CM

## 2021-05-28 DIAGNOSIS — M54.50 CHRONIC BILATERAL LOW BACK PAIN WITHOUT SCIATICA: Primary | ICD-10-CM

## 2021-05-28 PROCEDURE — 97110 THERAPEUTIC EXERCISES: CPT

## 2021-05-28 NOTE — PROGRESS NOTES
Daily Note     Today's date: 2021  Patient name: Savi Galindo  : 1955  MRN: [id number]  Referring provider: Yesy Dillard PA-C  Dx:   Encounter Diagnosis     ICD-10-CM    1  Chronic bilateral low back pain without sciatica  M54 5     G89 29                   Subjective: Patient reports being very sore today from overdoing it running errands yesterday  Objective: See treatment diary below      Assessment: Patient reports decreased pain and increased mobility after tx session  Plan: Continue per plan of care        Precautions: B TRIP      Manuals    R QL TPR                                        Neuro Re-Ed          TA retraining          Standing press down into pball for core activation          Pt education                                                  Ther Ex          Bike 13' 10' 10' 10' 10' 10   10'   Lateral stepping blue  12ft x10 BTB  12ft   x10 BTB 12ft x10 Black x10 black  15ft 4 Black  [id number] Black 12'x10   Sit to stand      NP    bridges      NP    Palloff press      NP    Hip abduction stand  4#  30 ea Stand  4#  30 ea Stand 4# 30 ea 5#x30 4#  30 ea Stand  5#x30ea Stand 5# x30ea   Clamshell      NP     prone hip extension      NP    SKTC      NP    Pball lumbar flex 3 way 5"x10 ea 5"x10  ea 5"x10 ea 5"x10 5"x10 ea 10"x10ea 10"x10ea   LTR          Hamstring curls  50#  3x10 50#  3x10 50# x30 50#x30  50#   3x10  50#   3x10 50# 3x10   Low rows  black   30  Black   30 Black x30 Black x30 black x30 black x30 Black x30   rows  black    30  Black   30 Black x30 Black x30 black x30 black x30 Black x30   pball wall squats      NP    Leg press squats 50#  3x10 50#  3x10 50# x30 50#x40  60#   4x10  60#   3x10 60# 3x10   LAQ 30#  2x10 30#  2x10 30# x20 50#x20  40#   3x10  40#   3x10 40# 3x10             Ther Activity                              Gait Training                              Modalities

## 2021-06-01 ENCOUNTER — OFFICE VISIT (OUTPATIENT)
Dept: PHYSICAL THERAPY | Facility: CLINIC | Age: 66
End: 2021-06-01
Payer: COMMERCIAL

## 2021-06-01 DIAGNOSIS — M54.50 CHRONIC BILATERAL LOW BACK PAIN WITHOUT SCIATICA: Primary | ICD-10-CM

## 2021-06-01 DIAGNOSIS — G89.29 CHRONIC BILATERAL LOW BACK PAIN WITHOUT SCIATICA: Primary | ICD-10-CM

## 2021-06-01 PROCEDURE — 97110 THERAPEUTIC EXERCISES: CPT

## 2021-06-01 NOTE — PROGRESS NOTES
Daily Note     Today's date: 2021  Patient name: Nelda Bamberger  : 1955  MRN: [id number]  Referring provider: Naheed Sanchez PA-C  Dx:   Encounter Diagnosis     ICD-10-CM    1  Chronic bilateral low back pain without sciatica  M54 5     G89 29                   Subjective: Patient denies any significant back pain prior to today's PT treatment  Objective: See treatment diary below  Assessment: Additional standing hip extension is tolerated well without complaints  Patient would benefit from continued PT to prevent a decline in function  Plan: Continue treatment as per PT plan of care         Precautions: B TRIP      Manuals    R QL TPR                                            Neuro Re-Ed           TA retraining           Standing press down into pball for core activation                                                       Ther Ex           Bike 13' 10' 10' 10' 10' 10   10' 10'   Lateral stepping blue  12ft x10 BTB  12ft   x10 BTB 12ft x10 Black x10 black  15ft 4 Black  [id number] Black 12'x10 black  12ft x10   Sit to stand      NP     bridges      NP     Palloff press      NP     Hip abduction stand  4#  30 ea Stand  4#  30 ea Stand 4# 30 ea 5#x30 4#  30 ea Stand  5#x30ea Stand 5# x30ea stand  abd, ext  5#  30 ea   Clamshell      NP      prone hip extension      NP     SKTC      NP     Pball lumbar flex 3 way 5"x10 ea 5"x10  ea 5"x10 ea 5"x10 5"x10 ea 10"x10ea 10"x10ea 10"x10 ea   LTR           Hamstring curls  50#  3x10 50#  3x10 50# x30 50#x30  50#   3x10  50#   3x10 50# 3x10  50#   3x10   Low rows  black   30  Black   30 Black x30 Black x30 black x30 black x30 Black x30  black   3x10   rows  black    30  Black   30 Black x30 Black x30 black x30 black x30 Black x30  black   3x10   pball wall squats      NP     Leg press squats 50#  3x10 50#  3x10 50# x30 50#x40  60#   4x10  60#   3x10 60# 3x10  60#   3x10   LAQ 30#  2x10 30#  2x10 30# x20 50#x20  40# 3x10  40#   3x10 40# 3x10  40#   3x10              Ther Activity                                 Gait Training                                 Modalities

## 2021-06-04 ENCOUNTER — OFFICE VISIT (OUTPATIENT)
Dept: PHYSICAL THERAPY | Facility: CLINIC | Age: 66
End: 2021-06-04
Payer: COMMERCIAL

## 2021-06-04 DIAGNOSIS — M54.50 CHRONIC BILATERAL LOW BACK PAIN WITHOUT SCIATICA: Primary | ICD-10-CM

## 2021-06-04 DIAGNOSIS — G89.29 CHRONIC BILATERAL LOW BACK PAIN WITHOUT SCIATICA: Primary | ICD-10-CM

## 2021-06-04 PROCEDURE — 97112 NEUROMUSCULAR REEDUCATION: CPT | Performed by: PHYSICAL THERAPIST

## 2021-06-04 PROCEDURE — 97110 THERAPEUTIC EXERCISES: CPT | Performed by: PHYSICAL THERAPIST

## 2021-06-04 NOTE — PROGRESS NOTES
Daily Note     Today's date: 2021  Patient name: Yady Hsu  : 1955  MRN: [id number]  Referring provider: Hossein Farley PA-C  Dx:   Encounter Diagnosis     ICD-10-CM    1  Chronic bilateral low back pain without sciatica  M54 5     G89 29                   Subjective: Pt reports min pain upon arrival to PT  Objective: See treatment diary below  Assessment: pt requires verbal cues to avoid hip rotation during SLR, denies pain t/o therex  Continue to progress all therex as janet Nv  Plan: Continue treatment as per PT plan of care         Precautions: B TRIP      Manuals    R QL TPR                                            Neuro Re-Ed           TA retraining           Standing press down into pball for core activation                                                       Ther Ex           Bike 10' 10' 10' 10' 10' 10   10' 10'   Lateral stepping Black  12ft x10 BTB  12ft   x10 BTB 12ft x10 Black x10 black  15ft 4 Black  [id number] Black 12'x10 black  12ft x10   Sit to stand      NP     bridges      NP     Palloff press      NP     Hip abduction stand  5#  30 ea Stand  4#  30 ea Stand 4# 30 ea 5#x30 4#  30 ea Stand  5#x30ea Stand 5# x30ea stand  abd, ext  5#  30 ea   Clamshell      NP      prone hip extension      NP     SKTC      NP     Pball lumbar flex 3 way 5"x10 ea 5"x10  ea 5"x10 ea 5"x10 5"x10 ea 10"x10ea 10"x10ea 10"x10 ea   LTR           Hamstring curls  50#  3x10 50#  3x10 50# x30 50#x30  50#   3x10  50#   3x10 50# 3x10  50#   3x10   Low rows  black   30  Black   30 Black x30 Black x30 black x30 black x30 Black x30  black   3x10   rows  black    30  Black   30 Black x30 Black x30 black x30 black x30 Black x30  black   3x10   pball wall squats      NP     Leg press squats 50#  3x10 50#  3x10 50# x30 50#x40  60#   4x10  60#   3x10 60# 3x10  60#   3x10   LAQ 30#  2x10 30#  2x10 30# x20 50#x20  40#   3x10  40#   3x10 40# 3x10  40#   3x10   Lateral step up and over nv          Hip hike nv                                Ther Activity                                 Gait Training                                 Modalities

## 2021-06-09 ENCOUNTER — APPOINTMENT (OUTPATIENT)
Dept: PHYSICAL THERAPY | Facility: CLINIC | Age: 66
End: 2021-06-09
Payer: COMMERCIAL

## 2021-06-11 ENCOUNTER — OFFICE VISIT (OUTPATIENT)
Dept: PHYSICAL THERAPY | Facility: CLINIC | Age: 66
End: 2021-06-11
Payer: COMMERCIAL

## 2021-06-11 DIAGNOSIS — G89.29 CHRONIC BILATERAL LOW BACK PAIN WITHOUT SCIATICA: Primary | ICD-10-CM

## 2021-06-11 DIAGNOSIS — M54.50 CHRONIC BILATERAL LOW BACK PAIN WITHOUT SCIATICA: Primary | ICD-10-CM

## 2021-06-11 PROCEDURE — 97112 NEUROMUSCULAR REEDUCATION: CPT | Performed by: PHYSICAL THERAPIST

## 2021-06-11 PROCEDURE — 97110 THERAPEUTIC EXERCISES: CPT | Performed by: PHYSICAL THERAPIST

## 2021-06-11 NOTE — PROGRESS NOTES
Daily Note     Today's date: 2021  Patient name: Rafi Flannery  : 1955  MRN: [id number]  Referring provider: Cornell Wilson PA-C  Dx:   Encounter Diagnosis     ICD-10-CM    1  Chronic bilateral low back pain without sciatica  M54 5     G89 29                   Subjective: Pt reports that she is having increased pain currently secondary to lifting a case of water bottles this morning      Objective: See treatment diary below  Assessment: Modified therex secondary to increased pain levels, ended session with MHP post-tx and pt reports slight pain reduction  Plan to resume full program NV  Plan: Continue treatment as per PT plan of care         Precautions: B TRIP      Manuals    R QL TPR                                            Neuro Re-Ed           TA retraining           Standing press down into pball for core activation                                                       Ther Ex           Bike 10' 10' 10' 10' 10' 10   10' 10'   Lateral stepping Black  12ft x10  BTB 12ft x10 Black x10 black  15ft 4 Black  [id number] Black 12'x10 black  12ft x10   Sit to stand      NP     bridges      NP     Palloff press      NP     Hip abduction stand  5#  30 ea  Stand 4# 30 ea 5#x30 4#  30 ea Stand  5#x30ea Stand 5# x30ea stand  abd, ext  5#  30 ea   Clamshell      NP      prone hip extension      NP     SKTC      NP     Pball lumbar flex 3 way 5"x10 ea 5"x10  ea 5"x10 ea 5"x10 5"x10 ea 10"x10ea 10"x10ea 10"x10 ea   LTR           Hamstring curls  50#  3x10 50#  3x10 50# x30 50#x30  50#   3x10  50#   3x10 50# 3x10  50#   3x10   Low rows  black   30  Black   30 Black x30 Black x30 black x30 black x30 Black x30  black   3x10   rows  black    30  Black   30 Black x30 Black x30 black x30 black x30 Black x30  black   3x10   pball wall squats      NP     Leg press squats 50#  3x10 50#  3x10 50# x30 50#x40  60#   4x10  60#   3x10 60# 3x10  60#   3x10   LAQ 30#  2x10 30#  2x10 30# x20 50#x20  40#   3x10  40#   3x10 40# 3x10  40#   3x10   Lateral step up and over nv nv         Hip hike nv nv                               Ther Activity                                 Gait Training                                 Modalities           [facility]  10' pos-tx seated

## 2021-06-14 ENCOUNTER — HOSPITAL ENCOUNTER (OUTPATIENT)
Dept: MRI IMAGING | Facility: HOSPITAL | Age: 66
Discharge: HOME | End: 2021-06-14
Payer: COMMERCIAL

## 2021-06-14 DIAGNOSIS — G89.29 CHRONIC BILATERAL THORACIC BACK PAIN: ICD-10-CM

## 2021-06-14 DIAGNOSIS — M54.6 CHRONIC BILATERAL THORACIC BACK PAIN: ICD-10-CM

## 2021-06-14 PROCEDURE — G1004 CDSM NDSC: HCPCS

## 2021-06-14 PROCEDURE — 72146 MRI CHEST SPINE W/O DYE: CPT

## 2021-06-15 ENCOUNTER — TELEPHONE (OUTPATIENT)
Dept: FAMILY MEDICINE CLINIC | Facility: CLINIC | Age: 66
End: 2021-06-15

## 2021-06-16 ENCOUNTER — APPOINTMENT (OUTPATIENT)
Dept: PHYSICAL THERAPY | Facility: CLINIC | Age: 66
End: 2021-06-16
Payer: COMMERCIAL

## 2021-06-17 ENCOUNTER — APPOINTMENT (OUTPATIENT)
Dept: PHYSICAL THERAPY | Facility: CLINIC | Age: 66
End: 2021-06-17
Payer: COMMERCIAL

## 2021-06-18 ENCOUNTER — APPOINTMENT (OUTPATIENT)
Dept: PHYSICAL THERAPY | Facility: CLINIC | Age: 66
End: 2021-06-18
Payer: COMMERCIAL

## 2021-06-18 ENCOUNTER — TELEPHONE (OUTPATIENT)
Dept: FAMILY MEDICINE CLINIC | Facility: CLINIC | Age: 66
End: 2021-06-18

## 2021-06-18 NOTE — TELEPHONE ENCOUNTER
----- Message from Rola Mcrae PA-C sent at 6/17/2021  9:12 PM EDT -----  Please let patient know the Mri shows degenerative changes, she would benefit from PT

## 2021-06-23 ENCOUNTER — APPOINTMENT (OUTPATIENT)
Dept: PHYSICAL THERAPY | Facility: CLINIC | Age: 66
End: 2021-06-23
Payer: COMMERCIAL

## 2021-06-25 ENCOUNTER — APPOINTMENT (OUTPATIENT)
Dept: PHYSICAL THERAPY | Facility: CLINIC | Age: 66
End: 2021-06-25
Payer: COMMERCIAL

## 2021-06-30 ENCOUNTER — APPOINTMENT (OUTPATIENT)
Dept: PHYSICAL THERAPY | Facility: CLINIC | Age: 66
End: 2021-06-30
Payer: COMMERCIAL

## 2021-07-02 ENCOUNTER — APPOINTMENT (OUTPATIENT)
Dept: PHYSICAL THERAPY | Facility: CLINIC | Age: 66
End: 2021-07-02
Payer: COMMERCIAL

## 2021-07-07 ENCOUNTER — APPOINTMENT (OUTPATIENT)
Dept: PHYSICAL THERAPY | Facility: CLINIC | Age: 66
End: 2021-07-07
Payer: COMMERCIAL

## 2021-07-09 ENCOUNTER — APPOINTMENT (OUTPATIENT)
Dept: PHYSICAL THERAPY | Facility: CLINIC | Age: 66
End: 2021-07-09
Payer: COMMERCIAL

## 2021-07-14 ENCOUNTER — APPOINTMENT (OUTPATIENT)
Dept: PHYSICAL THERAPY | Facility: CLINIC | Age: 66
End: 2021-07-14
Payer: COMMERCIAL

## 2021-07-16 ENCOUNTER — APPOINTMENT (OUTPATIENT)
Dept: PHYSICAL THERAPY | Facility: CLINIC | Age: 66
End: 2021-07-16
Payer: COMMERCIAL

## 2021-07-21 ENCOUNTER — EVALUATION (OUTPATIENT)
Dept: PHYSICAL THERAPY | Facility: CLINIC | Age: 66
End: 2021-07-21
Payer: COMMERCIAL

## 2021-07-21 DIAGNOSIS — M54.50 BILATERAL LOW BACK PAIN, UNSPECIFIED CHRONICITY, UNSPECIFIED WHETHER SCIATICA PRESENT: Primary | ICD-10-CM

## 2021-07-21 PROCEDURE — 97110 THERAPEUTIC EXERCISES: CPT | Performed by: PHYSICAL THERAPIST

## 2021-07-21 PROCEDURE — 97112 NEUROMUSCULAR REEDUCATION: CPT | Performed by: PHYSICAL THERAPIST

## 2021-07-21 NOTE — PROGRESS NOTES
PT Evaluation     Today's date: 2021  Patient name: Ken Munson  : 1955  MRN: [id number]  Referring provider: Katie Morrissey PA-C  Dx:   No diagnosis found  Assessment  Assessment details: Pt is being treated with outpatient PT  She has  made slow gains in rom, strength, pain reduction and functional mobility but continues to have deficits  She will benefit from resuming skilled PT to address these deficits  Thank you for this referral       Impairments: abnormal coordination, abnormal or restricted ROM, activity intolerance, impaired physical strength and pain with function  Understanding of Dx/Px/POC: good   Prognosis: good    Goals  STG:  met  1  Patient will report 25% decrease in pain in 4 weeks  2  Patient will demonstrate 25% improvement in ROM in 4 weeks  3  Patient will demonstrate 1/2 grade improvement in strength in 4 weeks  LT  Patient will be able to perform IADLS without restriction or pain by discharge  2  Patient will be independent in HEP by discharge  3  Patient will be able to return to recreational/work duties without restriction or pain by discharge  Able to stand >20 min with good posture and min pain      Plan  Patient would benefit from: PT eval and skilled PT  Planned modality interventions: cryotherapy and thermotherapy: hydrocollator packs  Planned therapy interventions: IADL retraining, body mechanics training, flexibility, functional ROM exercises, home exercise program, neuromuscular re-education, manual therapy, postural training, strengthening, stretching, therapeutic activities, therapeutic exercise and joint mobilization  Frequency: 2x week  Duration in weeks: 4  Treatment plan discussed with: patient        Subjective Evaluation    History of Present Illness  Mechanism of injury: Pt reports that she has been unable to attend PT for several weeks secondary to car trouble   Reports that since her LV she had an MRI that revealed arthritic changes and she has been instructed to resume PT at this time  Reports that she has most pain in her R lower quadrant with WB"ing especially when she is standing still  Reports that she was experiencing less pain when she was regularly attending PT but that the pain has increased again      Pain  Current pain ratin  At best pain ratin  At worst pain ratin    Treatments  No previous or current treatments  Patient Goals  Patient goal: Get this weight off me        Objective     Static Posture     Comments  Gait: increased trunk flexion, L knee valgus, decreased step length and overall gait speed  Thoracolumbar ROM grossly WFL, increased pain with return from flexion, also at end range extension    Strength  HS 4/5   Knee ext 4+/5  Able to perform hip flexion in short sit with significant trunk compensation, at least 3/5  Significant upper body compensation with attempted prone hip extension    TTP through R QL  Discomfort with CPA and UPA at L1/2    Poor TA and oblique contraction despite cueing    Significant difficulty with mat mobility within session                 Precautions: B TRIP      Manuals    R QL TPR   kl                                         Neuro Re-Ed           TA retraining           Standing press down into pball for core activation                                                       Ther Ex           Bike 10' 10' 10' 10' 10' 10   10' 10'   Lateral stepping Black  12ft x10  nv Black x10 black  15ft 4 Black  [id number] Black 12'x10 black  12ft x10   Sit to stand      NP     bridges      NP     Palloff press      NP     Hip abduction stand  5#  30 ea  nv 5#x30 4#  30 ea Stand  5#x30ea Stand 5# x30ea stand  abd, ext  5#  30 ea   Clamshell      NP      prone hip extension      NP     SKTC      NP     Pball lumbar flex 3 way 5"x10 ea 5"x10  ea 10"x10 ea 5"x10 5"x10 ea 10"x10ea 10"x10ea 10"x10 ea   LTR           Hamstring curls  50#  3x10 50#  3x10 50# x30 50#x30 50#   3x10  50#   3x10 50# 3x10  50#   3x10   Low rows  black   30  Black   30 Black x30 Black x30 black x30 black x30 Black x30  black   3x10   rows  black    30  Black   30 Black x30 Black x30 black x30 black x30 Black x30  black   3x10   pball wall squats      NP     Leg press squats 50#  3x10 50#  3x10 50# x30 50#x40  60#   4x10  60#   3x10 60# 3x10  60#   3x10   LAQ 30#  2x10 30#  2x10 30# x20 50#x20  40#   3x10  40#   3x10 40# 3x10  40#   3x10   Lateral step up and over nv nv         Hip hike nv nv                               Ther Activity                                 Gait Training                                 Modalities           P  10' pos-tx seated

## 2021-07-28 ENCOUNTER — OFFICE VISIT (OUTPATIENT)
Dept: PHYSICAL THERAPY | Facility: CLINIC | Age: 66
End: 2021-07-28
Payer: COMMERCIAL

## 2021-07-28 DIAGNOSIS — M54.50 BILATERAL LOW BACK PAIN, UNSPECIFIED CHRONICITY, UNSPECIFIED WHETHER SCIATICA PRESENT: Primary | ICD-10-CM

## 2021-07-28 PROCEDURE — 97110 THERAPEUTIC EXERCISES: CPT | Performed by: PHYSICAL THERAPIST

## 2021-07-28 PROCEDURE — 97140 MANUAL THERAPY 1/> REGIONS: CPT | Performed by: PHYSICAL THERAPIST

## 2021-07-28 NOTE — PROGRESS NOTES
Daily Note     Today's date: 2021  Patient name: Leela Boo  : 1955  MRN: [id number]  Referring provider: Jelly Chaidez PA-C  Dx:   Encounter Diagnosis     ICD-10-CM    1  Bilateral low back pain, unspecified chronicity, unspecified whether sciatica present  M54 5                   Subjective: Pt reports no new changes  Objective: See treatment diary below      Assessment: Pt able to tolerate progression without complaints  Unable to perform hip hiking despite manual/tactile cueing  Plan: Continue per plan of care        Precautions: B TRIP      Manuals    R QL TPR   kl MD                                        Neuro Re-Ed           TA retraining           Standing press down into pball for core activation                                                       Ther Ex           Bike 10' 10' 10' 10'    10'   Lateral stepping Black  12ft x10  nv     black  12ft x10   Sit to stand           bridges           Palloff press           Hip abduction stand  5#  30 ea  nv     stand  abd, ext  5#  30 ea   Clamshell            prone hip extension           SKTC           Pball lumbar flex 3 way 5"x10 ea 5"x10  ea 10"x10 ea 10x10" ea    10"x10 ea   LTR           Hamstring curls  50#  3x10 50#  3x10 50# x30 50# x30     50#   3x10   Low rows  black   30  Black   30 Black x30 Black x30     black   3x10   rows  black    30  Black   30 Black x30 Black x30     black   3x10   pball wall squats           Leg press squats 50#  3x10 50#  3x10 50# x30 50# x30     60#   3x10   LAQ 30#  2x10 30#  2x10 30# x20 30#x20     40#   3x10   Lateral step up and over nv nv  6"x20       Hip hike nv nv  unable                             Ther Activity                                 Gait Training                                 Modalities           [facility]HP  10' pos-tx seated

## 2021-07-30 ENCOUNTER — OFFICE VISIT (OUTPATIENT)
Dept: PHYSICAL THERAPY | Facility: CLINIC | Age: 66
End: 2021-07-30
Payer: COMMERCIAL

## 2021-07-30 DIAGNOSIS — M54.50 BILATERAL LOW BACK PAIN, UNSPECIFIED CHRONICITY, UNSPECIFIED WHETHER SCIATICA PRESENT: Primary | ICD-10-CM

## 2021-07-30 PROCEDURE — 97140 MANUAL THERAPY 1/> REGIONS: CPT | Performed by: PHYSICAL THERAPIST

## 2021-07-30 PROCEDURE — 97110 THERAPEUTIC EXERCISES: CPT | Performed by: PHYSICAL THERAPIST

## 2021-08-04 ENCOUNTER — OFFICE VISIT (OUTPATIENT)
Dept: PHYSICAL THERAPY | Facility: CLINIC | Age: 66
End: 2021-08-04
Payer: COMMERCIAL

## 2021-08-04 DIAGNOSIS — M54.50 BILATERAL LOW BACK PAIN, UNSPECIFIED CHRONICITY, UNSPECIFIED WHETHER SCIATICA PRESENT: Primary | ICD-10-CM

## 2021-08-04 PROCEDURE — 97110 THERAPEUTIC EXERCISES: CPT

## 2021-08-04 NOTE — PROGRESS NOTES
Daily Note     Today's date: 2021  Patient name: Elias Dumont  : 1955  MRN: [id number]  Referring provider: Lesa Flanagan PA-C  Dx:   Encounter Diagnosis     ICD-10-CM    1  Bilateral low back pain, unspecified chronicity, unspecified whether sciatica present  M54 5                   Subjective: Patient reports she is feeling "okay" prior to today's PT treatment  Objective: See treatment diary below  Assessment: Treatment is tolerated well  Verbal cues required in order to correctly perform hip hike and lateral step up and over exercises  Patient would benefit from continued PT to prevent a decline in function  Plan: Continue treatment as per PT plan of care         Precautions: B TRIP      Manuals      R QL TPR   kl MD kl                                       Neuro Re-Ed           TA retraining           Standing press down into pball for core activation                                                       Ther Ex           Bike 10' 10' 10' 10' 10' 8'     Lateral stepping Black  12ft x10  nv        Sit to stand           bridges           Palloff press           Hip abduction stand  5#  30 ea  nv        Clamshell            prone hip extension           SKTC           Pball lumbar flex 3 way 5"x10 ea 5"x10  ea 10"x10 ea 10x10" ea 10"x10 10"x10     LTR           Hamstring curls  50#  3x10 50#  3x10 50# x30 50# x30 50#x30  50#   3x10     Low rows  black   30  Black   30 Black x30 Black x30 Black x30  black    3x10     rows  black    30  Black   30 Black x30 Black x30 Black x30  black   3x10     pball wall squats           Leg press squats 50#  3x10 50#  3x10 50# x30 50# x30 50#x30  50#   3x10     LAQ 30#  2x10 30#  2x10 30# x20 30#x20 30#x30  30#   3x10     Lateral step up and over nv nv  6"x20 8"x20  8" x30     Hip hike nv nv  unable 4"x20  on floor   20 ea                           Ther Activity Modalities           MHP  10' pos-tx seated   10'

## 2021-08-06 ENCOUNTER — OFFICE VISIT (OUTPATIENT)
Dept: PHYSICAL THERAPY | Facility: CLINIC | Age: 66
End: 2021-08-06
Payer: COMMERCIAL

## 2021-08-06 DIAGNOSIS — M54.50 BILATERAL LOW BACK PAIN, UNSPECIFIED CHRONICITY, UNSPECIFIED WHETHER SCIATICA PRESENT: Primary | ICD-10-CM

## 2021-08-06 PROCEDURE — 97110 THERAPEUTIC EXERCISES: CPT

## 2021-08-06 NOTE — PROGRESS NOTES
Daily Note     Today's date: 2021  Patient name: Anamaria An  : 1955  MRN: [id number]  Referring provider: Rodolfo Aleman PA-C  Dx:   Encounter Diagnosis     ICD-10-CM    1  Bilateral low back pain, unspecified chronicity, unspecified whether sciatica present  M54 5                   Subjective: Patient reports her low back is feeling sore after moving a table yesterday  Objective: See treatment diary below  Assessment: Treatment is tolerated well  Patient would benefit from continued PT to prevent a decline in function  Plan: Continue treatment as per PT plan of care         Precautions: B TRIP      Manuals     R QL TPR   kl MD kl                                       Neuro Re-Ed           TA retraining           Standing press down into pball for core activation                                                       Ther Ex           Bike 10' 10' 10' 10' 10' 8' 10'    Lateral stepping Black  12ft x10  nv        Sit to stand           bridges           Palloff press           Hip abduction stand  5#  30 ea  nv        Clamshell            prone hip extension           SKTC           Pball lumbar flex 3 way 5"x10 ea 5"x10  ea 10"x10 ea 10x10" ea 10"x10 10"x10 10"x10    LTR           Hamstring curls  50#  3x10 50#  3x10 50# x30 50# x30 50#x30  50#   3x10  50#   3x10    Low rows  black   30  Black   30 Black x30 Black x30 Black x30  black    3x10  black   3x10    rows  black    30  Black   30 Black x30 Black x30 Black x30  black   3x10  black   3x10    pball wall squats           Leg press squats 50#  3x10 50#  3x10 50# x30 50# x30 50#x30  50#   3x10  50#   3x10    LAQ 30#  2x10 30#  2x10 30# x20 30#x20 30#x30  30#   3x10  50#   3x10    Lateral step up and over nv nv  6"x20 8"x20  8" x30  8" x30    Hip hike nv nv  unable 4"x20  on floor   20 ea  on floor   20 ea                          Ther Activity Modalities           MHP  10' pos-tx seated   10'

## 2021-08-11 ENCOUNTER — APPOINTMENT (OUTPATIENT)
Dept: PHYSICAL THERAPY | Facility: CLINIC | Age: 66
End: 2021-08-11
Payer: COMMERCIAL

## 2021-08-13 ENCOUNTER — OFFICE VISIT (OUTPATIENT)
Dept: PHYSICAL THERAPY | Facility: CLINIC | Age: 66
End: 2021-08-13
Payer: COMMERCIAL

## 2021-08-13 DIAGNOSIS — M54.50 BILATERAL LOW BACK PAIN, UNSPECIFIED CHRONICITY, UNSPECIFIED WHETHER SCIATICA PRESENT: Primary | ICD-10-CM

## 2021-08-13 PROCEDURE — 97110 THERAPEUTIC EXERCISES: CPT

## 2021-08-13 NOTE — PROGRESS NOTES
Daily Note     Today's date: 2021  Patient name: Rosa Melchor  : 1955  MRN: [id number]  Referring provider: Talia Quinn PA-C  Dx:   Encounter Diagnosis     ICD-10-CM    1  Bilateral low back pain, unspecified chronicity, unspecified whether sciatica present  M54 5                   Subjective: After the elevator broke in her apartment building and she had to do several flights of stairs, patient reports her back was "nagging "      Objective: See treatment diary below  Assessment: Therapeutic exercise program is tolerated well without complaints  Patient would benefit from continued PT to decrease back pain and improve strength  Plan: Continue treatment as per PT plan of care         Precautions: B TRIP      Manuals    R QL TPR   kl MD kl                                       Neuro Re-Ed           TA retraining           Standing press down into pball for core activation                                                       Ther Ex           Bike 10' 10' 10' 10' 10' 8' 10' 10'   Lateral stepping Black  12ft x10  nv        Sit to stand           bridges           Palloff press           Hip abduction stand  5#  30 ea  nv        Clamshell            prone hip extension           SKTC           Pball lumbar flex 3 way 5"x10 ea 5"x10  ea 10"x10 ea 10x10" ea 10"x10 10"x10 10"x10 10"x10   LTR           Hamstring curls  50#  3x10 50#  3x10 50# x30 50# x30 50#x30  50#   3x10  50#   3x10  50#   3x10   Low rows  black   30  Black   30 Black x30 Black x30 Black x30  black    3x10  black   3x10  black   3x10   rows  black    30  Black   30 Black x30 Black x30 Black x30  black   3x10  black   3x10  black   3x10   pball wall squats           Leg press squats 50#  3x10 50#  3x10 50# x30 50# x30 50#x30  50#   3x10  50#   3x10  50#   3x10   LAQ 30#  2x10 30#  2x10 30# x20 30#x20 30#x30  30#   3x10  50#   3x10  50#   3x10   Lateral step up and over nv nv  6"x20 8"x20  8" x30  8" x30  8" x30   Hip hike nv nv  unable 4"x20  on floor   20 ea  on floor   20 ea  on floor   20 ea                         Ther Activity                                                       Modalities           MHP  10' pos-tx seated   10'

## 2021-08-18 ENCOUNTER — OFFICE VISIT (OUTPATIENT)
Dept: PHYSICAL THERAPY | Facility: CLINIC | Age: 66
End: 2021-08-18
Payer: COMMERCIAL

## 2021-08-18 DIAGNOSIS — M54.50 BILATERAL LOW BACK PAIN, UNSPECIFIED CHRONICITY, UNSPECIFIED WHETHER SCIATICA PRESENT: Primary | ICD-10-CM

## 2021-08-18 PROCEDURE — 97110 THERAPEUTIC EXERCISES: CPT | Performed by: PHYSICAL THERAPIST

## 2021-08-18 NOTE — PROGRESS NOTES
Daily Note     Today's date: 2021  Patient name: Rosa Melchor  : 1955  MRN: [id number]  Referring provider: Talia Quinn PA-C  Dx:   Encounter Diagnosis     ICD-10-CM    1  Bilateral low back pain, unspecified chronicity, unspecified whether sciatica present  M54 5        Start Time: 1400  Stop Time: 3722  Total time in clinic (min): 45 minutes    Subjective: Patient reports some discomfort in her back this afternoon  Objective: See treatment diary below      Assessment: Patient tolerated treatment well and was able to perform prescribed activity without aggravation of sx  However, she is adequately challenged with activity and reported increased fatigue post session  Progress, as tolerated  Plan: Continue per plan of care        Precautions: B TRIP      Manuals    R QL TPR   kl MD kl                                       Neuro Re-Ed           TA retraining           Standing press down into pball for core activation                                                       Ther Ex           Bike 10    10' 10' 10' 8' 10' 10'   Lateral stepping   nv        Sit to stand           bridges           Palloff press           Hip abduction   nv        Clamshell            prone hip extension           SKTC           Pball lumbar flex 3 way 10"x10 ea  10"x10 ea 10x10" ea 10"x10 10"x10 10"x10 10"x10   LTR           Hamstring curls  50# x30  50# x30 50# x30 50#x30  50#   3x10  50#   3x10  50#   3x10   Low rows Black 30x  Black x30 Black x30 Black x30  black    3x10  black   3x10  black   3x10   rows Black 30x  Black x30 Black x30 Black x30  black   3x10  black   3x10  black   3x10   pball wall squats           Leg press squats 50# 30x  50# x30 50# x30 50#x30  50#   3x10  50#   3x10  50#   3x10   LAQ 30# 20x   30# x20 30#x20 30#x30  30#   3x10  50#   3x10  50#   3x10   Lateral step up and over    6"x20 8"x20  8" x30  8" x30  8" x30   Hip hike On floor 20 ea   unable 4"x20 on floor   20 ea  on floor   20 ea  on floor   20 ea                         Ther Activity                                                       Modalities           MHP     10'

## 2021-08-20 ENCOUNTER — OFFICE VISIT (OUTPATIENT)
Dept: PHYSICAL THERAPY | Facility: CLINIC | Age: 66
End: 2021-08-20
Payer: COMMERCIAL

## 2021-08-20 DIAGNOSIS — M54.50 BILATERAL LOW BACK PAIN, UNSPECIFIED CHRONICITY, UNSPECIFIED WHETHER SCIATICA PRESENT: Primary | ICD-10-CM

## 2021-08-20 PROCEDURE — 97110 THERAPEUTIC EXERCISES: CPT

## 2021-08-20 NOTE — PROGRESS NOTES
Daily Note     Today's date: 2021  Patient name: Greg Nye  : 1955  MRN: [id number]  Referring provider: Renae Chavez PA-C  Dx:   Encounter Diagnosis     ICD-10-CM    1  Bilateral low back pain, unspecified chronicity, unspecified whether sciatica present  M54 5        Start Time: 1345  Stop Time: 1430  Total time in clinic (min): 45 minutes      Subjective: Patient reports she was "in agony" when waking up on Thursday morning however symptoms are slightly better today - "It's not in the hips  It's in the back "      Objective: See treatment diary below  Assessment: Therapeutic exercise program is tolerated well without complaints  Patient would benefit from continued PT to decrease back pain and improve strength  Plan: Continue treatment as per PT plan of care         Precautions: B TRIP      Manuals    R QL TPR     kl                                       Neuro Re-Ed           TA retraining           Standing press down into pball for core activation                                                       Ther Ex           Bike 10    10'   10' 8' 10' 10'   Lateral stepping           Sit to stand           bridges           Palloff press           Hip abduction           Clamshell            prone hip extension           SKTC           Pball lumbar flex 3 way 10"x10 ea 10"x10 ea   10"x10 10"x10 10"x10 10"x10   LTR           Hamstring curls  50# x30  50#   30   50#x30  50#   3x10  50#   3x10  50#   3x10   Low rows Black 30x  black   30   Black x30  black    3x10  black   3x10  black   3x10   rows Black 30x  black   30   Black x30  black   3x10  black   3x10  black   3x10   pball wall squats           Leg press squats 50# 30x  50#   30   50#x30  50#   3x10  50#   3x10  50#   3x10   LAQ 30# 20x   30#   30   30#x30  30#   3x10  50#   3x10  50#   3x10   Lateral step up and over   8" x30   8"x20  8" x30  8" x30  8" x30   Hip hike On floor 20 ea  on floor   30 ea 4"x20  on floor   20 ea  on floor   20 ea  on floor   20 ea                         Ther Activity                                                       Modalities           [facility]     10'

## 2021-08-25 ENCOUNTER — APPOINTMENT (OUTPATIENT)
Dept: PHYSICAL THERAPY | Facility: CLINIC | Age: 66
End: 2021-08-25
Payer: COMMERCIAL

## 2021-08-27 ENCOUNTER — EVALUATION (OUTPATIENT)
Dept: PHYSICAL THERAPY | Facility: CLINIC | Age: 66
End: 2021-08-27
Payer: COMMERCIAL

## 2021-08-27 DIAGNOSIS — M54.50 BILATERAL LOW BACK PAIN, UNSPECIFIED CHRONICITY, UNSPECIFIED WHETHER SCIATICA PRESENT: Primary | ICD-10-CM

## 2021-08-27 PROCEDURE — 97110 THERAPEUTIC EXERCISES: CPT | Performed by: PHYSICAL THERAPIST

## 2021-08-27 NOTE — PROGRESS NOTES
Daily Note/DC summary    Today's date: 2021  Patient name: Santy Goel  : 1955  MRN: [id number]  Referring provider: Luzmaria Gu PA-C  Dx:   Encounter Diagnosis     ICD-10-CM    1  Bilateral low back pain, unspecified chronicity, unspecified whether sciatica present  M54 5                     Subjective: pt reports that she continues to have symptoms but reports compliance with her hep and that she plans to join the local [facility] to continue indep      Objective: See treatment diary below  Assessment: demonstrated independence with her hep with good form t/o  Plan: DC to hep with instruction to call the clinic if symptoms recur or if she has questions regarding her hep           Precautions: B TRIP      Manuals    R QL TPR     kl                                       Neuro Re-Ed           TA retraining           Standing press down into pball for core activation                                                       Ther Ex           Bike 10    10' 10'  10' 8' 10' 10'   Lateral stepping           Sit to stand           bridges           Palloff press           Hip abduction           Clamshell            prone hip extension           SKTC           Pball lumbar flex 3 way 10"x10 ea 10"x10 ea 10"x10  10"x10 10"x10 10"x10 10"x10   LTR           Hamstring curls  50# x30  50#   30 50#x30  50#x30  50#   3x10  50#   3x10  50#   3x10   Low rows Black 30x  black   30 Black x30  Black x30  black    3x10  black   3x10  black   3x10   rows Black 30x  black   30 Black x30  Black x30  black   3x10  black   3x10  black   3x10   pball wall squats           Leg press squats 50# 30x  50#   30 50"  50#x30  50#   3x10  50#   3x10  50#   3x10   LAQ 30# 20x   30#   30 30#x30  30#x30  30#   3x10  50#   3x10  50#   3x10   Lateral step up and over   8" x30 8"x30  8"x20  8" x30  8" x30  8" x30   Hip hike On floor 20 ea  on floor   30 ea x30  4"x20  on floor   20 ea  on floor   20 ea on floor   20 ea                         Ther Activity                                                       Modalities           MHP     10'

## 2021-10-08 ENCOUNTER — IMMUNIZATIONS (OUTPATIENT)
Dept: FAMILY MEDICINE CLINIC | Facility: CLINIC | Age: 66
End: 2021-10-08
Payer: COMMERCIAL

## 2021-10-08 DIAGNOSIS — Z23 ENCOUNTER FOR IMMUNIZATION: Primary | ICD-10-CM

## 2021-10-08 PROCEDURE — 90662 IIV NO PRSV INCREASED AG IM: CPT

## 2021-10-08 PROCEDURE — G0008 ADMIN INFLUENZA VIRUS VAC: HCPCS

## 2021-10-20 ENCOUNTER — OFFICE VISIT (OUTPATIENT)
Dept: FAMILY MEDICINE CLINIC | Facility: CLINIC | Age: 66
End: 2021-10-20
Payer: COMMERCIAL

## 2021-10-20 VITALS
DIASTOLIC BLOOD PRESSURE: 72 MMHG | HEIGHT: 61 IN | HEART RATE: 72 BPM | SYSTOLIC BLOOD PRESSURE: 126 MMHG | WEIGHT: 196.9 LBS | RESPIRATION RATE: 16 BRPM | BODY MASS INDEX: 37.17 KG/M2

## 2021-10-20 DIAGNOSIS — G89.29 OTHER CHRONIC BACK PAIN: Primary | ICD-10-CM

## 2021-10-20 DIAGNOSIS — F43.23 SITUATIONAL MIXED ANXIETY AND DEPRESSIVE DISORDER: ICD-10-CM

## 2021-10-20 DIAGNOSIS — M54.9 OTHER CHRONIC BACK PAIN: Primary | ICD-10-CM

## 2021-10-20 PROCEDURE — 1036F TOBACCO NON-USER: CPT | Performed by: PHYSICIAN ASSISTANT

## 2021-10-20 PROCEDURE — 3008F BODY MASS INDEX DOCD: CPT | Performed by: PHYSICIAN ASSISTANT

## 2021-10-20 PROCEDURE — 99214 OFFICE O/P EST MOD 30 MIN: CPT | Performed by: PHYSICIAN ASSISTANT

## 2021-10-20 RX ORDER — DULOXETIN HYDROCHLORIDE 30 MG/1
30 CAPSULE, DELAYED RELEASE ORAL DAILY
Qty: 30 CAPSULE | Refills: 3 | Status: SHIPPED | OUTPATIENT
Start: 2021-10-20 | End: 2021-12-10 | Stop reason: ALTCHOICE

## 2021-11-05 ENCOUNTER — TELEPHONE (OUTPATIENT)
Dept: FAMILY MEDICINE CLINIC | Facility: CLINIC | Age: 66
End: 2021-11-05

## 2021-11-23 ENCOUNTER — HOSPITAL ENCOUNTER (OUTPATIENT)
Dept: MAMMOGRAPHY | Facility: IMAGING CENTER | Age: 66
Discharge: HOME | End: 2021-11-23
Payer: COMMERCIAL

## 2021-11-23 VITALS — HEIGHT: 60 IN | BODY MASS INDEX: 38.45 KG/M2

## 2021-11-23 DIAGNOSIS — Z12.31 ENCOUNTER FOR SCREENING MAMMOGRAM FOR MALIGNANT NEOPLASM OF BREAST: ICD-10-CM

## 2021-11-23 PROCEDURE — 77063 BREAST TOMOSYNTHESIS BI: CPT

## 2021-11-23 PROCEDURE — 77067 SCR MAMMO BI INCL CAD: CPT

## 2021-12-10 ENCOUNTER — OFFICE VISIT (OUTPATIENT)
Dept: FAMILY MEDICINE CLINIC | Facility: CLINIC | Age: 66
End: 2021-12-10
Payer: COMMERCIAL

## 2021-12-10 VITALS
SYSTOLIC BLOOD PRESSURE: 124 MMHG | HEIGHT: 61 IN | WEIGHT: 193 LBS | BODY MASS INDEX: 36.44 KG/M2 | HEART RATE: 72 BPM | DIASTOLIC BLOOD PRESSURE: 80 MMHG

## 2021-12-10 DIAGNOSIS — Z00.00 ENCOUNTER FOR SUBSEQUENT ANNUAL WELLNESS VISIT (AWV) IN MEDICARE PATIENT: Primary | ICD-10-CM

## 2021-12-10 PROBLEM — H25.89 OTHER AGE-RELATED CATARACT: Status: ACTIVE | Noted: 2021-12-10

## 2021-12-10 PROCEDURE — 1036F TOBACCO NON-USER: CPT | Performed by: PHYSICIAN ASSISTANT

## 2021-12-10 PROCEDURE — 3288F FALL RISK ASSESSMENT DOCD: CPT | Performed by: PHYSICIAN ASSISTANT

## 2021-12-10 PROCEDURE — 3008F BODY MASS INDEX DOCD: CPT | Performed by: PHYSICIAN ASSISTANT

## 2021-12-10 PROCEDURE — 1101F PT FALLS ASSESS-DOCD LE1/YR: CPT | Performed by: PHYSICIAN ASSISTANT

## 2021-12-10 PROCEDURE — G0439 PPPS, SUBSEQ VISIT: HCPCS | Performed by: PHYSICIAN ASSISTANT

## 2021-12-10 PROCEDURE — 3725F SCREEN DEPRESSION PERFORMED: CPT | Performed by: PHYSICIAN ASSISTANT

## 2022-01-19 ENCOUNTER — TELEPHONE (OUTPATIENT)
Dept: FAMILY MEDICINE CLINIC | Facility: CLINIC | Age: 67
End: 2022-01-19

## 2022-01-20 ENCOUNTER — OFFICE VISIT (OUTPATIENT)
Dept: FAMILY MEDICINE CLINIC | Facility: CLINIC | Age: 67
End: 2022-01-20
Payer: COMMERCIAL

## 2022-01-20 VITALS
BODY MASS INDEX: 36.85 KG/M2 | SYSTOLIC BLOOD PRESSURE: 112 MMHG | HEART RATE: 79 BPM | TEMPERATURE: 98 F | DIASTOLIC BLOOD PRESSURE: 70 MMHG | WEIGHT: 195.2 LBS | HEIGHT: 61 IN | RESPIRATION RATE: 16 BRPM

## 2022-01-20 DIAGNOSIS — Z01.818 PREOP EXAMINATION: Primary | ICD-10-CM

## 2022-01-20 PROCEDURE — 3008F BODY MASS INDEX DOCD: CPT | Performed by: NURSE PRACTITIONER

## 2022-01-20 PROCEDURE — 99213 OFFICE O/P EST LOW 20 MIN: CPT | Performed by: NURSE PRACTITIONER

## 2022-01-20 PROCEDURE — 1160F RVW MEDS BY RX/DR IN RCRD: CPT | Performed by: NURSE PRACTITIONER

## 2022-01-20 PROCEDURE — 1036F TOBACCO NON-USER: CPT | Performed by: NURSE PRACTITIONER

## 2022-01-20 RX ORDER — KETOROLAC TROMETHAMINE 5 MG/ML
SOLUTION OPHTHALMIC
COMMUNITY
Start: 2022-01-18

## 2022-01-20 RX ORDER — OFLOXACIN 3 MG/ML
SOLUTION/ DROPS OPHTHALMIC
COMMUNITY
Start: 2022-01-18

## 2022-01-20 RX ORDER — PREDNISOLONE ACETATE 10 MG/ML
SUSPENSION/ DROPS OPHTHALMIC
COMMUNITY
Start: 2022-01-18

## 2022-09-15 ENCOUNTER — OFFICE VISIT (OUTPATIENT)
Dept: FAMILY MEDICINE CLINIC | Facility: CLINIC | Age: 67
End: 2022-09-15
Payer: COMMERCIAL

## 2022-09-15 ENCOUNTER — APPOINTMENT (OUTPATIENT)
Dept: RADIOLOGY | Facility: CLINIC | Age: 67
End: 2022-09-15
Payer: COMMERCIAL

## 2022-09-15 VITALS
HEART RATE: 74 BPM | SYSTOLIC BLOOD PRESSURE: 118 MMHG | WEIGHT: 204.1 LBS | RESPIRATION RATE: 16 BRPM | BODY MASS INDEX: 38.53 KG/M2 | HEIGHT: 61 IN | DIASTOLIC BLOOD PRESSURE: 70 MMHG

## 2022-09-15 DIAGNOSIS — Z12.31 ENCOUNTER FOR SCREENING MAMMOGRAM FOR MALIGNANT NEOPLASM OF BREAST: ICD-10-CM

## 2022-09-15 DIAGNOSIS — E66.9 OBESITY (BMI 35.0-39.9 WITHOUT COMORBIDITY): ICD-10-CM

## 2022-09-15 DIAGNOSIS — M54.42 ACUTE BILATERAL LOW BACK PAIN WITH LEFT-SIDED SCIATICA: Primary | ICD-10-CM

## 2022-09-15 DIAGNOSIS — R63.5 WEIGHT GAIN: ICD-10-CM

## 2022-09-15 DIAGNOSIS — M54.42 ACUTE BILATERAL LOW BACK PAIN WITH LEFT-SIDED SCIATICA: ICD-10-CM

## 2022-09-15 PROCEDURE — 72110 X-RAY EXAM L-2 SPINE 4/>VWS: CPT

## 2022-09-15 PROCEDURE — 1160F RVW MEDS BY RX/DR IN RCRD: CPT | Performed by: PHYSICIAN ASSISTANT

## 2022-09-15 PROCEDURE — 99214 OFFICE O/P EST MOD 30 MIN: CPT | Performed by: PHYSICIAN ASSISTANT

## 2022-09-15 RX ORDER — MELOXICAM 15 MG/1
15 TABLET ORAL DAILY
Qty: 30 TABLET | Refills: 5 | Status: SHIPPED | OUTPATIENT
Start: 2022-09-15

## 2022-09-15 RX ORDER — OLOPATADINE HYDROCHLORIDE 1 MG/ML
1 SOLUTION/ DROPS OPHTHALMIC 2 TIMES DAILY
COMMUNITY

## 2022-09-15 NOTE — PROGRESS NOTES
Assessment/Plan:    1  Acute bilateral low back pain with left-sided sciatica    - Probable DDD, will probably need PT, will order XR per patients request, try mobic daily with food, work on exercise and weight loss  - XR spine lumbar minimum 4 views non injury; Future  - meloxicam (Mobic) 15 mg tablet; Take 1 tablet (15 mg total) by mouth daily  Dispense: 30 tablet; Refill: 5    F/u as needed      Subjective:   Chief Complaint   Patient presents with    Hip Pain     L    Leg Pain     L      Patient ID: Su Tom is a 78 y o  female  Patient here complaining of low back pain radiating down to left thigh, anterior thigh  Sharp pain  Tried icy hot, hot/warm compress without relief  Denies trauma  Takes Tylenol with relief at night  Denies weakness  Requesting XR        The following portions of the patient's history were reviewed and updated as appropriate: allergies, current medications, past family history, past medical history, past social history, past surgical history and problem list     Past Medical History:   Diagnosis Date    Blepharitis of eyelid of left eye      Past Surgical History:   Procedure Laterality Date    CATARACT EXTRACTION Bilateral     DENTAL SURGERY      [place] teeth extraction    HIP ARTHROPLASTY      Right    HIP ARTHROSCOPY      Left    TUBAL LIGATION       Family History   Problem Relation Age of Onset    Kidney cancer Mother     Lung cancer Father     No Known Problems Sister     Mental illness Cousin     No Known Problems Maternal Grandmother     No Known Problems Maternal Grandfather     No Known Problems Paternal Grandmother     No Known Problems Paternal Grandfather     Breast cancer Maternal Aunt         70's    No Known Problems Maternal Aunt     No Known Problems Maternal Aunt     No Known Problems Maternal Aunt     No Known Problems Maternal Aunt     No Known Problems Maternal Aunt     No Known Problems Paternal Aunt     Substance Abuse Neg Hx  Alcohol abuse Neg Hx      Social History     Socioeconomic History    Marital status: Single     Spouse name: Not on file    Number of children: Not on file    Years of education: Not on file    Highest education level: Not on file   Occupational History    Not on file   Tobacco Use    Smoking status: Never Smoker    Smokeless tobacco: Never Used   Vaping Use    Vaping Use: Never used   Substance and Sexual Activity    Alcohol use: No    Drug use: No    Sexual activity: Not on file   Other Topics Concern    Not on file   Social History Narrative    Always uses a seatbelt    Caffeine use- 1 cup of coffee per day    Has smoke detectors     Social Determinants of Health     Financial Resource Strain: Not on file   Food Insecurity: Not on file   Transportation Needs: Not on file   Physical Activity: Not on file   Stress: Not on file   Social Connections: Not on file   Intimate Partner Violence: Not on file   Housing Stability: Not on file       Current Outpatient Medications:     ascorbic acid (VITAMIN C) 500 mg tablet, Take 500 mg by mouth daily, Disp: , Rfl:     Calcium Citrate-Vitamin D (CALCIUM CITRATE CHEWY BITE PO), Take by mouth, Disp: , Rfl:     Flaxseed, Linseed, (FLAXSEED OIL PO), Take by mouth, Disp: , Rfl:     loratadine (CLARITIN) 10 mg tablet, Take 1 tablet (10 mg total) by mouth daily, Disp: 30 tablet, Rfl: 0    multivitamin (THERAGRAN) TABS, Take 1 tablet by mouth daily, Disp: , Rfl:     olopatadine (PATANOL) 0 1 % ophthalmic solution, 1 drop 2 (two) times a day, Disp: , Rfl:     Omega-3 Fatty Acids (FISH OIL) 500 MG CAPS, Take by mouth 3 (three) times a day, Disp: , Rfl:     Review of Systems          Objective:    Vitals:    09/15/22 1209   BP: 118/70   Pulse: 74   Resp: 16   Weight: 92 6 kg (204 lb 1 6 oz)   Height: 5' 1" (1 549 m)        Physical Exam  Constitutional:       Appearance: Normal appearance  HENT:      Head: Normocephalic and atraumatic     Cardiovascular: Rate and Rhythm: Normal rate and regular rhythm  Pulses: Normal pulses  Heart sounds: Normal heart sounds  Pulmonary:      Effort: Pulmonary effort is normal       Breath sounds: Normal breath sounds  Musculoskeletal:         General: Normal range of motion  Comments: Tender over lumbar region radiating around to left thigh, perceived by patient, not palpable, heel/toe raise normal, full ROM, SLR negative, DTR +2   Neurological:      General: No focal deficit present  Mental Status: She is alert and oriented to person, place, and time  Psychiatric:         Mood and Affect: Mood normal          Behavior: Behavior normal          Thought Content:  Thought content normal          Judgment: Judgment normal

## 2022-09-19 ENCOUNTER — TELEPHONE (OUTPATIENT)
Dept: FAMILY MEDICINE CLINIC | Facility: CLINIC | Age: 67
End: 2022-09-19

## 2022-09-21 NOTE — TELEPHONE ENCOUNTER
Multi level degenerative changes in her low spine  Patient would really benefit from seeing physical therapy

## 2022-09-21 NOTE — TELEPHONE ENCOUNTER
So  [facility] She also said she feels its in her hip and sciatic pain that goes to yhe middle of her leg to the knee    did you enter an order for PT?

## 2022-09-22 NOTE — TELEPHONE ENCOUNTER
Sciatic is from her back, most likely the back is causing the hip pain, this is very common  Swimming may help, hard to say  I did not because she refused PT at the visit

## 2022-09-23 ENCOUNTER — TELEPHONE (OUTPATIENT)
Dept: FAMILY MEDICINE CLINIC | Facility: CLINIC | Age: 67
End: 2022-09-23

## 2022-09-23 NOTE — TELEPHONE ENCOUNTER
Pt wants to know if it's OK for her to take extra strength tylenol along with her other medications?

## 2022-10-12 PROBLEM — Z00.00 ENCOUNTER FOR SUBSEQUENT ANNUAL WELLNESS VISIT (AWV) IN MEDICARE PATIENT: Status: RESOLVED | Noted: 2021-12-10 | Resolved: 2022-10-12

## 2022-10-13 ENCOUNTER — IMMUNIZATIONS (OUTPATIENT)
Dept: FAMILY MEDICINE CLINIC | Facility: CLINIC | Age: 67
End: 2022-10-13
Payer: COMMERCIAL

## 2022-10-13 DIAGNOSIS — Z23 ENCOUNTER FOR IMMUNIZATION: Primary | ICD-10-CM

## 2022-10-13 PROCEDURE — G0008 ADMIN INFLUENZA VIRUS VAC: HCPCS

## 2022-10-13 PROCEDURE — 90662 IIV NO PRSV INCREASED AG IM: CPT

## 2022-12-08 ENCOUNTER — RA CDI HCC (OUTPATIENT)
Dept: OTHER | Facility: HOSPITAL | Age: 67
End: 2022-12-08

## 2022-12-08 NOTE — PROGRESS NOTES
[facility]  coding opportunities       Chart reviewed, no opportunity found:   [address]        Patients Insurance     Medicare Insurance: [employer]

## 2022-12-14 ENCOUNTER — OFFICE VISIT (OUTPATIENT)
Dept: FAMILY MEDICINE CLINIC | Facility: CLINIC | Age: 67
End: 2022-12-14

## 2022-12-14 VITALS
HEART RATE: 77 BPM | BODY MASS INDEX: 36.76 KG/M2 | WEIGHT: 194.7 LBS | SYSTOLIC BLOOD PRESSURE: 120 MMHG | DIASTOLIC BLOOD PRESSURE: 78 MMHG | RESPIRATION RATE: 16 BRPM | HEIGHT: 61 IN

## 2022-12-14 DIAGNOSIS — Z00.00 MEDICARE ANNUAL WELLNESS VISIT, SUBSEQUENT: Primary | ICD-10-CM

## 2022-12-14 DIAGNOSIS — Z23 IMMUNIZATION DUE: ICD-10-CM

## 2022-12-14 DIAGNOSIS — Z13.220 SCREENING, LIPID: ICD-10-CM

## 2022-12-14 DIAGNOSIS — Z13.1 SCREENING FOR DIABETES MELLITUS: ICD-10-CM

## 2022-12-14 NOTE — PROGRESS NOTES
BMI Counseling: Body mass index is 36 79 kg/m²  The BMI is above normal  Nutrition recommendations include reducing portion sizes  Assessment and Plan:     AWV - conducted     Preventive health issues were discussed with patient, and age appropriate screening tests were ordered as noted in patient's After Visit Summary  Personalized health advice and appropriate referrals for health education or preventive services given if needed, as noted in patient's After Visit Summary       History of Present Illness:     Patient presents for a Medicare Wellness Visit    HPI   Patient Care Team:  Gilma Colin PA-C as PCP - General (Family Medicine)  Tyler De La Cruz MD as PCP - [address] [place] [address] (RTE)  [facility]  YENNY Fallon MD     Review of Systems:     Review of Systems     Problem List:     Patient Active Problem List   Diagnosis   • Allergic rhinitis due to pollen   • Arthritis   • Obesity   • Biceps tendinitis of left upper extremity   • Labral tear of long head of left biceps tendon   • Other age-related cataract      Past Medical and Surgical History:     Past Medical History:   Diagnosis Date   • Blepharitis of eyelid of left eye      Past Surgical History:   Procedure Laterality Date   • CATARACT EXTRACTION Bilateral    • DENTAL SURGERY      [place] teeth extraction   • HIP ARTHROPLASTY      Right   • HIP ARTHROSCOPY      Left   • TUBAL LIGATION        Family History:     Family History   Problem Relation Age of Onset   • Kidney cancer Mother    • Lung cancer Father    • No Known Problems Sister    • Mental illness Cousin    • No Known Problems Maternal Grandmother    • No Known Problems Maternal Grandfather    • No Known Problems Paternal Grandmother    • No Known Problems Paternal Grandfather    • Breast cancer Maternal Aunt         70's   • No Known Problems Maternal Aunt    • No Known Problems Maternal Aunt    • No Known Problems Maternal Aunt    • No Known Problems Maternal Aunt    • No Known Problems Maternal Aunt    • No Known Problems Paternal Aunt    • Substance Abuse Neg Hx    • Alcohol abuse Neg Hx       Social History:     Social History     Socioeconomic History   • Marital status: Single     Spouse name: None   • Number of children: None   • Years of education: None   • Highest education level: None   Occupational History   • None   Tobacco Use   • Smoking status: Never   • Smokeless tobacco: Never   Vaping Use   • Vaping Use: Never used   Substance and Sexual Activity   • Alcohol use: No   • Drug use: No   • Sexual activity: None   Other Topics Concern   • None   Social History Narrative    Always uses a seatbelt    Caffeine use- 1 cup of coffee per day    Has smoke detectors     Social Determinants of Health     Financial Resource Strain: Not on file   Food Insecurity: Not on file   Transportation Needs: Not on file   Physical Activity: Not on file   Stress: Not on file   Social Connections: Not on file   Intimate Partner Violence: Not on file   Housing Stability: Not on file      Medications and Allergies:     Current Outpatient Medications   Medication Sig Dispense Refill   • ascorbic acid (VITAMIN C) 500 mg tablet Take 500 mg by mouth daily     • Calcium Citrate-Vitamin D (CALCIUM CITRATE CHEWY BITE PO) Take by mouth     • Flaxseed, Linseed, (FLAXSEED OIL PO) Take by mouth     • loratadine (CLARITIN) 10 mg tablet Take 1 tablet (10 mg total) by mouth daily 30 tablet 0   • meloxicam (Mobic) 15 mg tablet Take 1 tablet (15 mg total) by mouth daily 30 tablet 5   • multivitamin (THERAGRAN) TABS Take 1 tablet by mouth daily     • olopatadine (PATANOL) 0 1 % ophthalmic solution 1 drop 2 (two) times a day     • Omega-3 Fatty Acids (FISH OIL) 500 MG CAPS Take by mouth 3 (three) times a day       No current facility-administered medications for this visit       Allergies   Allergen Reactions   • Dust Mite Extract Allergic Rhinitis Immunizations:     Immunization History   Administered Date(s) Administered   • COVID-19 PFIZER VACCINE 0 3 ML IM 04/05/2021, 04/30/2021   • INFLUENZA 11/12/2013   • Influenza Quadrivalent Preservative Free 3 years and older IM 10/24/2016   • Influenza Quadrivalent, 6-35 Months IM 09/05/2017   • Influenza, high dose seasonal 0 7 mL 10/08/2021, 10/13/2022   • Influenza, injectable, quadrivalent, preservative free 0 5 mL 09/30/2019, 08/26/2020   • Influenza, recombinant, quadrivalent,injectable, preservative free 10/01/2018   • Tdap 08/09/2016   • influenza, injectable, quadrivalent 10/24/2016      Health Maintenance:         Topic Date Due   • Breast Cancer Screening: Mammogram  11/23/2022   • DXA SCAN  11/12/2023   • Colorectal Cancer Screening  09/29/2025   • Hepatitis C Screening  Completed         Topic Date Due   • Hepatitis B Vaccine (1 of 3 - 3-dose series) Never done   • Pneumococcal Vaccine: 65+ Years (1 - PCV) Never done   • COVID-19 Vaccine (3 - Booster for Reynolds Peter series) 09/30/2021      Medicare Screening Tests and Risk Assessments:     Buffy Young is here for her Subsequent Wellness visit  Health Risk Assessment:   Patient rates overall health as excellent  Patient feels that their physical health rating is same  Patient is satisfied with their life  Eyesight was rated as same  Hearing was rated as same  Patient feels that their emotional and mental health rating is same  Patients states they are sometimes angry  Patient states they are sometimes unusually tired/fatigued  Pain experienced in the last 7 days has been some  Patient's pain rating has been 4/10  Patient states that she has experienced no weight loss or gain in last 6 months  Depression Screening:   PHQ-2 Score: 1      Fall Risk Screening: In the past year, patient has experienced: no history of falling in past year      Urinary Incontinence Screening:   Patient has not leaked urine accidently in the last six months       Home Safety:  Patient does not have trouble with stairs inside or outside of their home  Patient has working smoke alarms and has working carbon monoxide detector  Home safety hazards include: none  Nutrition:   Current diet is Regular and Limited junk food  Medications:   Patient is currently taking over-the-counter supplements  OTC medications include: see medication list  Patient is able to manage medications  Activities of Daily Living (ADLs)/Instrumental Activities of Daily Living (IADLs):   Walk and transfer into and out of bed and chair?: Yes  Dress and groom yourself?: Yes    Bathe or shower yourself?: Yes    Feed yourself?  Yes  Do your laundry/housekeeping?: Yes  Manage your money, pay your bills and track your expenses?: Yes  Make your own meals?: Yes    Do your own shopping?: Yes    Previous Hospitalizations:   Any hospitalizations or ED visits within the last 12 months?: No      Advance Care Planning:   Living will: No    Advanced directive: No    End of Life Decisions reviewed with patient: Yes    Provider agrees with end of life decisions: Yes      Cognitive Screening:   Provider or family/friend/caregiver concerned regarding cognition?: No    PREVENTIVE SCREENINGS      Cardiovascular Screening:    General: Screening Current and Risks and Benefits Discussed    Due for: Lipid Panel      Diabetes Screening:     General: Risks and Benefits Discussed    Due for: Blood Glucose      Colorectal Cancer Screening:     General: Screening Current      Breast Cancer Screening:     General: Screening Current and Risks and Benefits Discussed    Due for: Mammogram        Cervical Cancer Screening:    General: Screening Not Indicated      Osteoporosis Screening:    General: Screening Current and Risks and Benefits Discussed      Abdominal Aortic Aneurysm (AAA) Screening:        General: Screening Not Indicated      Lung Cancer Screening:     General: Screening Not Indicated      Hepatitis C Screening: General: Screening Current    Screening, Brief Intervention, and Referral to Treatment (SBIRT)    Screening  Typical number of drinks in a day: 0  Typical number of drinks in a week: 0  Interpretation: Low risk drinking behavior  Single Item Drug Screening:  How often have you used an illegal drug (including marijuana) or a prescription medication for non-medical reasons in the past year? never    Single Item Drug Screen Score: 0  Interpretation: Negative screen for possible drug use disorder    Brief Intervention  Alcohol & drug use screenings were reviewed  No concerns regarding substance use disorder identified  Other Counseling Topics:   Car/seat belt/driving safety, skin self-exam, sunscreen and calcium and vitamin D intake and regular weightbearing exercise  No results found  Physical Exam:     /78   Pulse 77   Resp (!) 6   Ht 5' 1" (1 549 m)   Wt 88 3 kg (194 lb 11 2 oz)   BMI 36 79 kg/m²     Physical Exam  Constitutional:       Appearance: Normal appearance  She is normal weight  HENT:      Head: Normocephalic and atraumatic  Cardiovascular:      Rate and Rhythm: Normal rate and regular rhythm  Pulses: Normal pulses  Heart sounds: Normal heart sounds  Pulmonary:      Effort: Pulmonary effort is normal       Breath sounds: Normal breath sounds  Abdominal:      General: Abdomen is flat  Bowel sounds are normal       Palpations: Abdomen is soft  Musculoskeletal:         General: Normal range of motion  Cervical back: Normal range of motion and neck supple  Right lower leg: No edema  Left lower leg: No edema  Skin:     General: Skin is warm  Neurological:      General: No focal deficit present  Mental Status: She is alert and oriented to person, place, and time  Psychiatric:         Mood and Affect: Mood normal          Behavior: Behavior normal          Thought Content:  Thought content normal          Judgment: Judgment normal  Kirsten Regalado PA-C     BMI Counseling: Body mass index is 36 79 kg/m²  The BMI is above normal  Nutrition recommendations include reducing portion sizes

## 2022-12-14 NOTE — PATIENT INSTRUCTIONS
Medicare Preventive Visit Patient Instructions  Thank you for completing your Welcome to Medicare Visit or Medicare Annual Wellness Visit today  Your next wellness visit will be due in one year (12/15/2023)  The screening/preventive services that you may require over the next 5-10 years are detailed below  Some tests may not apply to you based off risk factors and/or age  Screening tests ordered at today's visit but not completed yet may show as past due  Also, please note that scanned in results may not display below  Preventive Screenings:  Service Recommendations Previous Testing/Comments   Colorectal Cancer Screening  * Colonoscopy    * Fecal Occult Blood Test (FOBT)/Fecal Immunochemical Test (FIT)  * Fecal DNA/Cologuard Test  * Flexible Sigmoidoscopy Age: 39-70 years old   Colonoscopy: every 10 years (may be performed more frequently if at higher risk)  OR  FOBT/FIT: every 1 year  OR  Cologuard: every 3 years  OR  Sigmoidoscopy: every 5 years  Screening may be recommended earlier than age 39 if at higher risk for colorectal cancer  Also, an individualized decision between you and your healthcare provider will decide whether screening between the ages of 74-80 would be appropriate  Colonoscopy: 09/29/2015  FOBT/FIT: Not on file  Cologuard: Not on file  Sigmoidoscopy: Not on file    Screening Current     Breast Cancer Screening Age: 36 years old  Frequency: every 1-2 years  Not required if history of left and right mastectomy Mammogram: 11/23/2021    Screening Current   Cervical Cancer Screening Between the ages of 21-29, pap smear recommended once every 3 years  Between the ages of 33-67, can perform pap smear with HPV co-testing every 5 years     Recommendations may differ for women with a history of total hysterectomy, cervical cancer, or abnormal pap smears in past  Pap Smear: Not on file    Screening Not Indicated   Hepatitis C Screening Once for adults born between 1945 and 1965  More frequently in patients at high risk for Hepatitis C Hep C Antibody: 01/06/2012    Screening Current   Diabetes Screening 1-2 times per year if you're at risk for diabetes or have pre-diabetes Fasting glucose: 88 mg/dL (3/9/2021)  A1C: No results in last 5 years (No results in last 5 years)      Cholesterol Screening Once every 5 years if you don't have a lipid disorder  May order more often based on risk factors  Lipid panel: 03/09/2021    Screening Current     Other Preventive Screenings Covered by Medicare:  1  Abdominal Aortic Aneurysm (AAA) Screening: covered once if your at risk  You're considered to be at risk if you have a family history of AAA  2  Lung Cancer Screening: covers low dose CT scan once per year if you meet all of the following conditions: (1) Age 50-69; (2) No signs or symptoms of lung cancer; (3) Current smoker or have quit smoking within the last 15 years; (4) You have a tobacco smoking history of at least 20 pack years (packs per day multiplied by number of years you smoked); (5) You get a written order from a healthcare provider  3  Glaucoma Screening: covered annually if you're considered high risk: (1) You have diabetes OR (2) Family history of glaucoma OR (3)  aged 48 and older OR (3)  American aged 72 and older  3  Osteoporosis Screening: covered every 2 years if you meet one of the following conditions: (1) You're estrogen deficient and at risk for osteoporosis based off medical history and other findings; (2) Have a vertebral abnormality; (3) On glucocorticoid therapy for more than 3 months; (4) Have primary hyperparathyroidism; (5) On osteoporosis medications and need to assess response to drug therapy  · Last bone density test (DXA Scan): 11/12/2020   5  HIV Screening: covered annually if you're between the age of 15-65  Also covered annually if you are younger than 13 and older than 72 with risk factors for HIV infection   For pregnant patients, it is covered up to 3 times per pregnancy  Immunizations:  Immunization Recommendations   Influenza Vaccine Annual influenza vaccination during flu season is recommended for all persons aged >= 6 months who do not have contraindications   Pneumococcal Vaccine   * Pneumococcal conjugate vaccine = PCV13 (Prevnar 13), PCV15 (Vaxneuvance), PCV20 (Prevnar 20)  * Pneumococcal polysaccharide vaccine = PPSV23 (Pneumovax) Adults 25-60 years old: 1-3 doses may be recommended based on certain risk factors  Adults 72 years old: 1-2 doses may be recommended based off what pneumonia vaccine you previously received   Hepatitis B Vaccine 3 dose series if at intermediate or high risk (ex: diabetes, end stage renal disease, liver disease)   Tetanus (Td) Vaccine - COST NOT COVERED BY MEDICARE PART B Following completion of primary series, a booster dose should be given every 10 years to maintain immunity against tetanus  Td may also be given as tetanus wound prophylaxis  Tdap Vaccine - COST NOT COVERED BY MEDICARE PART B Recommended at least once for all adults  For pregnant patients, recommended with each pregnancy  Shingles Vaccine (Shingrix) - COST NOT COVERED BY MEDICARE PART B  2 shot series recommended in those aged 48 and above     Health Maintenance Due:      Topic Date Due   • Breast Cancer Screening: Mammogram  11/23/2022   • DXA SCAN  11/12/2023   • Colorectal Cancer Screening  09/29/2025   • Hepatitis C Screening  Completed     Immunizations Due:      Topic Date Due   • Hepatitis B Vaccine (1 of 3 - 3-dose series) Never done   • Pneumococcal Vaccine: 65+ Years (1 - PCV) Never done   • COVID-19 Vaccine (3 - Booster for Reynolds Peter series) 09/30/2021     Advance Directives   What are advance directives? Advance directives are legal documents that state your wishes and plans for medical care  These plans are made ahead of time in case you lose your ability to make decisions for yourself   Advance directives can apply to any medical decision, such as the treatments you want, and if you want to donate organs  What are the types of advance directives? There are many types of advance directives, and each state has rules about how to use them  You may choose a combination of any of the following:  · Living will: This is a written record of the treatment you want  You can also choose which treatments you do not want, which to limit, and which to stop at a certain time  This includes surgery, medicine, IV fluid, and tube feedings  · Durable power of  for healthcare [facility]): This is a written record that states who you want to make healthcare choices for you when you are unable to make them for yourself  This person, called a proxy, is usually a family member or a friend  You may choose more than 1 proxy  · Do not resuscitate (DNR) order:  A DNR order is used in case your heart stops beating or you stop breathing  It is a request not to have certain forms of treatment, such as CPR  A DNR order may be included in other types of advance directives  · Medical directive: This covers the care that you want if you are in a coma, near death, or unable to make decisions for yourself  You can list the treatments you want for each condition  Treatment may include pain medicine, surgery, blood transfusions, dialysis, IV or tube feedings, and a ventilator (breathing machine)  · Values history: This document has questions about your views, beliefs, and how you feel and think about life  This information can help others choose the care that you would choose  Why are advance directives important? An advance directive helps you control your care  Although spoken wishes may be used, it is better to have your wishes written down  Spoken wishes can be misunderstood, or not followed  Treatments may be given even if you do not want them  An advance directive may make it easier for your family to make difficult choices about your care     Weight Management   Why it is important to manage your weight:  Being overweight increases your risk of health conditions such as heart disease, high blood pressure, type 2 diabetes, and certain types of cancer  It can also increase your risk for osteoarthritis, sleep apnea, and other respiratory problems  Aim for a slow, steady weight loss  Even a small amount of weight loss can lower your risk of health problems  How to lose weight safely:  A safe and healthy way to lose weight is to eat fewer calories and get regular exercise  You can lose up about 1 pound a week by decreasing the number of calories you eat by 500 calories each day  Healthy meal plan for weight management:  A healthy meal plan includes a variety of foods, contains fewer calories, and helps you stay healthy  A healthy meal plan includes the following:  · Eat whole-grain foods more often  A healthy meal plan should contain fiber  Fiber is the part of grains, fruits, and vegetables that is not broken down by your body  Whole-grain foods are healthy and provide extra fiber in your diet  Some examples of whole-grain foods are whole-wheat breads and pastas, oatmeal, brown rice, and bulgur  · Eat a variety of vegetables every day  Include dark, leafy greens such as spinach, kale, mata greens, and mustard greens  Eat yellow and orange vegetables such as carrots, sweet potatoes, and winter squash  · Eat a variety of fruits every day  Choose fresh or canned fruit (canned in its own juice or light syrup) instead of juice  Fruit juice has very little or no fiber  · Eat low-fat dairy foods  Drink fat-free (skim) milk or 1% milk  Eat fat-free yogurt and low-fat cottage cheese  Try low-fat cheeses such as mozzarella and other reduced-fat cheeses  · Choose meat and other protein foods that are low in fat  Choose beans or other legumes such as split peas or lentils  Choose fish, skinless poultry (chicken or turkey), or lean cuts of red meat (beef or pork)   Before you cook meat or poultry, cut off any visible fat  · Use less fat and oil  Try baking foods instead of frying them  Add less fat, such as margarine, sour cream, regular salad dressing and mayonnaise to foods  Eat fewer high-fat foods  Some examples of high-fat foods include french fries, doughnuts, ice cream, and cakes  · Eat fewer sweets  Limit foods and drinks that are high in sugar  This includes candy, cookies, regular soda, and sweetened drinks  Exercise:  Exercise at least 30 minutes per day on most days of the week  Some examples of exercise include walking, biking, dancing, and swimming  You can also fit in more physical activity by taking the stairs instead of the elevator or parking farther away from stores  Ask your healthcare provider about the best exercise plan for you  © Copyright [employer] 2018 Information is for End User's use only and may not be sold, redistributed or otherwise used for commercial purposes   All illustrations and images included in CareNotes® are the copyrighted property of A D A M , Inc  or [address] [place] [address]

## 2022-12-28 ENCOUNTER — HOSPITAL ENCOUNTER (OUTPATIENT)
Dept: MAMMOGRAPHY | Facility: IMAGING CENTER | Age: 67
Discharge: HOME | End: 2022-12-28

## 2022-12-28 VITALS — HEIGHT: 61 IN | BODY MASS INDEX: 28.51 KG/M2 | WEIGHT: 151 LBS

## 2022-12-28 DIAGNOSIS — Z12.31 ENCOUNTER FOR SCREENING MAMMOGRAM FOR MALIGNANT NEOPLASM OF BREAST: ICD-10-CM

## 2023-03-06 ENCOUNTER — VBI (OUTPATIENT)
Dept: ADMINISTRATIVE | Facility: OTHER | Age: 68
End: 2023-03-06

## 2023-03-06 NOTE — TELEPHONE ENCOUNTER
03/06/23 1:57 PM     [employer] SmartForm used to document caregap status      Emmanuelle Burnette MA

## 2023-05-20 NOTE — PROGRESS NOTES
BMI Counseling: Body mass index is 35 48 kg/m²  The BMI is above normal  Nutrition recommendations include reducing portion sizes, decreasing overall calorie intake and 3-5 servings of fruits/vegetables daily  Exercise recommendations include moderate aerobic physical activity for 150 minutes/week  Assessment and Plan:     AWV conducted     Preventive health issues were discussed with patient, and age appropriate screening tests were ordered as noted in patient's After Visit Summary  Personalized health advice and appropriate referrals for health education or preventive services given if needed, as noted in patient's After Visit Summary       History of Present Illness:     Patient presents for Medicare Annual Wellness visit    Patient Care Team:  July Gonzalez PA-C as PCP - General (Family Medicine)  YENNY Owens MD     Problem List:     Patient Active Problem List   Diagnosis    Allergic rhinitis due to pollen    Arthritis    Obesity    Biceps tendinitis of left upper extremity    Labral tear of long head of left biceps tendon      Past Medical and Surgical History:     Past Medical History:   Diagnosis Date    Blepharitis of eyelid of left eye      Past Surgical History:   Procedure Laterality Date    DENTAL SURGERY      [place] teeth extraction    HIP ARTHROPLASTY      Right    HIP ARTHROSCOPY      Left    TUBAL LIGATION        Family History:     Family History   Problem Relation Age of Onset    Kidney cancer Mother     Lung cancer Father     No Known Problems Sister     Mental illness Cousin     No Known Problems Maternal Grandmother     No Known Problems Maternal Grandfather     No Known Problems Paternal Grandmother     No Known Problems Paternal Grandfather     Breast cancer Maternal Aunt     No Known Problems Maternal Aunt     No Known Problems Maternal Aunt     No Known Problems Maternal Aunt     No Known Problems Maternal Aunt     No Known Problems Maternal Aunt     No Known Problems Paternal Aunt     Substance Abuse Neg Hx     Alcohol abuse Neg Hx       Social History:     Social History     Socioeconomic History    Marital status: Single     Spouse name: None    Number of children: None    Years of education: None    Highest education level: None   Occupational History    None   Social Needs    Financial resource strain: None    Food insecurity:     Worry: None     Inability: None    Transportation needs:     Medical: None     Non-medical: None   Tobacco Use    Smoking status: Never Smoker    Smokeless tobacco: Never Used   Substance and Sexual Activity    Alcohol use: No    Drug use: No    Sexual activity: None   Lifestyle    Physical activity:     Days per week: None     Minutes per session: None    Stress: None   Relationships    Social connections:     Talks on phone: None     Gets together: None     Attends Orthodox service: None     Active member of club or organization: None     Attends meetings of clubs or organizations: None     Relationship status: None    Intimate partner violence:     Fear of current or ex partner: None     Emotionally abused: None     Physically abused: None     Forced sexual activity: None   Other Topics Concern    None   Social History Narrative    Always uses a seatbelt    Caffeine use- 1 cup of coffee per day    Has smoke detectors       Medications and Allergies:     Current Outpatient Medications   Medication Sig Dispense Refill    ascorbic acid (VITAMIN C) 500 mg tablet Take 500 mg by mouth daily      Cholecalciferol (VITAMIN D-3) 1000 units CAPS Take by mouth daily      loratadine (CLARITIN) 10 mg tablet Take 1 tablet (10 mg total) by mouth daily 30 tablet 0    multivitamin (THERAGRAN) TABS Take 1 tablet by mouth daily      Omega-3 Fatty Acids (FISH OIL) 500 MG CAPS Take by mouth 3 (three) times a day       No current facility-administered medications for this visit        No Known Allergies Immunizations:     Immunization History   Administered Date(s) Administered    INFLUENZA 11/12/2013    Influenza Quadrivalent, 6-35 Months IM 09/05/2017    Influenza, injectable, quadrivalent, preservative free 0 5 mL 09/30/2019    Influenza, recombinant, quadrivalent,injectable, preservative free 10/01/2018    Tdap 08/09/2016    influenza, injectable, quadrivalent 10/24/2016      Health Maintenance:         Topic Date Due    DXA SCAN  1955    Cervical Cancer Screening  08/09/2018    MAMMOGRAM  09/30/2020    CRC Screening: Colonoscopy  09/29/2025    Hepatitis C Screening  Completed     There are no preventive care reminders to display for this patient  Medicare Health Risk Assessment:     /76 (BP Location: Left arm, Patient Position: Sitting, Cuff Size: Standard)   Pulse 72   Temp 98 4 °F (36 9 °C) (Oral)   Resp 14   Ht 5' 1" (1 549 m)   Wt 85 2 kg (187 lb 12 8 oz)   BMI 35 48 kg/m²          Health Risk Assessment:   Patient rates overall health as good  Patient feels that their physical health rating is same  Eyesight was rated as same  Hearing was rated as same  Patient feels that their emotional and mental health rating is same  Pain experienced in the last 7 days has been some  Patient's pain rating has been 6/10  Patient states that she has experienced no weight loss or gain in last 6 months  Fall Risk Screening: In the past year, patient has experienced: no history of falling in past year      Urinary Incontinence Screening:   Patient has not leaked urine accidently in the last six months  Home Safety:  Patient does not have trouble with stairs inside or outside of their home  Patient has working smoke alarms and has working carbon monoxide detector  Home safety hazards include: none  Nutrition:   Current diet is Regular  Medications:   Patient is not currently taking any over-the-counter supplements  Patient is able to manage medications       Activities of Daily Living (ADLs)/Instrumental Activities of Daily Living (IADLs):   Walk and transfer into and out of bed and chair?: Yes  Dress and groom yourself?: Yes    Bathe or shower yourself?: Yes    Feed yourself?  Yes  Do your laundry/housekeeping?: Yes  Manage your money, pay your bills and track your expenses?: Yes  Make your own meals?: Yes    Do your own shopping?: Yes    Previous Hospitalizations:   Any hospitalizations or ED visits within the last 12 months?: No      Advance Care Planning:   Living will: No    End of Life Decisions reviewed with patient: Yes      Cognitive Screening:   Provider or family/friend/caregiver concerned regarding cognition?: No    PREVENTIVE SCREENINGS      Cardiovascular Screening:      Due for: Lipid Panel      Diabetes Screening:       Due for: Blood Glucose      Colorectal Cancer Screening:     General: Screening Current      Breast Cancer Screening:     General: Screening Current      Cervical Cancer Screening:      Due for: Cervical Pap Smear      Abdominal Aortic Aneurysm (AAA) Screening:        General: Screening Not Indicated      Lung Cancer Screening:     General: Screening Not Indicated      Hepatitis C Screening:    General: Screening Current      Santiago Cowart PA-C Chart(s)/Patient

## 2023-05-22 ENCOUNTER — TELEPHONE (OUTPATIENT)
Dept: FAMILY MEDICINE CLINIC | Facility: CLINIC | Age: 68
End: 2023-05-22

## 2023-05-22 NOTE — TELEPHONE ENCOUNTER
Patient is asking if you will excuse her for jury duty because of the back pain   Patient made apt for back pain

## 2023-05-24 ENCOUNTER — OFFICE VISIT (OUTPATIENT)
Dept: FAMILY MEDICINE CLINIC | Facility: CLINIC | Age: 68
End: 2023-05-24

## 2023-05-24 VITALS
BODY MASS INDEX: 34.85 KG/M2 | SYSTOLIC BLOOD PRESSURE: 100 MMHG | OXYGEN SATURATION: 96 % | HEIGHT: 61 IN | WEIGHT: 184.6 LBS | HEART RATE: 84 BPM | RESPIRATION RATE: 16 BRPM | DIASTOLIC BLOOD PRESSURE: 70 MMHG

## 2023-05-24 DIAGNOSIS — M54.42 ACUTE BILATERAL LOW BACK PAIN WITH LEFT-SIDED SCIATICA: Primary | ICD-10-CM

## 2023-05-24 DIAGNOSIS — M51.36 DEGENERATIVE DISC DISEASE, LUMBAR: ICD-10-CM

## 2023-05-24 NOTE — LETTER
Date: 5/24/2023    To whom it may concern: This is to certify that Darren Loomis has been under my care for the following diagnosis: chronic degenerative disc disease lumbar spine  I feel that Darren Loomis is unable to serve on [address] [place] [address] Duty at this time for the above mentioned medical reasons due to currently the pain is limiting her ability to walk and sit for prolong periods of time  She is in chronic pain  She has been referred to physical therapy for evaluation and treatment  She has been having flares of this pain off and on since last year                    Sincerely,  Gerry Garcia PA-C

## 2023-05-24 NOTE — PROGRESS NOTES
Assessment/Plan:    1  DDD lumbar spine - XR from 9/2022 shows moderate disease, will refer to PT at this time, encouraged to take tylenol and if activity causes discomfort do not do it    F/u as needed  F/u as scheduled        Subjective:   Chief Complaint   Patient presents with   • Back Pain     Past few months   Wants to discuss note for jury duty as well\  Pain scale 5/10      Patient ID: Caitlin Jaramillo is a 78 y o  female  Patient with low back pain for 3-4 weeks, taking tylenol with no relief  Going up and down the spine  Continues to go to the gym and aggravate it more  Swimming helps  Had xrays done back in 9/2022, never pursuded PT at advised  Feels she cannot attend jury duty due to discomfort with prolong sitting or standing  Looks for excuse        The following portions of the patient's history were reviewed and updated as appropriate: allergies, current medications, past family history, past medical history, past social history, past surgical history and problem list     Past Medical History:   Diagnosis Date   • Blepharitis of eyelid of left eye      Past Surgical History:   Procedure Laterality Date   • CATARACT EXTRACTION Bilateral    • DENTAL SURGERY      [place] teeth extraction   • HIP ARTHROPLASTY      Right   • HIP ARTHROSCOPY      Left   • TUBAL LIGATION       Family History   Problem Relation Age of Onset   • Kidney cancer Mother    • Lung cancer Father    • No Known Problems Sister    • No Known Problems Maternal Grandmother    • No Known Problems Maternal Grandfather    • No Known Problems Paternal Grandmother    • No Known Problems Paternal Grandfather    • Breast cancer Maternal Aunt         70's   • No Known Problems Maternal Aunt    • No Known Problems Maternal Aunt    • No Known Problems Maternal Aunt    • No Known Problems Maternal Aunt    • No Known Problems Maternal Aunt    • No Known Problems Paternal Aunt    • Mental illness Cousin    • Substance Abuse Neg Hx    • Alcohol abuse Neg Hx      Social History     Socioeconomic History   • Marital status: Single     Spouse name: Not on file   • Number of children: Not on file   • Years of education: Not on file   • Highest education level: Not on file   Occupational History   • Not on file   Tobacco Use   • Smoking status: Never   • Smokeless tobacco: Never   Vaping Use   • Vaping Use: Never used   Substance and Sexual Activity   • Alcohol use: No   • Drug use: No   • Sexual activity: Not on file   Other Topics Concern   • Not on file   Social History Narrative    Always uses a seatbelt    Caffeine use- 1 cup of coffee per day    Has smoke detectors     Social Determinants of Health     Financial Resource Strain: Low Risk  (12/14/2022)    Overall Financial Resource Strain (CARDIA)    • Difficulty of Paying Living Expenses: Not hard at all   Food Insecurity: Not on file   Transportation Needs: No Transportation Needs (12/14/2022)    PRAPARE - Transportation    • Lack of Transportation (Medical): No    • Lack of Transportation (Non-Medical):  No   Physical Activity: Not on file   Stress: Not on file   Social Connections: Not on file   Intimate Partner Violence: Not on file   Housing Stability: Not on file       Current Outpatient Medications:   •  ascorbic acid (VITAMIN C) 500 mg tablet, Take 500 mg by mouth daily, Disp: , Rfl:   •  Calcium Citrate-Vitamin D (CALCIUM CITRATE CHEWY BITE PO), Take by mouth, Disp: , Rfl:   •  Flaxseed, Linseed, (FLAXSEED OIL PO), Take by mouth, Disp: , Rfl:   •  loratadine (CLARITIN) 10 mg tablet, Take 1 tablet (10 mg total) by mouth daily, Disp: 30 tablet, Rfl: 0  •  meloxicam (Mobic) 15 mg tablet, Take 1 tablet (15 mg total) by mouth daily, Disp: 30 tablet, Rfl: 5  •  multivitamin (THERAGRAN) TABS, Take 1 tablet by mouth daily, Disp: , Rfl:   •  olopatadine (PATANOL) 0 1 % ophthalmic solution, 1 drop 2 (two) times a day, Disp: , Rfl:   •  Omega-3 Fatty Acids (FISH OIL) 500 MG CAPS, Take by mouth 3 (three) times a "day, Disp: , Rfl:     Review of Systems          Objective:    Vitals:    05/24/23 1335   BP: 100/70   Pulse: 84   Resp: 16   SpO2: 96%   Weight: 83 7 kg (184 lb 9 6 oz)   Height: 5' 1\" (1 549 m)        Physical Exam  Constitutional:       Appearance: Normal appearance  She is well-developed  HENT:      Head: Normocephalic and atraumatic  Cardiovascular:      Rate and Rhythm: Normal rate and regular rhythm  Pulses: Normal pulses  Heart sounds: Normal heart sounds  Pulmonary:      Effort: Pulmonary effort is normal       Breath sounds: Normal breath sounds  Musculoskeletal:      Cervical back: Normal range of motion and neck supple  Comments: Walking limited due to pain, limited ROM due to pain   Skin:     General: Skin is warm  Neurological:      General: No focal deficit present  Mental Status: She is alert and oriented to person, place, and time  Psychiatric:         Mood and Affect: Mood normal          Behavior: Behavior normal          Thought Content:  Thought content normal          Judgment: Judgment normal                "

## 2023-06-02 ENCOUNTER — EVALUATION (OUTPATIENT)
Dept: PHYSICAL THERAPY | Facility: CLINIC | Age: 68
End: 2023-06-02

## 2023-06-02 DIAGNOSIS — M54.50 LUMBAR SPINE PAIN: Primary | ICD-10-CM

## 2023-06-02 DIAGNOSIS — M51.36 DEGENERATIVE DISC DISEASE, LUMBAR: ICD-10-CM

## 2023-06-02 DIAGNOSIS — M54.42 ACUTE BILATERAL LOW BACK PAIN WITH LEFT-SIDED SCIATICA: ICD-10-CM

## 2023-06-02 NOTE — PROGRESS NOTES
PT Evaluation     Today's date: 2023  Patient name: Roni Sheffield  : 1955  MRN: [id number]  Referring provider: ALEXX Adams  Dx:   Encounter Diagnosis     ICD-10-CM    1  Lumbar spine pain  M54 50       2  Acute bilateral low back pain with left-sided sciatica  M54 42 Ambulatory Referral to Physical Therapy      3  Degenerative disc disease, lumbar  M51 36 Ambulatory Referral to Physical Therapy                     Assessment  Assessment details: Pt is a 78 y o  female who presents to outpatient PT with pain, decreased rom, decreased strength and decreased functional mobility  She will benefit from skilled PT to address these deficits in order to achieve her goals and maximize her functional mobility  Goals  Short Term Goals: Independent performance of initial hep  Decrease pain 2 points on VAS      Long Term Goals: Independent performance of comprehensive hep  Performance of IADL's is returned to max level of function  Performance in recreational activities is improved to max level of function  Able to squat to floor with good form and min pain      Plan  Planned therapy interventions: abdominal trunk stabilization, IADL retraining, joint mobilization, strengthening, stretching, therapeutic activities, therapeutic exercise and home exercise program  Frequency: 2x week  Duration in weeks: 6        Subjective Evaluation    History of Present Illness  Mechanism of injury: Pt reports that she has been suffering from chronic back pain for >1year but in the past several weeks she has been experiencing an exacerbation of pain  She is unable to identify a trigger for this  Reports that she likes to work out at [employer] but she has had to cut back secondary to pain  Pt reports that he pain seems to be traveling up her back  Denies radicular symptoms  Reports that she take Tylenol arthritis with min pain reduction    Reports that she will experience occasionally disturbances at night from pain  Reports that she has difficulty squatting to the floor to pick something up secondary to pain and weakness  X-ray from 2023 revealed multi-level DDD      Pain  Current pain ratin  At best pain rating: 3  At worst pain ratin    Patient Goals  Patient goals for therapy: decreased pain, increased motion, increased strength, independence with ADLs/IADLs and return to sport/leisure activities          Objective       Lumbar spine:    ROM:   Flexion: min limited   Extension: max limited   Right Rotation: mod limited   Left Rotation: mod limited   Right Lateral Flexion: max limited   Left Lateral Flexion:  Max limited      Sit with rounded shoulder, forward and posterior tilt  Poor control of abdominals noted with TaA  Hypomobility noted with spring testing, t/o lumbar and thoracic spine  Straight Leg Raise neg  Cross SLR:  neg  Slump Test:  neg  Prone Knee Bend:  neg  Directional testing: Stiffness reported with repeated flexion and extension, no sig change in symptoms  Jose Angel's sign: pos  Prone instability test: pos             Dermatome testing: intact to light touch B/L                   Precautions: DDD, B/L TRIP      Manuals             Thoracic pa's                                                    Neuro Re-Ed             Pelvic rocking             TaA bridge             TaA rows             TaA lpd             TaA wall squats                                       Ther Ex             Prone on elbow             pball flexion stretch 3-way             Open book stretch             Thoracic extension stretch                                                                 Ther Activity             bike                          Gait Training                                       Modalities

## 2023-06-05 ENCOUNTER — OFFICE VISIT (OUTPATIENT)
Dept: PHYSICAL THERAPY | Facility: CLINIC | Age: 68
End: 2023-06-05
Payer: COMMERCIAL

## 2023-06-05 DIAGNOSIS — M54.42 ACUTE BILATERAL LOW BACK PAIN WITH LEFT-SIDED SCIATICA: Primary | ICD-10-CM

## 2023-06-05 DIAGNOSIS — M51.36 DEGENERATIVE DISC DISEASE, LUMBAR: ICD-10-CM

## 2023-06-05 DIAGNOSIS — M54.50 LUMBAR SPINE PAIN: ICD-10-CM

## 2023-06-05 PROCEDURE — 97112 NEUROMUSCULAR REEDUCATION: CPT

## 2023-06-05 PROCEDURE — 97110 THERAPEUTIC EXERCISES: CPT

## 2023-06-05 NOTE — PROGRESS NOTES
"Daily Note     Today's date: 2023  Patient name: Nya Arreola  : 1955  MRN: [id number]  Referring provider: Samreen Cabrales*  Dx:   Encounter Diagnosis     ICD-10-CM    1  Acute bilateral low back pain with left-sided sciatica  M54 42       2  Degenerative disc disease, lumbar  M51 36       3  Lumbar spine pain  M54 50           Start Time: 1700  Stop Time: 1744  Total time in clinic (min): 44 minutes    Subjective: Patient notes mod pain at T-S/ L-S junction  Patient states that during self guided pool therapy back relaxed but tightened once out of pool  Objective: See treatment diary below      Assessment: Patient tolerated initial treatment after IE fair  Session initiated on bike for LE strength  Pa's performed by DPT of T-S/L-S junction  Moderate limited mobility palpated by DPT at junction, but improved post  Hand over hand tactile cues utilized for correct performance of activities  Patient would benefit from continued PT  Plan: Continue per plan of care  Consider using MHP during bike warm up       Precautions: DDD, B/L TRIP      Manuals  6/5           Thoracic pa's  KL                                                  Neuro Re-Ed             Pelvic rocking  LTR 20, 3\"           TaA bridge  NV           TaA rows  NV           TaA lpd  NV           TaA wall squats                                       Ther Ex             Prone on elbow  3'           pball flexion stretch 3-way             Open book stretch  10x5\" self ea    5 ea with OP           Thoracic extension stretch  10\"x10                                                               Ther Activity             bike  8 min                        Gait Training                                       Modalities                                            "

## 2023-06-06 ENCOUNTER — APPOINTMENT (OUTPATIENT)
Dept: PHYSICAL THERAPY | Facility: CLINIC | Age: 68
End: 2023-06-06
Payer: COMMERCIAL

## 2023-06-08 ENCOUNTER — OFFICE VISIT (OUTPATIENT)
Dept: PHYSICAL THERAPY | Facility: CLINIC | Age: 68
End: 2023-06-08
Payer: COMMERCIAL

## 2023-06-08 DIAGNOSIS — M54.42 ACUTE BILATERAL LOW BACK PAIN WITH LEFT-SIDED SCIATICA: Primary | ICD-10-CM

## 2023-06-08 PROCEDURE — 97112 NEUROMUSCULAR REEDUCATION: CPT | Performed by: PHYSICAL THERAPIST

## 2023-06-08 PROCEDURE — 97110 THERAPEUTIC EXERCISES: CPT | Performed by: PHYSICAL THERAPIST

## 2023-06-08 NOTE — PROGRESS NOTES
"Daily Note     Today's date: 2023  Patient name: Bria Evangelista  : 1955  MRN: [id number]  Referring provider: Whit Mckinnon*  Dx:   Encounter Diagnosis     ICD-10-CM    1  Acute bilateral low back pain with left-sided sciatica  M54 42                      Subjective: pt reports no sig soreness after her LV but that she is sore upon arrival to PT secondary to participating in aqua aerobics earlier today  Reports that she notices her back \"cracking more\" but this is not painful  Objective: See treatment diary below      Assessment: progressed therex as listed with no complaints other then fatigue  She require occasinal verbal and tactile cues for form with most therex  Progress as janet NV  Plan: Continue per plan of care         Precautions: DDD, B/L TRIP      Manuals            Thoracic pa's  KL kl                                                 Neuro Re-Ed             Pelvic rocking  LTR 20, 3\"           TaA bridge  NV 5\"x20          TaA rows  NV           TaA lpd  NV Green tb 5\"x20          TaA wall squats                                       Ther Ex             Prone on elbow  3' 2'          pball flexion stretch 3-way             Open book stretch  10x5\" self ea    5 ea with OP 15\"x4ea          Thoracic extension stretch  10\"x10 10\"x10                                                              Ther Activity             bike  8 min 10'                       Gait Training                                       Modalities                                            "

## 2023-06-14 ENCOUNTER — OFFICE VISIT (OUTPATIENT)
Dept: PHYSICAL THERAPY | Facility: CLINIC | Age: 68
End: 2023-06-14
Payer: COMMERCIAL

## 2023-06-14 DIAGNOSIS — M54.42 ACUTE BILATERAL LOW BACK PAIN WITH LEFT-SIDED SCIATICA: Primary | ICD-10-CM

## 2023-06-14 PROCEDURE — 97110 THERAPEUTIC EXERCISES: CPT | Performed by: PHYSICAL THERAPIST

## 2023-06-14 PROCEDURE — 97112 NEUROMUSCULAR REEDUCATION: CPT | Performed by: PHYSICAL THERAPIST

## 2023-06-14 NOTE — PROGRESS NOTES
"Daily Note     Today's date: 2023  Patient name: Rose Langford  : 1955  MRN: [id number]  Referring provider: Delray Costain, Blanchie Paget*  Dx:   Encounter Diagnosis     ICD-10-CM    1  Acute bilateral low back pain with left-sided sciatica  M54 42                      Subjective: pt reports soreness for approx 30 min after her LV but then this resolved  Reports soreness yesterday that she attributes to the rainy weather but is feeling better today  Objective: See treatment diary below      Assessment: good tolerance to manual tx  Pt continues to require verbal cues for form with most therex  Reports mild R hip pain t/o tx, pt has hx of B/L TRIP    Plan: Continue per plan of care         Precautions: DDD, B/L TRIP      Manuals           Thoracic pa's  KL kl kl                                                Neuro Re-Ed             Pelvic rocking  LTR 20, 3\"           TaA bridge  NV 5\"x20 5\"x20         TaA rows  NV           TaA lpd  NV Green tb 5\"x20 Green x20         TaA wall squats    5\"x20                                   Ther Ex             Prone on elbow  3' 2' 2'         pball flexion stretch 3-way             Open book stretch  10x5\" self ea    5 ea with OP 15\"x4ea 15\"x4         Thoracic extension stretch  10\"x10 10\"x10 10\"x10                                                             Ther Activity             bike  8 min 10'                       Gait Training                                       Modalities                                            "

## 2023-06-16 ENCOUNTER — OFFICE VISIT (OUTPATIENT)
Dept: PHYSICAL THERAPY | Facility: CLINIC | Age: 68
End: 2023-06-16
Payer: COMMERCIAL

## 2023-06-16 DIAGNOSIS — M54.42 ACUTE BILATERAL LOW BACK PAIN WITH LEFT-SIDED SCIATICA: Primary | ICD-10-CM

## 2023-06-16 DIAGNOSIS — M51.36 DEGENERATIVE DISC DISEASE, LUMBAR: ICD-10-CM

## 2023-06-16 PROCEDURE — 97110 THERAPEUTIC EXERCISES: CPT

## 2023-06-16 PROCEDURE — 97530 THERAPEUTIC ACTIVITIES: CPT

## 2023-06-16 PROCEDURE — 97112 NEUROMUSCULAR REEDUCATION: CPT

## 2023-06-16 NOTE — PROGRESS NOTES
"Daily Note     Today's date: 2023  Patient name: Shiraz Torre  : 1955  MRN: [id number]  Referring provider: Sharon Guzman*  Dx:   Encounter Diagnosis     ICD-10-CM    1  Acute bilateral low back pain with left-sided sciatica  M54 42       2  Degenerative disc disease, lumbar  M51 36           Start Time: 1115  Stop Time: 1157  Total time in clinic (min): 42 minutes       Subjective: Patient reports she worked out this morning at the [facility] - reports she has recently been lifting lighter weights in order to avoid increasing back pain  Patient reports she takes extra strength Tylenol for back pain  Objective: See treatment diary below  Assessment: Therapeutic exercise program is tolerated well without complaints  Verbal cues required for proper squat technique  Plan: Continue treatment as per PT plan of care         Precautions: DDD, B/L TRIP      Manuals          Thoracic pa's  KL kl kl KL                                               Neuro Re-Ed             Pelvic rocking  LTR 20, 3\"           TaA bridge  NV 5\"x20 5\"x20 5\"x20        TaA rows  NV           TaA lpd  NV Green tb 5\"x20 Green x20 green  20        TaA wall squats    5\"x20 5\"x20                                  Ther Ex             Prone on elbow  3' 2' 2' 2'        pball flexion stretch 3-way     fwd  10\"x10        Open book stretch  10x5\" self ea    5 ea with OP 15\"x4ea 15\"x4 15\"x4        Thoracic extension stretch  10\"x10 10\"x10 10\"x10 10\"x10                                                            Ther Activity             bike  8 min 10'  8'                     Gait Training                                       Modalities                                            "

## 2023-06-21 ENCOUNTER — OFFICE VISIT (OUTPATIENT)
Dept: PHYSICAL THERAPY | Facility: CLINIC | Age: 68
End: 2023-06-21
Payer: COMMERCIAL

## 2023-06-21 DIAGNOSIS — M54.42 ACUTE BILATERAL LOW BACK PAIN WITH LEFT-SIDED SCIATICA: Primary | ICD-10-CM

## 2023-06-21 DIAGNOSIS — M51.36 DEGENERATIVE DISC DISEASE, LUMBAR: ICD-10-CM

## 2023-06-21 DIAGNOSIS — M54.50 LUMBAR SPINE PAIN: ICD-10-CM

## 2023-06-21 PROCEDURE — 97112 NEUROMUSCULAR REEDUCATION: CPT

## 2023-06-21 PROCEDURE — 97140 MANUAL THERAPY 1/> REGIONS: CPT

## 2023-06-21 PROCEDURE — 97110 THERAPEUTIC EXERCISES: CPT

## 2023-06-21 NOTE — PROGRESS NOTES
"Daily Note     Today's date: 2023  Patient name: Suzi Hayes  : 1955  MRN: [id number]  Referring provider: Kayla Menendez*  Dx:   Encounter Diagnosis     ICD-10-CM    1  Acute bilateral low back pain with left-sided sciatica  M54 42       2  Degenerative disc disease, lumbar  M51 36       3  Lumbar spine pain  M54 50           Start Time: 1403  Stop Time: 1445  Total time in clinic (min): 42 minutes      Subjective: Patient reports she continues to exercise at the [facility] 5 times per week doing weight training and water aerobics  Patient reports her back has been feeling okay  Patient reports she is taking extra strength Tylenol and Omega 3  Objective: See treatment diary below  Assessment: Therapeutic exercise program is tolerated well  Patient would benefit from continued thoracic spine mobilization to improve her mobility  Plan: Continue treatment as per PT plan of care         Precautions: DDD, B/L TRIP      Manuals         Thoracic pa's  KL kl kl KL        TPR thoracic paraspinals      JLW                                 Neuro Re-Ed             Pelvic rocking  LTR 20, 3\"           TaA bridge  NV 5\"x20 5\"x20 5\"x20 5\"x20       TaA rows  NV           TaA lpd  NV Green tb 5\"x20 Green x20 green  20 green  20       TaA wall squats    5\"x20 5\"x20 5\"x20                                 Ther Ex             Prone on elbow  3' 2' 2' 2' 2'       pball flexion stretch 3-way     fwd  10\"x10 fwd  10\"x10       Open book stretch  10x5\" self ea    5 ea with OP 15\"x4ea 15\"x4 15\"x4 15\"x4       Thoracic extension stretch  10\"x10 10\"x10 10\"x10 10\"x10 10\"x10                                                           Ther Activity             bike  8 min 10'  8' 8'                    Gait Training                                       Modalities                                            "

## 2023-06-23 ENCOUNTER — OFFICE VISIT (OUTPATIENT)
Dept: PHYSICAL THERAPY | Facility: CLINIC | Age: 68
End: 2023-06-23
Payer: COMMERCIAL

## 2023-06-23 DIAGNOSIS — M54.50 LUMBAR SPINE PAIN: ICD-10-CM

## 2023-06-23 DIAGNOSIS — M51.36 DEGENERATIVE DISC DISEASE, LUMBAR: ICD-10-CM

## 2023-06-23 DIAGNOSIS — M54.42 ACUTE BILATERAL LOW BACK PAIN WITH LEFT-SIDED SCIATICA: Primary | ICD-10-CM

## 2023-06-23 PROCEDURE — 97112 NEUROMUSCULAR REEDUCATION: CPT

## 2023-06-23 PROCEDURE — 97110 THERAPEUTIC EXERCISES: CPT

## 2023-06-23 NOTE — PROGRESS NOTES
"Daily Note     Today's date: 2023  Patient name: Rolando Mcrae  : 1955  MRN: [id number]  Referring provider: Reena Meyers*  Dx:   Encounter Diagnosis     ICD-10-CM    1  Acute bilateral low back pain with left-sided sciatica  M54 42       2  Degenerative disc disease, lumbar  M51 36       3  Lumbar spine pain  M54 50                        Subjective: Severe soreness following last session  Required her to modify her workouts but is feeling \" somewhat better \" today  Objective: See treatment diary below  Assessment: Visible discomfort with transfers and bed mobility today  Resumed T-S mobilizations with more relief than she had with TPR last session  Plan: Continue treatment as per PT plan of care         Precautions: DDD, B/L TRIP      Manuals        Thoracic pa's  KL kl kl KL  KL      TPR thoracic paraspinals      JLW                                 Neuro Re-Ed             Pelvic rocking  LTR 20, 3\"           TaA bridge  NV 5\"x20 5\"x20 5\"x20 5\"x20 5\"x20      TaA rows  NV           TaA lpd  NV Green tb 5\"x20 Green x20 green  20 green  20 Green  2x10      TaA wall squats    5\"x20 5\"x20 5\"x20 5\"x20                                Ther Ex             Prone on elbow  3' 2' 2' 2' 2' 2'      pball flexion stretch 3-way     fwd  10\"x10 fwd  10\"x10 Fwd   10\"x10      Open book stretch  10x5\" self ea    5 ea with OP 15\"x4ea 15\"x4 15\"x4 15\"x4 15\"x4       Thoracic extension stretch  10\"x10 10\"x10 10\"x10 10\"x10 10\"x10 10\"x10                                                          Ther Activity             bike  8 min 10'  8' 8' 8'                   Gait Training                                       Modalities                                            "

## 2023-06-28 ENCOUNTER — OFFICE VISIT (OUTPATIENT)
Dept: PHYSICAL THERAPY | Facility: CLINIC | Age: 68
End: 2023-06-28
Payer: COMMERCIAL

## 2023-06-28 DIAGNOSIS — M54.42 ACUTE BILATERAL LOW BACK PAIN WITH LEFT-SIDED SCIATICA: Primary | ICD-10-CM

## 2023-06-28 DIAGNOSIS — M51.36 DEGENERATIVE DISC DISEASE, LUMBAR: ICD-10-CM

## 2023-06-28 DIAGNOSIS — M54.50 LUMBAR SPINE PAIN: ICD-10-CM

## 2023-06-28 PROCEDURE — 97530 THERAPEUTIC ACTIVITIES: CPT

## 2023-06-28 PROCEDURE — 97110 THERAPEUTIC EXERCISES: CPT

## 2023-06-28 PROCEDURE — 97112 NEUROMUSCULAR REEDUCATION: CPT

## 2023-06-28 NOTE — PROGRESS NOTES
"Daily Note     Today's date: 2023  Patient name: Yola Liang  : 1955  MRN: [id number]  Referring provider: Estefania Perez*  Dx:   Encounter Diagnosis     ICD-10-CM    1  Acute bilateral low back pain with left-sided sciatica  M54 42       2  Degenerative disc disease, lumbar  M51 36       3  Lumbar spine pain  M54 50           Start Time: 1405  Stop Time: 1445  Total time in clinic (min): 40 minutes       Subjective: Patient reports her back is feeling \"pretty good  \"  Patient reports she exercised at the [facility] this morning  Objective: See treatment diary below  Assessment: Therapeutic exercise program is tolerated well without complaints  Patient has good tolerance to additional prone hip extension  Plan: Continue treatment as per PT plan of care          Precautions: DDD, B/L TRIP      Manuals       Thoracic pa's  KL kl kl KL  KL MD     TPR thoracic paraspinals      JLW                                 Neuro Re-Ed             Pelvic rocking  LTR 20, 3\"           TaA bridge  NV 5\"x20 5\"x20 5\"x20 5\"x20 5\"x20 5\"x20     TaA rows  NV           TaA lpd  NV Green tb 5\"x20 Green x20 green  20 green  20 Green  2x10 green  20     TaA wall squats    5\"x20 5\"x20 5\"x20 5\"x20 5\"x20                               Ther Ex             Prone on elbow  3' 2' 2' 2' 2' 2' 2'     pball flexion stretch 3-way     fwd  10\"x10 fwd  10\"x10 Fwd   10\"x10 fwd  10\"x10     Open book stretch  10x5\" self ea    5 ea with OP 15\"x4ea 15\"x4 15\"x4 15\"x4 15\"x4  15\"x4     Thoracic extension stretch  10\"x10 10\"x10 10\"x10 10\"x10 10\"x10 10\"x10 10\"x10     prone hip ext        10 ea                                            Ther Activity             bike  8 min 10'  8' 8' 8' 8'                  Gait Training                                       Modalities                                            "

## 2023-06-30 ENCOUNTER — OFFICE VISIT (OUTPATIENT)
Dept: PHYSICAL THERAPY | Facility: CLINIC | Age: 68
End: 2023-06-30
Payer: COMMERCIAL

## 2023-06-30 DIAGNOSIS — M54.50 LUMBAR SPINE PAIN: ICD-10-CM

## 2023-06-30 DIAGNOSIS — M51.36 DEGENERATIVE DISC DISEASE, LUMBAR: ICD-10-CM

## 2023-06-30 DIAGNOSIS — M54.42 ACUTE BILATERAL LOW BACK PAIN WITH LEFT-SIDED SCIATICA: Primary | ICD-10-CM

## 2023-06-30 PROCEDURE — 97112 NEUROMUSCULAR REEDUCATION: CPT

## 2023-06-30 PROCEDURE — 97110 THERAPEUTIC EXERCISES: CPT

## 2023-06-30 NOTE — PROGRESS NOTES
"Daily Note     Today's date: 2023  Patient name: Rick León  : 1955  MRN: [id number]  Referring provider: Monisha Loco*  Dx:   Encounter Diagnosis     ICD-10-CM    1  Acute bilateral low back pain with left-sided sciatica  M54 42       2  Degenerative disc disease, lumbar  M51 36       3  Lumbar spine pain  M54 50           Start Time: 1345  Stop Time: 1430  Total time in clinic (min): 45 minutes       Subjective: Patient reports no pain in her back on arrival  She states she did her exercises at the [facility] this morning with some low back aggravations but is okay now  Objective: See treatment diary below  Assessment: Patient had good tolerance with Therapeutic exercise program without new complaints or concerns  Cuing was given throughout exercise performance for proper form and elimination of compensations  She noted appropriate muscle fatigue and stretch response throughout exercise performances  Patient left in good condition  Plan: Continue treatment as per PT plan of care          Precautions: DDD, B/L TRIP      Manuals      Thoracic pa's  KL kl kl KL  KL MD     TPR thoracic paraspinals      JLW   KL                              Neuro Re-Ed             Pelvic rocking  LTR 20, 3\"           TaA bridge  NV 5\"x20 5\"x20 5\"x20 5\"x20 5\"x20 5\"x20 5\"x20    TaA rows  NV           TaA lpd  NV Green tb 5\"x20 Green x20 green  20 green  20 Green  2x10 green  20 green  20    TaA wall squats    5\"x20 5\"x20 5\"x20 5\"x20 5\"x20 5\"x20                              Ther Ex             Prone on elbow  3' 2' 2' 2' 2' 2' 2' 2'    pball flexion stretch 3-way     fwd  10\"x10 fwd  10\"x10 Fwd   10\"x10 fwd  10\"x10 fwd  10\"x10    Open book stretch  10x5\" self ea    5 ea with OP 15\"x4ea 15\"x4 15\"x4 15\"x4 15\"x4  15\"x4 15\"x4    Thoracic extension stretch  10\"x10 10\"x10 10\"x10 10\"x10 10\"x10 10\"x10 10\"x10 10\"x10    prone hip ext        10 ea 10x ea                       " Ther Activity       6/23      bike  8 min 10'  8' 8' 8' 8' 8'                 Gait Training                                       Modalities

## 2023-07-10 ENCOUNTER — OFFICE VISIT (OUTPATIENT)
Dept: PHYSICAL THERAPY | Facility: CLINIC | Age: 68
End: 2023-07-10
Payer: COMMERCIAL

## 2023-07-10 DIAGNOSIS — M54.42 ACUTE BILATERAL LOW BACK PAIN WITH LEFT-SIDED SCIATICA: Primary | ICD-10-CM

## 2023-07-10 PROCEDURE — 97140 MANUAL THERAPY 1/> REGIONS: CPT

## 2023-07-10 PROCEDURE — 97110 THERAPEUTIC EXERCISES: CPT

## 2023-07-10 PROCEDURE — 97112 NEUROMUSCULAR REEDUCATION: CPT

## 2023-07-10 NOTE — PROGRESS NOTES
Daily Note     Today's date: 7/10/2023  Patient name: Ivania Dumont  : 1955  MRN: [id number]  Referring provider: Lurdes Danielle*  Dx:   Encounter Diagnosis     ICD-10-CM    1. Acute bilateral low back pain with left-sided sciatica  M54.42           Start Time: 1145  Stop Time: 1230  Total time in clinic (min): 45 minutes      Subjective: No significant changes to report. Objective: See treatment diary below. Assessment: Therapeutic exercise program is tolerated well without complaints. Tenderness noted during TPR. Plan: Continue treatment as per PT plan of care.        Precautions: DDD, B/L TRIP      Manuals  6/5 6/8 6/14 6/16 6/21 6/23 6/28 6/30 7/10   Thoracic pa's  KL kl kl KL  KL MD     TPR thoracic paraspinals      JLW   KL JLW                             Neuro Re-Ed             Pelvic rocking  LTR 20, 3"           TaA bridge  NV 5"x20 5"x20 5"x20 5"x20 5"x20 5"x20 5"x20 5"x20   TaA rows  NV           TaA lpd  NV Green tb 5"x20 Green x20 green  20 green  20 Green  2x10 green  20 green  20 green  30   TaA wall squats    5"x20 5"x20 5"x20 5"x20 5"x20 5"x20 5"x20                             Ther Ex             Prone on elbow  3' 2' 2' 2' 2' 2' 2' 2' 2'   pball flexion stretch 3-way     fwd  10"x10 fwd  10"x10 Fwd   10"x10 fwd  10"x10 fwd  10"x10 fwd  10"x10   Open book stretch  10x5" self ea    5 ea with OP 15"x4ea 15"x4 15"x4 15"x4 15"x4  15"x4 15"x4 15"x4   Thoracic extension stretch  10"x10 10"x10 10"x10 10"x10 10"x10 10"x10 10"x10 10"x10 10"x10   prone hip ext        10 ea 10x ea 20 ea                                          Ther Activity             bike  8 min 10'  8' 8' 8' 8' 8' 8'                Gait Training                                       Modalities

## 2023-07-14 ENCOUNTER — EVALUATION (OUTPATIENT)
Dept: PHYSICAL THERAPY | Facility: CLINIC | Age: 68
End: 2023-07-14
Payer: COMMERCIAL

## 2023-07-14 DIAGNOSIS — M54.42 ACUTE BILATERAL LOW BACK PAIN WITH LEFT-SIDED SCIATICA: Primary | ICD-10-CM

## 2023-07-14 DIAGNOSIS — M51.36 DEGENERATIVE DISC DISEASE, LUMBAR: ICD-10-CM

## 2023-07-14 PROCEDURE — 97530 THERAPEUTIC ACTIVITIES: CPT | Performed by: PHYSICAL THERAPIST

## 2023-07-14 PROCEDURE — 97112 NEUROMUSCULAR REEDUCATION: CPT | Performed by: PHYSICAL THERAPIST

## 2023-07-14 PROCEDURE — 97110 THERAPEUTIC EXERCISES: CPT | Performed by: PHYSICAL THERAPIST

## 2023-07-14 NOTE — PROGRESS NOTES
Daily Note/DC summary    Today's date: 2023  Patient name: Benjamin Wiley  : 1955  MRN: [id number]  Referring provider: Nicolás Jacobs*  Dx:   Encounter Diagnosis     ICD-10-CM    1. Acute bilateral low back pain with left-sided sciatica  M54.42       2. Degenerative disc disease, lumbar  M51.36                        Subjective: pt reports no sig changes since starting PT. Reports compliance with her hep       Objective: See treatment diary below. Assessment: pt demonstrates good form with all therex. Plan: DC to HEP secondary to lack of progress. She is encouraged to maintain her hep and to call the clinic if she experiences any sig changes.          Precautions: DDD, B/L TRIP      Manuals  6/5 6/8 6/14 6/16 6/21 6/23 6/28 6/30 7/10 7/14   Thoracic pa's  KL kl kl KL  KL MD      TPR thoracic paraspinals      JLW   KL JLW kl                               Neuro Re-Ed              Pelvic rocking  LTR 20, 3"            TaA bridge  NV 5"x20 5"x20 5"x20 5"x20 5"x20 5"x20 5"x20 5"x20 5"x20   TaA rows  NV            TaA lpd  NV Green tb 5"x20 Green x20 green  20 green  20 Green  2x10 green  20 green  20 green  30 Green x30   TaA wall squats    5"x20 5"x20 5"x20 5"x20 5"x20 5"x20 5"x20 5"x20                               Ther Ex              Prone on elbow  3' 2' 2' 2' 2' 2' 2' 2' 2'    pball flexion stretch 3-way     fwd  10"x10 fwd  10"x10 Fwd   10"x10 fwd  10"x10 fwd  10"x10 fwd  10"x10 10"x10   Open book stretch  10x5" self ea    5 ea with OP 15"x4ea 15"x4 15"x4 15"x4 15"x4  15"x4 15"x4 15"x4 15"x4   Thoracic extension stretch  10"x10 10"x10 10"x10 10"x10 10"x10 10"x10 10"x10 10"x10 10"x10 10"x10   prone hip ext        10 ea 10x ea 20 ea x20                                             Ther Activity              bike  8 min 10'  8' 8' 8' 8' 8' 8' 8'                 Gait Training                                          Modalities

## 2023-07-18 ENCOUNTER — APPOINTMENT (OUTPATIENT)
Dept: PHYSICAL THERAPY | Facility: CLINIC | Age: 68
End: 2023-07-18
Payer: COMMERCIAL

## 2023-07-21 ENCOUNTER — APPOINTMENT (OUTPATIENT)
Dept: PHYSICAL THERAPY | Facility: CLINIC | Age: 68
End: 2023-07-21
Payer: COMMERCIAL

## 2023-08-22 ENCOUNTER — APPOINTMENT (OUTPATIENT)
Dept: RADIOLOGY | Facility: CLINIC | Age: 68
End: 2023-08-22
Payer: COMMERCIAL

## 2023-08-22 ENCOUNTER — OFFICE VISIT (OUTPATIENT)
Dept: FAMILY MEDICINE CLINIC | Facility: CLINIC | Age: 68
End: 2023-08-22
Payer: COMMERCIAL

## 2023-08-22 VITALS
BODY MASS INDEX: 34.93 KG/M2 | TEMPERATURE: 97.5 F | WEIGHT: 185 LBS | SYSTOLIC BLOOD PRESSURE: 108 MMHG | HEIGHT: 61 IN | OXYGEN SATURATION: 98 % | RESPIRATION RATE: 16 BRPM | DIASTOLIC BLOOD PRESSURE: 70 MMHG | HEART RATE: 70 BPM

## 2023-08-22 DIAGNOSIS — M54.6 CHRONIC MIDLINE THORACIC BACK PAIN: Primary | ICD-10-CM

## 2023-08-22 DIAGNOSIS — M54.6 CHRONIC MIDLINE THORACIC BACK PAIN: ICD-10-CM

## 2023-08-22 DIAGNOSIS — G89.29 CHRONIC MIDLINE THORACIC BACK PAIN: Primary | ICD-10-CM

## 2023-08-22 DIAGNOSIS — G89.29 CHRONIC MIDLINE THORACIC BACK PAIN: ICD-10-CM

## 2023-08-22 DIAGNOSIS — M54.50 LUMBAR BACK PAIN: ICD-10-CM

## 2023-08-22 PROCEDURE — 99214 OFFICE O/P EST MOD 30 MIN: CPT | Performed by: PHYSICIAN ASSISTANT

## 2023-08-22 PROCEDURE — 72110 X-RAY EXAM L-2 SPINE 4/>VWS: CPT

## 2023-08-22 PROCEDURE — 72072 X-RAY EXAM THORAC SPINE 3VWS: CPT

## 2023-08-22 NOTE — PROGRESS NOTES
Assessment/Plan:    1. Chronic back pain - will order XR thoracic, lumbar spine XR. Consider MRI and Pain management, patient was d/c from PT due to lack of progress, patient has continues HEP with no success    F/u as needed    Subjective:   Chief Complaint   Patient presents with   • Back Pain     Worsening the last few months  Worsened by exercise  Radiating to shoulders  Would like x-rays      Patient ID: Mitchell Husain is a 80 y.o. female. Patient here for follow up. Was seeing PT for low back pain, was discharged in July and told to continue exercises at home. But back is not better, getting worse. Worse with exercise, sitting, laying. Notes pain in in upper back under bra line then with walking will go down to lower back. Pain is sharp. Denies radiation down legs. Notes hips and legs feel "fine". No weakness. Constant ache. Taking omega, Tylenol, mobic with no relief. Sometimes laying on couch will help. Per PT - patient DC to HEP secondary to lack of progress. She was encouraged to maintain her hep.       The following portions of the patient's history were reviewed and updated as appropriate: allergies, current medications, past family history, past medical history, past social history, past surgical history and problem list.    Past Medical History:   Diagnosis Date   • Blepharitis of eyelid of left eye      Past Surgical History:   Procedure Laterality Date   • CATARACT EXTRACTION Bilateral    • DENTAL SURGERY      [place] teeth extraction   • HIP ARTHROPLASTY      Right   • HIP ARTHROSCOPY      Left   • TUBAL LIGATION       Family History   Problem Relation Age of Onset   • Kidney cancer Mother    • Lung cancer Father    • No Known Problems Sister    • No Known Problems Maternal Grandmother    • No Known Problems Maternal Grandfather    • No Known Problems Paternal Grandmother    • No Known Problems Paternal Grandfather    • Breast cancer Maternal Aunt         70's   • No Known Problems Maternal Aunt    • No Known Problems Maternal Aunt    • No Known Problems Maternal Aunt    • No Known Problems Maternal Aunt    • No Known Problems Maternal Aunt    • No Known Problems Paternal Aunt    • Mental illness Cousin    • Substance Abuse Neg Hx    • Alcohol abuse Neg Hx      Social History     Socioeconomic History   • Marital status: Single     Spouse name: Not on file   • Number of children: Not on file   • Years of education: Not on file   • Highest education level: Not on file   Occupational History   • Not on file   Tobacco Use   • Smoking status: Never   • Smokeless tobacco: Never   Vaping Use   • Vaping Use: Never used   Substance and Sexual Activity   • Alcohol use: No   • Drug use: No   • Sexual activity: Not on file   Other Topics Concern   • Not on file   Social History Narrative    Always uses a seatbelt    Caffeine use- 1 cup of coffee per day    Has smoke detectors     Social Determinants of Health     Financial Resource Strain: Low Risk  (12/14/2022)    Overall Financial Resource Strain (CARDIA)    • Difficulty of Paying Living Expenses: Not hard at all   Food Insecurity: Not on file   Transportation Needs: No Transportation Needs (12/14/2022)    PRAPARE - Transportation    • Lack of Transportation (Medical): No    • Lack of Transportation (Non-Medical):  No   Physical Activity: Not on file   Stress: Not on file   Social Connections: Not on file   Intimate Partner Violence: Not on file   Housing Stability: Not on file       Current Outpatient Medications:   •  ascorbic acid (VITAMIN C) 500 mg tablet, Take 500 mg by mouth daily, Disp: , Rfl:   •  Calcium Citrate-Vitamin D (CALCIUM CITRATE CHEWY BITE PO), Take by mouth, Disp: , Rfl:   •  Flaxseed, Linseed, (FLAXSEED OIL PO), Take by mouth, Disp: , Rfl:   •  loratadine (CLARITIN) 10 mg tablet, Take 1 tablet (10 mg total) by mouth daily, Disp: 30 tablet, Rfl: 0  •  multivitamin (THERAGRAN) TABS, Take 1 tablet by mouth daily, Disp: , Rfl:   •  olopatadine (PATANOL) 0.1 % ophthalmic solution, 1 drop 2 (two) times a day, Disp: , Rfl:   •  Omega-3 Fatty Acids (FISH OIL) 500 MG CAPS, Take by mouth 3 (three) times a day, Disp: , Rfl:   •  meloxicam (Mobic) 15 mg tablet, Take 1 tablet (15 mg total) by mouth daily (Patient not taking: Reported on 8/22/2023), Disp: 30 tablet, Rfl: 5    Review of Systems          Objective:    Vitals:    08/22/23 1111   BP: 108/70   Pulse: 70   Resp: 16   Temp: 97.5 °F (36.4 °C)   TempSrc: Temporal   SpO2: 98%   Weight: 83.9 kg (185 lb)   Height: 5' 1" (1.549 m)        Physical Exam  Constitutional:       Appearance: Normal appearance. HENT:      Head: Normocephalic and atraumatic. Musculoskeletal:         General: Normal range of motion. Comments: Thoracic and lumbar spine tender to palpation, spinal muscles with spasm. Limited rom due to discomfort   Skin:     General: Skin is warm. Neurological:      General: No focal deficit present. Mental Status: She is alert and oriented to person, place, and time. Psychiatric:         Mood and Affect: Mood normal.         Behavior: Behavior normal.         Thought Content:  Thought content normal.         Judgment: Judgment normal.

## 2023-08-22 NOTE — PROGRESS NOTES
Daily Note     Today's date: 11/15/2019  Patient name: Florian Montiel  : 1955  MRN: [id number]  Referring provider: Jocelyn Horn PA-C  Dx:   Encounter Diagnosis     ICD-10-CM    1  Thoracic spine pain M54 6                 Subjective: Pt reports that she is having more pain today that she attributes to having a busy day at work  Objective: See treatment diary below  Assessment: Good pain reduction noted with manual tx and modalities  No increase in pain with therex    Plan: Continue treatment as per PT plan of care       Precautions: OA    Manual  11/6 11/7 11/13 11/15         Thoracic pa's 4' 4' KL NP kl         DTM L scap retractors 8' 8' 8' 8'                                                    Exercise Diary   11/7 11/13 11/15         ube retro  6' 6' with MHP 4'/4' with MHP         pulleys  5"x20 5"x20 5"x20         pball flexion stretch  10"x10 10"x10 10"x10         Doorway pec stretch  30"x3 30"x3 30"x3         Chin tuck supine  5"x10 5"x10 5"x10         scap protraction stretch             rows  green  2x10 green  2x10 Green tb x20         Low rows  green  2x10 green  2x10 Green tb x20         TaA  5"x10 5"x10 5"x10         TaA bridge  2x10 2x10 20                                                                                                                                               Modalities              [facility] Progress Note - Behavioral Health   Asif Love 29 y.o. female MRN: [id number]  Unit/Bed#: [address] 998-51 Encounter: [id number]    Assessment/Plan   Principal Problem:    Psychosis ([address]) rule out schizophrenia versus schizoaffective disorder  Active Problems:    Medical clearance for psychiatric admission    Mild tetrahydrocannabinol (THC) abuse      Recommended Treatment:   Continue Zyprexa Zydis 25 mg qHS, mouth checks  Continue Melatonin 6 mg qHS for insomnia   Continue Senokot S 2 tablets BID for bowel regimen  Continue Vitamin D2 50,000 IU for supplementation    Continue with group therapy, milieu therapy and occupational therapy. Continue frequent safety checks and vitals per unit protocol. Case discussed with treatment team.  Risks, benefits and possible side effects of Medications: Risks, benefits, and possible side effects of medications have been explained to the patient, who verbalizes understanding    Current Legal Status: 303  Disposition: TBD, Coordinating with CM  ------------------------------------------------------------    Subjective: Per nursing report, Manasa Ashby has been visible, cooperative on the unit and compliant with scheduled medications. Patient reported there was blood coming out of her nose when sneezing but per RN, no further further symptoms were present. Today, Manasa Ashby was seen and evaluated alongside attending physician. On evaluation, patient is less superficial, brighter and more reactive, less inappropriate smiling. She reports she is "trying to be better" this morning and clarifies she is attempting to do so via being more "calm and exercise". Patient states she slept "horrible" and reports getting an estimated 4 hours sleep overnight with frequent awakenings and difficulty falling back asleep. Patient reports she was doing "a lot of thinking" about where to go and who to talk to after discharge, which was in part keeping her up.   She describes her energy levels as "70%" and is trying to get it to "%" later in the day by doing more walking. She states a goal of walking 2 miles today. Reports additional goals of going to groups, drinking more water. She states she enjoys seeing new faces and hearing other stories during the groups. Patient reports an adequate appetite for breakfast this morning. She reports tolerating her current medication regimen well and denies any medication side effects at this time. She reports "I think I am improving myself here day by day" and focusing on herself instead of using her phone and being on social media. Patient denies SI/HI/AVH at this time. Progress Toward Goals: slow improvement    Psychiatric Review of Systems:  Behavior over the last 24 hours: improving slowly  Sleep: decreased  Appetite: adequate  Medication side effects: none verbalized  ROS: Complete review of systems is negative except as noted above.     Vital signs in last 24 hours:   Temp:  [97.1 °F (36.2 °C)-97.3 °F (36.3 °C)] 97.3 °F (36.3 °C)  HR:  [78-92] 92  Resp:  [16] 16  BP: (112-126)/(56-63) 126/56    Mental Status Exam:  Appearance:  alert, good eye contact, appears stated age, marginal grooming/hygiene and dressed in hospital attire, no acute distress, unkempt hair    Behavior:  calm, less superficial, cooperative and sitting comfortably   Motor: no abnormal movements and normal gait and balance   Speech:  spontaneous, clear, normal rate, normal volume and coherent   Mood:  "trying to be better"   Affect:  less inappropriate smiling, brighter and more reactive   Thought Process:  less disorganized, less circumstantial   Thought Content: Less overt paranoid ideation   Perceptual disturbances: no reported hallucinations and does not appear to be responding to internal stimuli at this time; less internally preoccupied   Risk Potential: No active or passive suicidal or homicidal ideation was verbalized during interview   Cognition: oriented to self and situation, appears to be of average intelligence and cognition not formally tested   Insight:  Limited   Judgment: Limited     Current Medications:  Current Facility-Administered Medications   Medication Dose Route Frequency Provider Last Rate   • acetaminophen  650 mg Oral Q6H PRN Juvencio Varela MD     • acetaminophen  650 mg Oral Q4H PRN Juvencio Varela MD     • acetaminophen  975 mg Oral Q6H PRN Juvencio Varela MD     • aluminum-magnesium hydroxide-simethicone  30 mL Oral Q4H PRN Juvencio Varela MD     • haloperidol lactate  2.5 mg Intramuscular Q4H PRN Max 4/day Juvencio Varela MD      And   • LORazepam  1 mg Intramuscular Q4H PRN Max 4/day Juvencio Varela MD      And   • benztropine  0.5 mg Intramuscular Q4H PRN Max 4/day Juvencio Varela MD     • haloperidol lactate  5 mg Intramuscular Q4H PRN Max 4/day Juvencio Varela MD      And   • LORazepam  2 mg Intramuscular Q4H PRN Max 4/day Juvencio Varela MD      And   • benztropine  1 mg Intramuscular Q4H PRN Max 4/day Juvencio Varela MD     • benztropine  1 mg Oral Q4H PRN Max 6/day Juvencio Varela MD     • bisacodyl  10 mg Rectal Daily PRN Juvencio Varela MD     • hydrOXYzine HCL  50 mg Oral Q6H PRN Max 4/day Juvencio Varela MD      Or   • diphenhydrAMINE  50 mg Intramuscular Q6H PRN Juvencio Varela MD     • ergocalciferol  50,000 Units Oral Weekly MARY Arzola     • haloperidol  1 mg Oral Q6H PRN Juvencio Varela MD     • haloperidol  2.5 mg Oral Q4H PRN Max 4/day Juvencio Varela MD     • haloperidol  5 mg Oral Q4H PRN Max 4/day Juvencio Varela MD     • hydrOXYzine HCL  100 mg Oral Q6H PRN Max 4/day Juvencio Varela MD      Or   • LORazepam  2 mg Intramuscular Q6H PRN Juvencio Varela MD     • hydrOXYzine HCL  25 mg Oral Q6H PRN Max 4/day Juvencio Varela MD     • melatonin  6 mg Oral HS Clemencia Art DO     • OLANZapine  25 mg Oral HS Clemencia Art DO     • polyethylene glycol  17 g Oral Daily PRN Juvencio Varela MD     • senna-docusate sodium  1 tablet Oral Daily PRN Raul Hutchison MD     • senna-docusate sodium  2 tablet Oral BID Doris Dent MD     • traZODone  100 mg Oral HS PRN Georgia C Holter, DO         Behavioral Health Medications: all current active meds have been reviewed. Changes as in plan section above. Laboratory results:  I have personally reviewed all pertinent laboratory/tests results. No results found for this or any previous visit (from the past 48 hour(s)).      Timothy Curiel DO

## 2023-08-29 ENCOUNTER — TELEPHONE (OUTPATIENT)
Dept: FAMILY MEDICINE CLINIC | Facility: CLINIC | Age: 68
End: 2023-08-29

## 2023-09-01 ENCOUNTER — TELEPHONE (OUTPATIENT)
Dept: OTHER | Facility: OTHER | Age: 68
End: 2023-09-01

## 2023-09-01 NOTE — TELEPHONE ENCOUNTER
Pt called wanting to know the results from her x-rays on 8/22. Please call patient when office reopens.

## 2023-09-05 ENCOUNTER — TELEPHONE (OUTPATIENT)
Dept: FAMILY MEDICINE CLINIC | Facility: CLINIC | Age: 68
End: 2023-09-05

## 2023-09-27 ENCOUNTER — CONSULT (OUTPATIENT)
Dept: PAIN MEDICINE | Facility: CLINIC | Age: 68
End: 2023-09-27
Payer: COMMERCIAL

## 2023-09-27 VITALS
WEIGHT: 188 LBS | SYSTOLIC BLOOD PRESSURE: 135 MMHG | TEMPERATURE: 98 F | BODY MASS INDEX: 35.5 KG/M2 | HEIGHT: 61 IN | DIASTOLIC BLOOD PRESSURE: 80 MMHG

## 2023-09-27 DIAGNOSIS — M54.40 CHRONIC BILATERAL LOW BACK PAIN WITH SCIATICA, SCIATICA LATERALITY UNSPECIFIED: Primary | ICD-10-CM

## 2023-09-27 DIAGNOSIS — G89.29 CHRONIC BILATERAL LOW BACK PAIN WITH SCIATICA, SCIATICA LATERALITY UNSPECIFIED: Primary | ICD-10-CM

## 2023-09-27 DIAGNOSIS — M79.18 MYOFASCIAL PAIN SYNDROME: ICD-10-CM

## 2023-09-27 PROCEDURE — 99205 OFFICE O/P NEW HI 60 MIN: CPT | Performed by: ANESTHESIOLOGY

## 2023-09-27 NOTE — PROGRESS NOTES
Assessment  1. Chronic bilateral low back pain with sciatica, sciatica laterality unspecified    2. Myofascial pain syndrome        Plan      At this point the patient's pain persists despite time, relative rest, activity modification, and nonsteroidal anti-inflammatories. Her pain is significantly interfering with her daily living activities. She has undergone physical therapy. It is appropriate to order an MRI of the lumbar spine to rule out any significant etiology of her symptoms. Recommended a titrating dose of oral prednisone she defers at this time. I recommend chiropractic treatment at [facility] for myofascial release, she defers at this time but I believe there is a great deal of myofascial pain superimposed on discogenic issues. She may benefit from future trigger point injections. Once we obtain MRI results, I will follow-up with the patient, review the results and current symptoms, and discuss the next steps of the treatment plan. My impressions and treatment recommendations were discussed in detail with the patient who verbalized understanding and had no further questions. Discharge instructions were provided. I personally saw and examined the patient and I agree with the above discussed plan of care. This note is created using dictation transcription. It may contain typographical errors, grammatical errors, improperly dictated words, background noise and other errors. Orders Placed This Encounter   Procedures   • MRI lumbar spine without contrast     Standing Status:   Future     Standing Expiration Date:   9/27/2027     Scheduling Instructions: There is no preparation for this test. Please leave your jewelry and valuables at home, wedding rings are the exception. All patients will be required to change into a hospital gown and pants. Street clothes are not permitted in the MRI.   Magnetic nail polish must be removed prior to arrival for your test. Please bring your insurance cards, a form of photo ID and a list of your medications with you. Arrive 15 minutes prior to your appointment time in order to register. Please bring any prior CT or MRI studies of this area that were not performed at a [facility]. To schedule this appointment, please contact Central Scheduling at [phone]. Prior to your appointment, please make sure you complete the MRI Screening Form when you e-Check in for your appointment. This will be available starting 7 days before your appointment in [address] [place] [address]. You may receive an e-mail with an activation code if you do not have a [employer] account. If you do not have access to a device, we will complete your screening at your appointment. Order Specific Question:   What is the patient's sedation requirement? If Medication for Claustrophobia is selected, order medication at this point. Answer:   No Sedation     Order Specific Question:   Does this procedure require the 3T MRI at [facility] or [place]?     Answer:   No     Order Specific Question:   Release to patient through [employer]     Answer:   Immediate     Order Specific Question:   Is order priority selected as STAT? Answer:   No     Order Specific Question:   Reason for Exam (FREE TEXT)     Answer:   low back pain         History of Present Illness    Ela Redd is a 80 y.o. female with greater than 2-year history of low back and occasional lower extremity and mid back pain. She is unaware of any clear present event denies any trauma or injury. Pain is moderate rates as 8 out of 10 the visual analog scale is intermittent but worse in the morning. Described as sharp with no weakness. She reports that walking increases her symptoms. She underwent physical therapy without significant relief and is currently doing water therapy at the [facility]. Denies any bowel bladder dysfunction.     I have personally reviewed and/or updated the patient's past medical history, past surgical history, family history, social history, current medications, allergies, and vital signs today. Review of Systems   Respiratory: Positive for shortness of breath. Cardiovascular: Positive for chest pain. Gastrointestinal: Negative for constipation, diarrhea, nausea and vomiting. Musculoskeletal: Positive for arthralgias, back pain and myalgias. Negative for gait problem and joint swelling. Skin: Negative for rash. Neurological: Negative for dizziness, seizures and weakness. All other systems reviewed and are negative.       Patient Active Problem List   Diagnosis   • Allergic rhinitis due to pollen   • Arthritis   • Obesity   • Biceps tendinitis of left upper extremity   • Labral tear of long head of left biceps tendon   • Other age-related cataract       Past Medical History:   Diagnosis Date   • Blepharitis of eyelid of left eye        Past Surgical History:   Procedure Laterality Date   • CATARACT EXTRACTION Bilateral    • DENTAL SURGERY      [place] teeth extraction   • HIP ARTHROPLASTY      Right   • HIP ARTHROSCOPY      Left   • TUBAL LIGATION         Family History   Problem Relation Age of Onset   • Kidney cancer Mother    • Lung cancer Father    • No Known Problems Sister    • No Known Problems Maternal Grandmother    • No Known Problems Maternal Grandfather    • No Known Problems Paternal Grandmother    • No Known Problems Paternal Grandfather    • Breast cancer Maternal Aunt         70's   • No Known Problems Maternal Aunt    • No Known Problems Maternal Aunt    • No Known Problems Maternal Aunt    • No Known Problems Maternal Aunt    • No Known Problems Maternal Aunt    • No Known Problems Paternal Aunt    • Mental illness Cousin    • Substance Abuse Neg Hx    • Alcohol abuse Neg Hx        Social History     Occupational History   • Not on file   Tobacco Use   • Smoking status: Never   • Smokeless tobacco: Never   Vaping Use   • Vaping Use: Never used   Substance and Sexual Activity   • Alcohol use: No   • Drug use: No   • Sexual activity: Not on file       Current Outpatient Medications on File Prior to Visit   Medication Sig   • ascorbic acid (VITAMIN C) 500 mg tablet Take 500 mg by mouth daily   • Calcium Citrate-Vitamin D (CALCIUM CITRATE CHEWY BITE PO) Take by mouth   • Flaxseed, Linseed, (FLAXSEED OIL PO) Take by mouth   • loratadine (CLARITIN) 10 mg tablet Take 1 tablet (10 mg total) by mouth daily   • multivitamin (THERAGRAN) TABS Take 1 tablet by mouth daily   • olopatadine (PATANOL) 0.1 % ophthalmic solution 1 drop 2 (two) times a day   • Omega-3 Fatty Acids (FISH OIL) 500 MG CAPS Take by mouth 3 (three) times a day     No current facility-administered medications on file prior to visit. Allergies   Allergen Reactions   • Dust Mite Extract Allergic Rhinitis       Physical Exam    /80 (BP Location: Left arm, Patient Position: Sitting, Cuff Size: Adult)   Temp 98 °F (36.7 °C)   Ht 5' 1" (1.549 m) Comment: verbal  Wt 85.3 kg (188 lb)   BMI 35.52 kg/m²     Constitutional: normal, well developed, well nourished, alert, in no distress and non-toxic and no overt pain behavior. and obese  Eyes: anicteric  HEENT: grossly intact  Neck: supple, symmetric, trachea midline and no masses   Pulmonary:even and unlabored  Cardiovascular:No edema or pitting edema present  Skin:Normal without rashes or lesions and well hydrated  Psychiatric:Mood and affect appropriate  Neurologic:Cranial Nerves II-XII grossly intact  Musculoskeletal:normal, difficulty going from sitting to standing sitting position; no obvious skin lesions or erythema lumbar sacral spine; mild tenderness in lumbar paravertebrals, no sacroiliac or greater trochanteric tenderness bilateral; deep tendon reflexes are diminished but symmetrical bilateral patellar and achilles; no focal motor deficit appreciated lower limbs; negative bilateral straight leg raising.     Imaging  [facility] THORACIC SPINE 08-22-23 @ [facility]     INDICATION:   M54.6: Pain in thoracic spine  G89.29: Other chronic pain.     COMPARISON:  None     VIEWS:  XR SPINE THORACIC 3 VW        FINDINGS:     There is no fracture or pathologic bone lesion.     Thoracic vertebral alignment is within normal limits.     Thoracic spondylosis.     There is no displacement of the paraspinal line.     The pedicles appear intact.     IMPRESSION:     No acute osseous abnormality. XRAY LUMBAR SPINE 08-22-23 @ [facility]     INDICATION:   M54.50: Low back pain, unspecified.     COMPARISON: 9/15/2022     VIEWS:  XR SPINE LUMBAR MINIMUM 4 VIEWS NON INJURY        FINDINGS:     There are 5 non rib bearing lumbar vertebral bodies.     There is no evidence of acute fracture or destructive osseous lesion.     L1-L2 through L3-L4 slight retrolisthesis. L4-L5 mild degenerative spondylolisthesis. Anatomic alignment otherwise.     L1-L2 moderate/marked degenerative disc change. L4-L5, L5-S1 mild disc space narrowing.     The pedicles appear intact.     Bilateral hip total arthroplasties     IMPRESSION:     No acute osseous abnormality.     Degenerative changes        XRAY LUMBAR SPINE 09-15-22 @ [facility]     INDICATION:   M54.42: Lumbago with sciatica, left side.     COMPARISON:  6/23/2014.     VIEWS:  XR SPINE LUMBAR MINIMUM 4 VIEWS NON INJURY  Images: 4     FINDINGS:     There are 5 non rib bearing lumbar vertebral bodies.      There is slight degenerative retrolisthesis of L1 over L2 and slight anterolisthesis of L4 over L5, slightly progressed since the prior study. No acute fracture seen. There is moderate disc space narrowing at L1-L2 and mild disc space narrowing at   L4-L5 and L5-S1. There are degenerative endplate osteophytes at these levels and at multiple levels in the lower thoracic spine. There is multilevel facet arthropathy. Bilateral pars defects suspected at L5.     IMPRESSION:     No acute osseous abnormality.   Moderate multilevel degenerative disc disease as described above. Osteopenia. L5 spondylolysis.         MRI THORACIC SPINE WITHOUT CONTRAST 06-14-21 @  [facility]     INDICATION: M54.6: Pain in thoracic spine  G89.29: Other chronic pain.     COMPARISON:  None.     TECHNIQUE:  Sagittal T1, sagittal T2, sagittal inversion recovery, axial T2,  axial 2D MERGE.     IMAGE QUALITY: Diagnostic.     FINDINGS:     ALIGNMENT: Normal alignment of the thoracic spine. No compression fracture. No subluxation. No scoliosis.     MARROW SIGNAL:  Normal marrow signal is identified within the visualized bony structures. No discrete marrow lesion.     THORACIC CORD: Normal signal within the thoracic cord.     PARAVERTEBRAL SOFT TISSUES:  Normal.     THORACIC DEGENERATIVE CHANGE: Mild thoracic degenerative disc disease with slight loss of disc height but no discrete disc herniation, canal stenosis or foraminal narrowing. No cord compression. The T9-10 endplates are fused anteriorly.     Partial imaging of the cervical spine on sagittal T2-weighted imaging demonstrates mild cervical degenerative change.      IMPRESSION:     Mild noncompressive thoracic degenerative change. I have personally reviewed pertinent films in PACS and my interpretation is Multilevel disc degeneration with facet arthropathy.

## 2023-10-12 ENCOUNTER — HOSPITAL ENCOUNTER (OUTPATIENT)
Dept: MRI IMAGING | Facility: HOSPITAL | Age: 68
End: 2023-10-12
Attending: ANESTHESIOLOGY
Payer: COMMERCIAL

## 2023-10-12 DIAGNOSIS — M54.40 CHRONIC BILATERAL LOW BACK PAIN WITH SCIATICA, SCIATICA LATERALITY UNSPECIFIED: ICD-10-CM

## 2023-10-12 DIAGNOSIS — G89.29 CHRONIC BILATERAL LOW BACK PAIN WITH SCIATICA, SCIATICA LATERALITY UNSPECIFIED: ICD-10-CM

## 2023-10-12 PROCEDURE — 72148 MRI LUMBAR SPINE W/O DYE: CPT

## 2023-10-12 PROCEDURE — G1004 CDSM NDSC: HCPCS

## 2023-10-25 ENCOUNTER — OFFICE VISIT (OUTPATIENT)
Dept: PAIN MEDICINE | Facility: CLINIC | Age: 68
End: 2023-10-25
Payer: COMMERCIAL

## 2023-10-25 VITALS
DIASTOLIC BLOOD PRESSURE: 78 MMHG | BODY MASS INDEX: 36.44 KG/M2 | HEART RATE: 72 BPM | HEIGHT: 61 IN | TEMPERATURE: 97.3 F | WEIGHT: 193 LBS | SYSTOLIC BLOOD PRESSURE: 122 MMHG

## 2023-10-25 DIAGNOSIS — M54.50 CHRONIC BILATERAL LOW BACK PAIN, UNSPECIFIED WHETHER SCIATICA PRESENT: Primary | ICD-10-CM

## 2023-10-25 DIAGNOSIS — G89.29 CHRONIC BILATERAL LOW BACK PAIN, UNSPECIFIED WHETHER SCIATICA PRESENT: Primary | ICD-10-CM

## 2023-10-25 PROCEDURE — 99214 OFFICE O/P EST MOD 30 MIN: CPT | Performed by: NURSE PRACTITIONER

## 2023-10-25 RX ORDER — MELOXICAM 7.5 MG/1
7.5 TABLET ORAL DAILY PRN
Qty: 30 TABLET | Refills: 1 | Status: SHIPPED | OUTPATIENT
Start: 2023-10-25

## 2023-10-25 NOTE — PATIENT INSTRUCTIONS
Take meloxicam with food.   Use tylenol 1000mg three times a day as needed in addition to meloxicam  Try OTC pain patches such as Lidoderm, Salon Pos, Thermacare

## 2023-10-25 NOTE — PROGRESS NOTES
Assessment:  1. Chronic bilateral low back pain, unspecified whether sciatica present        Plan:  I will trial meloxicam 7.5 mg daily as needed for low back pain. Patient was advised to avoid other NSAIDs while on this medication and take the medication with food to avoid GI upset  Depending on response, may consider lumbar medial branch blocks in the future  Patient will trial over-the-counter topical analgesic patches such as Lidoderm 4%, salon pos ThermaCare  I advised the patient to trial Tylenol as needed and not to exceed more than 3000 mg in 24 hours  Continue with home exercise program as taught by physical therapy  Follow-up in 8 weeks or sooner if needed    History of Present Illness: The patient is a 80 y.o. female last seen on 4/5/2023 who presents for a follow up office visit in regards to chronic mid and low back pain that is nonradiating into the lower extremities. MRI of the lumbar spine reveals multilevel arthritis and degenerative disc disease most severe at L4-5 where there is severe arthritis resulting in mild central canal and foraminal stenosis. She denies bowel or bladder incontinence or saddle anesthesia. She has not been using any medication for pain including Advil or Tylenol. Advil in the past has caused GI upset. She has done physical therapy with transient relief. She has not tried over-the-counter analgesic patches    The patient rates her pain a 5-6 out of 10 on the numeric pain rating scale. She intermittently has pain in the morning which is described as throbbing    I have personally reviewed and/or updated the patient's past medical history, past surgical history, family history, social history, current medications, allergies, and vital signs today. Review of Systems:    Review of Systems   Respiratory:  Negative for shortness of breath. Cardiovascular:  Positive for chest pain. Gastrointestinal:  Negative for constipation, diarrhea, nausea and vomiting. Musculoskeletal:  Positive for gait problem. Negative for arthralgias, joint swelling and myalgias. Skin:  Negative for rash. Neurological:  Negative for dizziness, seizures and weakness. All other systems reviewed and are negative.         Past Medical History:   Diagnosis Date    Blepharitis of eyelid of left eye        Past Surgical History:   Procedure Laterality Date    CATARACT EXTRACTION Bilateral     DENTAL SURGERY      [place] teeth extraction    HIP ARTHROPLASTY      Right    HIP ARTHROSCOPY      Left    TUBAL LIGATION         Family History   Problem Relation Age of Onset    Kidney cancer Mother     Lung cancer Father     No Known Problems Sister     No Known Problems Maternal Grandmother     No Known Problems Maternal Grandfather     No Known Problems Paternal Grandmother     No Known Problems Paternal Grandfather     Breast cancer Maternal Aunt         70's    No Known Problems Maternal Aunt     No Known Problems Maternal Aunt     No Known Problems Maternal Aunt     No Known Problems Maternal Aunt     No Known Problems Maternal Aunt     No Known Problems Paternal Aunt     Mental illness Cousin     Substance Abuse Neg Hx     Alcohol abuse Neg Hx        Social History     Occupational History    Not on file   Tobacco Use    Smoking status: Never    Smokeless tobacco: Never   Vaping Use    Vaping Use: Never used   Substance and Sexual Activity    Alcohol use: No    Drug use: No    Sexual activity: Not on file         Current Outpatient Medications:     ascorbic acid (VITAMIN C) 500 mg tablet, Take 500 mg by mouth daily, Disp: , Rfl:     Calcium Citrate-Vitamin D (CALCIUM CITRATE CHEWY BITE PO), Take by mouth, Disp: , Rfl:     Flaxseed, Linseed, (FLAXSEED OIL PO), Take by mouth, Disp: , Rfl:     loratadine (CLARITIN) 10 mg tablet, Take 1 tablet (10 mg total) by mouth daily, Disp: 30 tablet, Rfl: 0    meloxicam (Mobic) 7.5 mg tablet, Take 1 tablet (7.5 mg total) by mouth daily as needed for mild pain or moderate pain, Disp: 30 tablet, Rfl: 1    multivitamin (THERAGRAN) TABS, Take 1 tablet by mouth daily, Disp: , Rfl:     olopatadine (PATANOL) 0.1 % ophthalmic solution, 1 drop 2 (two) times a day, Disp: , Rfl:     Omega-3 Fatty Acids (FISH OIL) 500 MG CAPS, Take by mouth 3 (three) times a day, Disp: , Rfl:     Allergies   Allergen Reactions    Dust Mite Extract Allergic Rhinitis       Physical Exam:    /78 (BP Location: Left arm, Patient Position: Sitting, Cuff Size: Standard)   Pulse 72   Temp (!) 97.3 °F (36.3 °C)   Ht 5' 1" (1.549 m)   Wt 87.5 kg (193 lb)   BMI 36.47 kg/m²     Constitutional:normal, well developed, well nourished, alert, in no distress and non-toxic and no overt pain behavior. Eyes:anicteric  HEENT:grossly intact  Neck:supple, symmetric, trachea midline and no masses   Pulmonary:even and unlabored  Cardiovascular:No edema or pitting edema present  Skin:Normal without rashes or lesions and well hydrated  Psychiatric:Mood and affect appropriate  Neurologic:Cranial Nerves II-XII grossly intact  Musculoskeletal:normal gait. Bilateral lumbar paraspinal musculature mildly tender to palpation. Equivocafacet loading maneuvers      Imaging    MRI LUMBAR SPINE WITHOUT CONTRAST 10/12/2023 @ [facility]     INDICATION: M54.40: Lumbago with sciatica, unspecified side  G89.29: Other chronic pain. COMPARISON: Plain film 8/22/2023. TECHNIQUE:  Multiplanar, multisequence imaging of the lumbar spine was performed. .        IMAGE QUALITY:  Diagnostic     FINDINGS:     VERTEBRAL BODIES:  There are 5 lumbar type vertebral bodies. Normal alignment of the lumbar spine. L4 on L5 grade 1 degenerative anterolisthesis and L1 on L2 grade 1 degenerative retrolisthesis. No pars defects. No scoliosis. No compression fracture. Normal marrow signal is identified within the visualized bony structures. No discrete marrow lesion. SACRUM:  Normal signal within the sacrum.  No evidence of insufficiency or stress fracture. DISTAL CORD AND CONUS:  Normal size and signal within the distal cord and conus. PARASPINAL SOFT TISSUES:  Paraspinal soft tissues are unremarkable. LOWER THORACIC DISC SPACES:  Normal disc height and signal.  No disc herniation, canal stenosis or foraminal narrowing. T9-10 partial developmental fusion of vertebral bodies anteriorly. LUMBAR DISC SPACES:     L1-L2: Minimal degenerative L1 on L2 retrolisthesis. Diffuse disc bulge with inferior exposure of the disc and facet arthrosis. Central canal and foramina remain patent. L2-L3:  Normal.     L3-L4: Minor disc osteophyte complex without significant stenosis. L4-L5: Grade 1 degenerative anterolisthesis noted at this level. Diffuse disc bulge with unroofing of the disc and severe bilateral facet arthrosis resulting in mild central canal stenosis with mild bilateral foraminal narrowing. L5-S1: Minor bulge with right greater than left facet arthrosis. Mild bilateral foraminal stenosis with the central canal remain patent. OTHER FINDINGS:  None. IMPRESSION:     Mild multilevel spondylotic changes of the lumbar spine as detailed above.

## 2023-12-11 ENCOUNTER — RA CDI HCC (OUTPATIENT)
Dept: OTHER | Facility: HOSPITAL | Age: 68
End: 2023-12-11

## 2023-12-11 ENCOUNTER — OFFICE VISIT (OUTPATIENT)
Dept: PAIN MEDICINE | Facility: CLINIC | Age: 68
End: 2023-12-11
Payer: COMMERCIAL

## 2023-12-11 VITALS
HEART RATE: 84 BPM | TEMPERATURE: 98 F | SYSTOLIC BLOOD PRESSURE: 128 MMHG | DIASTOLIC BLOOD PRESSURE: 78 MMHG | WEIGHT: 188 LBS | BODY MASS INDEX: 35.5 KG/M2 | HEIGHT: 61 IN

## 2023-12-11 DIAGNOSIS — M54.50 CHRONIC BILATERAL LOW BACK PAIN, UNSPECIFIED WHETHER SCIATICA PRESENT: ICD-10-CM

## 2023-12-11 DIAGNOSIS — G89.29 CHRONIC BILATERAL LOW BACK PAIN, UNSPECIFIED WHETHER SCIATICA PRESENT: ICD-10-CM

## 2023-12-11 DIAGNOSIS — M47.816 LUMBAR SPONDYLOSIS: ICD-10-CM

## 2023-12-11 DIAGNOSIS — M54.9 MID BACK PAIN: ICD-10-CM

## 2023-12-11 DIAGNOSIS — M79.18 MYOFASCIAL PAIN SYNDROME: Primary | ICD-10-CM

## 2023-12-11 PROCEDURE — 99214 OFFICE O/P EST MOD 30 MIN: CPT | Performed by: PHYSICIAN ASSISTANT

## 2023-12-11 RX ORDER — MELOXICAM 7.5 MG/1
7.5 TABLET ORAL DAILY PRN
Qty: 30 TABLET | Refills: 5 | Status: SHIPPED | OUTPATIENT
Start: 2023-12-11

## 2023-12-11 NOTE — PROGRESS NOTES
Assessment:  1. Myofascial pain syndrome    2. Lumbar spondylosis    3. Mid back pain    4. Chronic bilateral low back pain, unspecified whether sciatica present        Plan:  While the patient was in the office today, I did have a thorough conversation regarding their chronic pain syndrome, medication management, and treatment plan options. The patient remains clinically stable and very well-controlled on the combination of meloxicam and the as needed use of over-the-counter acetaminophen. On today's visit I have electronically sent refills for the Mobic. Consider lumbar medial branch blocks in the future if indicated. The patient will follow-up in 6 months for medication prescription refill and reevaluation. The patient was advised to contact the office should their symptoms worsen in the interim. The patient was agreeable and verbalized an understanding. History of Present Illness: The patient is a 80 y.o. female last seen on 10/25/2023 who presents for a follow up office visit in regards to chronic back pain. The patient currently reports chronic low back pain and mid back pain that she presently rates a 4 out of 10 and describes it as an occasional cramping type of pain. Since the addition of meloxicam at her last appointment she reports significant improvement. She takes over-the-counter Tylenol on a as needed basis with relief when she has increased pain. On today's visit she has no new symptoms or acute issues and she does not feel that her pain is severe enough to warrant any interventional treatment. I have personally reviewed and/or updated the patient's past medical history, past surgical history, family history, social history, current medications, allergies, and vital signs today. Review of Systems:    Review of Systems   Respiratory:  Positive for shortness of breath. Cardiovascular:  Positive for chest pain.    Gastrointestinal:  Negative for constipation, diarrhea, nausea and vomiting. Musculoskeletal:  Positive for gait problem. Negative for arthralgias, joint swelling (Joint stiffness) and myalgias. Skin:  Negative for rash. Neurological:  Negative for dizziness, seizures and weakness. All other systems reviewed and are negative.         Past Medical History:   Diagnosis Date   • Blepharitis of eyelid of left eye        Past Surgical History:   Procedure Laterality Date   • CATARACT EXTRACTION Bilateral    • DENTAL SURGERY      [place] teeth extraction   • HIP ARTHROPLASTY      Right   • HIP ARTHROSCOPY      Left   • TUBAL LIGATION         Family History   Problem Relation Age of Onset   • Kidney cancer Mother    • Lung cancer Father    • No Known Problems Sister    • No Known Problems Maternal Grandmother    • No Known Problems Maternal Grandfather    • No Known Problems Paternal Grandmother    • No Known Problems Paternal Grandfather    • Breast cancer Maternal Aunt         70's   • No Known Problems Maternal Aunt    • No Known Problems Maternal Aunt    • No Known Problems Maternal Aunt    • No Known Problems Maternal Aunt    • No Known Problems Maternal Aunt    • No Known Problems Paternal Aunt    • Mental illness Cousin    • Substance Abuse Neg Hx    • Alcohol abuse Neg Hx        Social History     Occupational History   • Not on file   Tobacco Use   • Smoking status: Never   • Smokeless tobacco: Never   Vaping Use   • Vaping Use: Never used   Substance and Sexual Activity   • Alcohol use: No   • Drug use: No   • Sexual activity: Not on file         Current Outpatient Medications:   •  ascorbic acid (VITAMIN C) 500 mg tablet, Take 500 mg by mouth daily, Disp: , Rfl:   •  Calcium Citrate-Vitamin D (CALCIUM CITRATE CHEWY BITE PO), Take by mouth, Disp: , Rfl:   •  Flaxseed, Linseed, (FLAXSEED OIL PO), Take by mouth, Disp: , Rfl:   •  loratadine (CLARITIN) 10 mg tablet, Take 1 tablet (10 mg total) by mouth daily, Disp: 30 tablet, Rfl: 0  •  meloxicam (Mobic) 7.5 mg tablet, Take 1 tablet (7.5 mg total) by mouth daily as needed for mild pain or moderate pain, Disp: 30 tablet, Rfl: 5  •  multivitamin (THERAGRAN) TABS, Take 1 tablet by mouth daily, Disp: , Rfl:   •  olopatadine (PATANOL) 0.1 % ophthalmic solution, 1 drop 2 (two) times a day, Disp: , Rfl:   •  Omega-3 Fatty Acids (FISH OIL) 500 MG CAPS, Take by mouth 3 (three) times a day, Disp: , Rfl:     Allergies   Allergen Reactions   • Dust Mite Extract Allergic Rhinitis       Physical Exam:    /78 (BP Location: Left arm, Patient Position: Sitting, Cuff Size: Large)   Pulse 84   Temp 98 °F (36.7 °C)   Ht 5' 1" (1.549 m)   Wt 85.3 kg (188 lb)   BMI 35.52 kg/m²     Constitutional:normal, well developed, well nourished, alert, in no distress and non-toxic and no overt pain behavior. Eyes:anicteric  HEENT:grossly intact  Neck:supple, symmetric, trachea midline and no masses   Pulmonary:even and unlabored  Cardiovascular:No edema or pitting edema present  Skin:Normal without rashes or lesions and well hydrated  Psychiatric:Mood and affect appropriate  Neurologic:Cranial Nerves II-XII grossly intact  Musculoskeletal:normal      Imaging  No orders to display         No orders of the defined types were placed in this encounter.

## 2023-12-15 ENCOUNTER — OFFICE VISIT (OUTPATIENT)
Dept: FAMILY MEDICINE CLINIC | Facility: CLINIC | Age: 68
End: 2023-12-15
Payer: COMMERCIAL

## 2023-12-15 VITALS
RESPIRATION RATE: 16 BRPM | TEMPERATURE: 98 F | HEART RATE: 86 BPM | HEIGHT: 61 IN | SYSTOLIC BLOOD PRESSURE: 110 MMHG | OXYGEN SATURATION: 97 % | BODY MASS INDEX: 35.5 KG/M2 | WEIGHT: 188 LBS | DIASTOLIC BLOOD PRESSURE: 72 MMHG

## 2023-12-15 DIAGNOSIS — Z78.0 POST-MENOPAUSAL: Primary | ICD-10-CM

## 2023-12-15 DIAGNOSIS — Z00.00 MEDICARE ANNUAL WELLNESS VISIT, SUBSEQUENT: ICD-10-CM

## 2023-12-15 DIAGNOSIS — E66.09 CLASS 2 OBESITY DUE TO EXCESS CALORIES WITHOUT SERIOUS COMORBIDITY WITH BODY MASS INDEX (BMI) OF 35.0 TO 35.9 IN ADULT: ICD-10-CM

## 2023-12-15 DIAGNOSIS — R53.83 FATIGUE, UNSPECIFIED TYPE: ICD-10-CM

## 2023-12-15 PROCEDURE — 99214 OFFICE O/P EST MOD 30 MIN: CPT | Performed by: PHYSICIAN ASSISTANT

## 2023-12-15 PROCEDURE — G0439 PPPS, SUBSEQ VISIT: HCPCS | Performed by: PHYSICIAN ASSISTANT

## 2023-12-15 NOTE — PROGRESS NOTES
Assessment and Plan:     AWV - conducted    2.   Laryngitis - reassurance, try humidifier in room. If no better consider ENT eval    3.   Fatigue - try to limit how much she is doing for others and take care of herself    Labs ordered    F/u yearly and as needed      Depression Screening and Follow-up Plan: Patient was screened for depression during today's encounter. They screened negative with a PHQ-2 score of 0.      Preventive health issues were discussed with patient, and age appropriate screening tests were ordered as noted in patient's After Visit Summary.  Personalized health advice and appropriate referrals for health education or preventive services given if needed, as noted in patient's After Visit Summary.     History of Present Illness:     Patient presents for a Medicare Wellness Visit    Patient here for AWV.    Also with laryngitis off and on. Taking robitussin without relief, denies nasal congestion, cough, sinus pressure, ear pain, n/v/d, reflux, fever, chills.  Just the past two months. In an apartment, second floor. Some other neighbors have similar problems.    Has been caring for her 93 year old friend that is failing. Is creating a lot of stress for her.       Patient Care Team:  Denise Noriega PA-C as PCP - General (Family Medicine)  Julieta Lora MD as PCP - PCP-[facility] ([facility])  Julieta Lora MD as PCP - PCP-[facility] ([facility])  Julieta Lora MD as PCP - PCP-[facility] ([facility])  YENNY Adams MD     Review of Systems:     Review of Systems     Problem List:     Patient Active Problem List   Diagnosis    Allergic rhinitis due to pollen    Arthritis    Obesity    Biceps tendinitis of left upper extremity    Labral tear of long head of left biceps tendon    Other age-related cataract      Past Medical and Surgical History:     Past Medical History:   Diagnosis Date    Blepharitis of eyelid of left eye      Past Surgical History:    Procedure Laterality Date    CATARACT EXTRACTION Bilateral     DENTAL SURGERY      [place] teeth extraction    HIP ARTHROPLASTY      Right    HIP ARTHROSCOPY      Left    TUBAL LIGATION        Family History:     Family History   Problem Relation Age of Onset    Kidney cancer Mother     Lung cancer Father     No Known Problems Sister     No Known Problems Maternal Grandmother     No Known Problems Maternal Grandfather     No Known Problems Paternal Grandmother     No Known Problems Paternal Grandfather     Breast cancer Maternal Aunt         70's    No Known Problems Maternal Aunt     No Known Problems Maternal Aunt     No Known Problems Maternal Aunt     No Known Problems Maternal Aunt     No Known Problems Maternal Aunt     No Known Problems Paternal Aunt     Mental illness Cousin     Substance Abuse Neg Hx     Alcohol abuse Neg Hx       Social History:     Social History     Socioeconomic History    Marital status: Single     Spouse name: None    Number of children: None    Years of education: None    Highest education level: None   Occupational History    None   Tobacco Use    Smoking status: Never    Smokeless tobacco: Never   Vaping Use    Vaping status: Never Used   Substance and Sexual Activity    Alcohol use: No    Drug use: No    Sexual activity: None   Other Topics Concern    None   Social History Narrative    Always uses a seatbelt    Caffeine use- 1 cup of coffee per day    Has smoke detectors     Social Determinants of Health     Financial Resource Strain: Low Risk  (12/15/2023)    Overall Financial Resource Strain (CARDIA)     Difficulty of Paying Living Expenses: Not very hard   Food Insecurity: Not on file   Transportation Needs: No Transportation Needs (12/15/2023)    PRAPARE - Transportation     Lack of Transportation (Medical): No     Lack of Transportation (Non-Medical): No   Physical Activity: Not on file   Stress: Not on file   Social Connections: Not on file   Intimate Partner Violence: Not  on file   Housing Stability: Not on file      Medications and Allergies:     Current Outpatient Medications   Medication Sig Dispense Refill    ascorbic acid (VITAMIN C) 500 mg tablet Take 500 mg by mouth daily      Calcium Citrate-Vitamin D (CALCIUM CITRATE CHEWY BITE PO) Take by mouth      Flaxseed, Linseed, (FLAXSEED OIL PO) Take by mouth      loratadine (CLARITIN) 10 mg tablet Take 1 tablet (10 mg total) by mouth daily 30 tablet 0    meloxicam (Mobic) 7.5 mg tablet Take 1 tablet (7.5 mg total) by mouth daily as needed for mild pain or moderate pain 30 tablet 5    multivitamin (THERAGRAN) TABS Take 1 tablet by mouth daily      Omega-3 Fatty Acids (FISH OIL) 500 MG CAPS Take by mouth 3 (three) times a day      olopatadine (PATANOL) 0.1 % ophthalmic solution 1 drop 2 (two) times a day (Patient not taking: Reported on 12/15/2023)       No current facility-administered medications for this visit.     Allergies   Allergen Reactions    Dust Mite Extract Allergic Rhinitis      Immunizations:     Immunization History   Administered Date(s) Administered    COVID-19 PFIZER VACCINE 0.3 ML IM 04/05/2021, 04/30/2021, 11/01/2021    COVID-19 Pfizer vac (Arsh-sucrose, gray cap) 12 yr+ IM 05/19/2022    INFLUENZA 11/12/2013, 10/13/2022    Influenza Quadrivalent Preservative Free 3 years and older IM 10/24/2016    Influenza Quadrivalent, 6-35 Months IM 09/05/2017    Influenza, high dose seasonal 0.7 mL 10/08/2021, 10/13/2022    Influenza, injectable, quadrivalent, preservative free 0.5 mL 09/30/2019, 08/26/2020    Influenza, recombinant, quadrivalent,injectable, preservative free 10/01/2018    Pneumococcal Conjugate Vaccine 20-valent (Pcv20), Polysace 12/14/2022    Tdap 08/09/2016    influenza, injectable, quadrivalent 10/24/2016      Health Maintenance:         Topic Date Due    DXA SCAN  11/12/2023    Breast Cancer Screening: Mammogram  12/28/2023    Colorectal Cancer Screening  09/29/2025    Hepatitis C Screening  Completed          Topic Date Due    Influenza Vaccine (1) 09/01/2023    COVID-19 Vaccine (5 - 2023-24 season) 09/01/2023      Medicare Screening Tests and Risk Assessments:     Katrina is here for her Subsequent Wellness visit.     Health Risk Assessment:   Patient rates overall health as good. Patient feels that their physical health rating is same. Patient is satisfied with their life. Eyesight was rated as slightly worse. Hearing was rated as same. Patient feels that their emotional and mental health rating is same. Patients states they are never, rarely angry. Patient states they are often unusually tired/fatigued. Pain experienced in the last 7 days has been none. Patient states that she has experienced no weight loss or gain in last 6 months.     Depression Screening:   PHQ-2 Score: 0      Fall Risk Screening:   In the past year, patient has experienced: no history of falling in past year      Urinary Incontinence Screening:   Patient has not leaked urine accidently in the last six months.     Home Safety:  Patient does not have trouble with stairs inside or outside of their home. Patient has working smoke alarms and has working carbon monoxide detector. Home safety hazards include: none.     Nutrition:   Current diet is Regular.     Medications:   Patient is currently taking over-the-counter supplements. OTC medications include: see medication list. Patient is able to manage medications.     Activities of Daily Living (ADLs)/Instrumental Activities of Daily Living (IADLs):   Walk and transfer into and out of bed and chair?: Yes  Dress and groom yourself?: Yes    Bathe or shower yourself?: Yes    Feed yourself? Yes  Do your laundry/housekeeping?: Yes  Manage your money, pay your bills and track your expenses?: Yes  Make your own meals?: Yes    Do your own shopping?: Yes    Previous Hospitalizations:   Any hospitalizations or ED visits within the last 12 months?: No      Advance Care Planning:   Living will: No    Advanced  "directive: No    End of Life Decisions reviewed with patient: Yes    Provider agrees with end of life decisions: Yes      Cognitive Screening:   Provider or family/friend/caregiver concerned regarding cognition?: No    PREVENTIVE SCREENINGS      Cardiovascular Screening:    General: Screening Current and Risks and Benefits Discussed    Due for: Lipid Panel      Diabetes Screening:     General: Risks and Benefits Discussed    Due for: Blood Glucose      Colorectal Cancer Screening:     General: Screening Current      Breast Cancer Screening:     General: Screening Current      Cervical Cancer Screening:    General: Screening Not Indicated      Osteoporosis Screening:    General: Risks and Benefits Discussed    Due for: DXA Axial      Abdominal Aortic Aneurysm (AAA) Screening:        General: Screening Not Indicated      Lung Cancer Screening:     General: Screening Not Indicated      Hepatitis C Screening:    General: Screening Current    Screening, Brief Intervention, and Referral to Treatment (SBIRT)    Screening  Typical number of drinks in a day: 0  Typical number of drinks in a week: 0  Interpretation: Low risk drinking behavior.    Single Item Drug Screening:  How often have you used an illegal drug (including marijuana) or a prescription medication for non-medical reasons in the past year? never    Single Item Drug Screen Score: 0  Interpretation: Negative screen for possible drug use disorder    Brief Intervention  Alcohol & drug use screenings were reviewed. No concerns regarding substance use disorder identified.     Other Counseling Topics:   Car/seat belt/driving safety, skin self-exam, sunscreen and calcium and vitamin D intake and regular weightbearing exercise.     No results found.     Physical Exam:     Temp 98 °F (36.7 °C) (Temporal)   Resp 16   Ht 5' 1\" (1.549 m)   Wt 85.3 kg (188 lb)   BMI 35.52 kg/m²     Physical Exam  Constitutional:       Appearance: Normal appearance. She is well-developed " and normal weight.   HENT:      Head: Normocephalic and atraumatic.      Right Ear: Hearing, tympanic membrane, ear canal and external ear normal.      Left Ear: Hearing, tympanic membrane, ear canal and external ear normal.      Nose: Nose normal.      Mouth/Throat:      Mouth: Mucous membranes are moist.      Pharynx: Oropharynx is clear. Uvula midline.   Eyes:      Extraocular Movements: Extraocular movements intact.      Conjunctiva/sclera: Conjunctivae normal.      Pupils: Pupils are equal, round, and reactive to light.   Neck:      Thyroid: No thyromegaly.   Cardiovascular:      Rate and Rhythm: Normal rate and regular rhythm.      Heart sounds: Normal heart sounds. No murmur heard.  Pulmonary:      Effort: Pulmonary effort is normal.      Breath sounds: Normal breath sounds.   Musculoskeletal:         General: Normal range of motion.      Cervical back: Normal range of motion and neck supple.   Lymphadenopathy:      Cervical: No cervical adenopathy.   Skin:     General: Skin is warm.   Neurological:      General: No focal deficit present.      Mental Status: She is alert and oriented to person, place, and time.      Cranial Nerves: No cranial nerve deficit.      Deep Tendon Reflexes: Reflexes normal.   Psychiatric:         Mood and Affect: Mood normal.         Behavior: Behavior normal.         Thought Content: Thought content normal.         Judgment: Judgment normal.          Denise Noriega PA-C

## 2023-12-15 NOTE — PATIENT INSTRUCTIONS
Medicare Preventive Visit Patient Instructions  Thank you for completing your Welcome to Medicare Visit or Medicare Annual Wellness Visit today. Your next wellness visit will be due in one year (12/15/2024).  The screening/preventive services that you may require over the next 5-10 years are detailed below. Some tests may not apply to you based off risk factors and/or age. Screening tests ordered at today's visit but not completed yet may show as past due. Also, please note that scanned in results may not display below.  Preventive Screenings:  Service Recommendations Previous Testing/Comments   Colorectal Cancer Screening  * Colonoscopy    * Fecal Occult Blood Test (FOBT)/Fecal Immunochemical Test (FIT)  * Fecal DNA/Cologuard Test  * Flexible Sigmoidoscopy Age: 45-75 years old   Colonoscopy: every 10 years (may be performed more frequently if at higher risk)  OR  FOBT/FIT: every 1 year  OR  Cologuard: every 3 years  OR  Sigmoidoscopy: every 5 years  Screening may be recommended earlier than age 45 if at higher risk for colorectal cancer. Also, an individualized decision between you and your healthcare provider will decide whether screening between the ages of 76-85 would be appropriate. Colonoscopy: 09/29/2015  FOBT/FIT: Not on file  Cologuard: Not on file  Sigmoidoscopy: Not on file    Screening Current     Breast Cancer Screening Age: 40+ years old  Frequency: every 1-2 years  Not required if history of left and right mastectomy Mammogram: 12/28/2022    Screening Current   Cervical Cancer Screening Between the ages of 21-29, pap smear recommended once every 3 years.   Between the ages of 30-65, can perform pap smear with HPV co-testing every 5 years.   Recommendations may differ for women with a history of total hysterectomy, cervical cancer, or abnormal pap smears in past. Pap Smear: Not on file    Screening Not Indicated   Hepatitis C Screening Once for adults born between 1945 and 1965  More frequently in  patients at high risk for Hepatitis C Hep C Antibody: 01/06/2012    Screening Current   Diabetes Screening 1-2 times per year if you're at risk for diabetes or have pre-diabetes Fasting glucose: 88 mg/dL (3/9/2021)  A1C: No results in last 5 years (No results in last 5 years)      Cholesterol Screening Once every 5 years if you don't have a lipid disorder. May order more often based on risk factors. Lipid panel: 03/09/2021    Screening Current     Other Preventive Screenings Covered by Medicare:  Abdominal Aortic Aneurysm (AAA) Screening: covered once if your at risk. You're considered to be at risk if you have a family history of AAA.  Lung Cancer Screening: covers low dose CT scan once per year if you meet all of the following conditions: (1) Age 55-77; (2) No signs or symptoms of lung cancer; (3) Current smoker or have quit smoking within the last 15 years; (4) You have a tobacco smoking history of at least 20 pack years (packs per day multiplied by number of years you smoked); (5) You get a written order from a healthcare provider.  Glaucoma Screening: covered annually if you're considered high risk: (1) You have diabetes OR (2) Family history of glaucoma OR (3)  aged 50 and older OR (4)  American aged 65 and older  Osteoporosis Screening: covered every 2 years if you meet one of the following conditions: (1) You're estrogen deficient and at risk for osteoporosis based off medical history and other findings; (2) Have a vertebral abnormality; (3) On glucocorticoid therapy for more than 3 months; (4) Have primary hyperparathyroidism; (5) On osteoporosis medications and need to assess response to drug therapy.   Last bone density test (DXA Scan): 11/12/2020.  HIV Screening: covered annually if you're between the age of 15-65. Also covered annually if you are younger than 15 and older than 65 with risk factors for HIV infection. For pregnant patients, it is covered up to 3 times per  pregnancy.    Immunizations:  Immunization Recommendations   Influenza Vaccine Annual influenza vaccination during flu season is recommended for all persons aged >= 6 months who do not have contraindications   Pneumococcal Vaccine   * Pneumococcal conjugate vaccine = PCV13 (Prevnar 13), PCV15 (Vaxneuvance), PCV20 (Prevnar 20)  * Pneumococcal polysaccharide vaccine = PPSV23 (Pneumovax) Adults 19-63 yo with certain risk factors or if 65+ yo  If never received any pneumonia vaccine: recommend Prevnar 20 (PCV20)  Give PCV20 if previously received 1 dose of PCV13 or PPSV23   Hepatitis B Vaccine 3 dose series if at intermediate or high risk (ex: diabetes, end stage renal disease, liver disease)   Respiratory syncytial virus (RSV) Vaccine - COVERED BY MEDICARE PART D  * RSVPreF3 (Arexvy) CDC recommends that adults 60 years of age and older may receive a single dose of RSV vaccine using shared clinical decision-making (SCDM)   Tetanus (Td) Vaccine - COST NOT COVERED BY MEDICARE PART B Following completion of primary series, a booster dose should be given every 10 years to maintain immunity against tetanus. Td may also be given as tetanus wound prophylaxis.   Tdap Vaccine - COST NOT COVERED BY MEDICARE PART B Recommended at least once for all adults. For pregnant patients, recommended with each pregnancy.   Shingles Vaccine (Shingrix) - COST NOT COVERED BY MEDICARE PART B  2 shot series recommended in those 19 years and older who have or will have weakened immune systems or those 50 years and older     Health Maintenance Due:      Topic Date Due   • DXA SCAN  11/12/2023   • Breast Cancer Screening: Mammogram  12/28/2023   • Colorectal Cancer Screening  09/29/2025   • Hepatitis C Screening  Completed     Immunizations Due:      Topic Date Due   • COVID-19 Vaccine (5 - 2023-24 season) 09/01/2023     Advance Directives   What are advance directives?  Advance directives are legal documents that state your wishes and plans for  medical care. These plans are made ahead of time in case you lose your ability to make decisions for yourself. Advance directives can apply to any medical decision, such as the treatments you want, and if you want to donate organs.   What are the types of advance directives?  There are many types of advance directives, and each state has rules about how to use them. You may choose a combination of any of the following:  Living will:  This is a written record of the treatment you want. You can also choose which treatments you do not want, which to limit, and which to stop at a certain time. This includes surgery, medicine, IV fluid, and tube feedings.   Durable power of  for healthcare (DPAHC):  This is a written record that states who you want to make healthcare choices for you when you are unable to make them for yourself. This person, called a proxy, is usually a family member or a friend. You may choose more than 1 proxy.  Do not resuscitate (DNR) order:  A DNR order is used in case your heart stops beating or you stop breathing. It is a request not to have certain forms of treatment, such as CPR. A DNR order may be included in other types of advance directives.  Medical directive:  This covers the care that you want if you are in a coma, near death, or unable to make decisions for yourself. You can list the treatments you want for each condition. Treatment may include pain medicine, surgery, blood transfusions, dialysis, IV or tube feedings, and a ventilator (breathing machine).  Values history:  This document has questions about your views, beliefs, and how you feel and think about life. This information can help others choose the care that you would choose.  Why are advance directives important?  An advance directive helps you control your care. Although spoken wishes may be used, it is better to have your wishes written down. Spoken wishes can be misunderstood, or not followed. Treatments may be given  even if you do not want them. An advance directive may make it easier for your family to make difficult choices about your care.   Weight Management   Why it is important to manage your weight:  Being overweight increases your risk of health conditions such as heart disease, high blood pressure, type 2 diabetes, and certain types of cancer. It can also increase your risk for osteoarthritis, sleep apnea, and other respiratory problems. Aim for a slow, steady weight loss. Even a small amount of weight loss can lower your risk of health problems.  How to lose weight safely:  A safe and healthy way to lose weight is to eat fewer calories and get regular exercise. You can lose up about 1 pound a week by decreasing the number of calories you eat by 500 calories each day.   Healthy meal plan for weight management:  A healthy meal plan includes a variety of foods, contains fewer calories, and helps you stay healthy. A healthy meal plan includes the following:  Eat whole-grain foods more often.  A healthy meal plan should contain fiber. Fiber is the part of grains, fruits, and vegetables that is not broken down by your body. Whole-grain foods are healthy and provide extra fiber in your diet. Some examples of whole-grain foods are whole-wheat breads and pastas, oatmeal, brown rice, and bulgur.  Eat a variety of vegetables every day.  Include dark, leafy greens such as spinach, kale, mata greens, and mustard greens. Eat yellow and orange vegetables such as carrots, sweet potatoes, and winter squash.   Eat a variety of fruits every day.  Choose fresh or canned fruit (canned in its own juice or light syrup) instead of juice. Fruit juice has very little or no fiber.  Eat low-fat dairy foods.  Drink fat-free (skim) milk or 1% milk. Eat fat-free yogurt and low-fat cottage cheese. Try low-fat cheeses such as mozzarella and other reduced-fat cheeses.  Choose meat and other protein foods that are low in fat.  Choose beans or other  legumes such as split peas or lentils. Choose fish, skinless poultry (chicken or turkey), or lean cuts of red meat (beef or pork). Before you cook meat or poultry, cut off any visible fat.   Use less fat and oil.  Try baking foods instead of frying them. Add less fat, such as margarine, sour cream, regular salad dressing and mayonnaise to foods. Eat fewer high-fat foods. Some examples of high-fat foods include french fries, doughnuts, ice cream, and cakes.  Eat fewer sweets.  Limit foods and drinks that are high in sugar. This includes candy, cookies, regular soda, and sweetened drinks.  Exercise:  Exercise at least 30 minutes per day on most days of the week. Some examples of exercise include walking, biking, dancing, and swimming. You can also fit in more physical activity by taking the stairs instead of the elevator or parking farther away from stores. Ask your healthcare provider about the best exercise plan for you.      © Copyright [employer] 2018 Information is for End User's use only and may not be sold, redistributed or otherwise used for commercial purposes. All illustrations and images included in CareNotes® are the copyrighted property of [employer] or [employer]

## 2023-12-21 ENCOUNTER — OFFICE VISIT (OUTPATIENT)
Dept: URGENT CARE | Facility: CLINIC | Age: 68
End: 2023-12-21
Payer: COMMERCIAL

## 2023-12-21 VITALS
DIASTOLIC BLOOD PRESSURE: 74 MMHG | HEART RATE: 77 BPM | RESPIRATION RATE: 18 BRPM | TEMPERATURE: 97.6 F | WEIGHT: 187.2 LBS | OXYGEN SATURATION: 99 % | BODY MASS INDEX: 35.37 KG/M2 | SYSTOLIC BLOOD PRESSURE: 120 MMHG

## 2023-12-21 DIAGNOSIS — J06.9 VIRAL URI WITH COUGH: Primary | ICD-10-CM

## 2023-12-21 PROCEDURE — G0463 HOSPITAL OUTPT CLINIC VISIT: HCPCS

## 2023-12-21 PROCEDURE — 99213 OFFICE O/P EST LOW 20 MIN: CPT

## 2023-12-21 RX ORDER — PREDNISONE 10 MG/1
TABLET ORAL DAILY
Qty: 15 TABLET | Refills: 0 | Status: SHIPPED | OUTPATIENT
Start: 2023-12-21 | End: 2023-12-25

## 2023-12-21 RX ORDER — BENZONATATE 200 MG/1
200 CAPSULE ORAL 3 TIMES DAILY PRN
Qty: 20 CAPSULE | Refills: 0 | Status: SHIPPED | OUTPATIENT
Start: 2023-12-21

## 2023-12-21 NOTE — PROGRESS NOTES
Daily Note     Today's date: 2021  Patient name: Carri Johnson  : 1955  MRN: [id number]  Referring provider: Yunier Yates PA-C  Dx:   Encounter Diagnosis     ICD-10-CM    1  Bilateral low back pain, unspecified chronicity, unspecified whether sciatica present  M54 5                   Subjective: Pt reports slight soreness after her LV but denies any sig increase in pain      Objective: See treatment diary below      Assessment: progressed therex as listed  Pt requires frequent verbal/manual cues for proper form with hip hike  No pain reports t/o tx  progress as janet NV  Plan: Continue per plan of care        Precautions: B TRIP      Manuals    R QL TPR   kl MD kl                                       Neuro Re-Ed           TA retraining           Standing press down into pball for core activation                                                       Ther Ex           Bike 10' 10' 10' 10' 10'   10'   Lateral stepping Black  12ft x10  nv     black  12ft x10   Sit to stand           bridges           Palloff press           Hip abduction stand  5#  30 ea  nv     stand  abd, ext  5#  30 ea   Clamshell            prone hip extension           SKTC           Pball lumbar flex 3 way 5"x10 ea 5"x10  ea 10"x10 ea 10x10" ea 10"x10   10"x10 ea   LTR           Hamstring curls  50#  3x10 50#  3x10 50# x30 50# x30 50#x30    50#   3x10   Low rows  black   30  Black   30 Black x30 Black x30 Black x30    black   3x10   rows  black    30  Black   30 Black x30 Black x30 Black x30    black   3x10   pball wall squats           Leg press squats 50#  3x10 50#  3x10 50# x30 50# x30 50#x30    60#   3x10   LAQ 30#  2x10 30#  2x10 30# x20 30#x20 30#x30    40#   3x10   Lateral step up and over nv nv  6"x20 8"x20      Hip hike nv nv  unable 4"x20                            Ther Activity                                                       Modalities           MHP  10' pos-tx seated   10'
sensation +LT B LE's

## 2023-12-21 NOTE — PROGRESS NOTES
[facility]        NAME: Katrina Vivas is a 68 y.o. female  : 1955    MRN: [id number]  DATE: 2023  TIME: 5:10 PM    Assessment and Plan   Viral URI with cough [J06.9]  1. Viral URI with cough  benzonatate (TESSALON) 200 MG capsule    predniSONE 10 mg tablet            Patient Instructions     Start Tessalon for cough.    Continue using humidifier.    Follow-up with your PCP if no improvement in 3-5 days.    Go to the ED for any worsening symptoms.      Chief Complaint     Chief Complaint   Patient presents with    Cough    Nasal Congestion     Patient reports having a dry cough for 3 weeks and a runny nose this morning. States she seen her primary care dr but still no better. She has been taking OTC medications with no relief.         History of Present Illness       This is a 68-year-old female who presents for evaluation of a cough x 3 weeks.  Patient states cough started out dry and is now loose.  Feels like it could be productive of mucus but she has difficulty coughing it up.  She denies wheezing and SOB.  Also with nasal congestion and rhinorrhea.  No known fevers or GI S/S.  Taking Robitussin DM and NyQuil for symptom relief.  Denies history of asthma.  Non-smoker.        Review of Systems   Review of Systems   Constitutional:  Negative for chills and fever.   HENT:  Positive for congestion and rhinorrhea. Negative for ear pain and sore throat.    Eyes:  Negative for discharge and redness.   Respiratory:  Positive for cough. Negative for shortness of breath and wheezing.    Cardiovascular:  Negative for chest pain and palpitations.   Gastrointestinal:  Negative for abdominal pain, diarrhea, nausea and vomiting.   Skin:  Negative for pallor and rash.   Neurological:  Negative for dizziness, light-headedness and headaches.         Current Medications       Current Outpatient Medications:     benzonatate (TESSALON) 200 MG capsule, Take 1 capsule (200 mg total) by mouth 3 (three)  times a day as needed for cough, Disp: 20 capsule, Rfl: 0    predniSONE 10 mg tablet, Take 5 tablets (50 mg total) by mouth daily for 1 day, THEN 4 tablets (40 mg total) daily for 1 day, THEN 3 tablets (30 mg total) daily for 1 day, THEN 2 tablets (20 mg total) daily for 1 day, THEN 1 tablet (10 mg total) daily for 1 day., Disp: 15 tablet, Rfl: 0    ascorbic acid (VITAMIN C) 500 mg tablet, Take 500 mg by mouth daily, Disp: , Rfl:     Calcium Citrate-Vitamin D (CALCIUM CITRATE CHEWY BITE PO), Take by mouth, Disp: , Rfl:     Flaxseed, Linseed, (FLAXSEED OIL PO), Take by mouth, Disp: , Rfl:     loratadine (CLARITIN) 10 mg tablet, Take 1 tablet (10 mg total) by mouth daily, Disp: 30 tablet, Rfl: 0    meloxicam (Mobic) 7.5 mg tablet, Take 1 tablet (7.5 mg total) by mouth daily as needed for mild pain or moderate pain, Disp: 30 tablet, Rfl: 5    multivitamin (THERAGRAN) TABS, Take 1 tablet by mouth daily, Disp: , Rfl:     olopatadine (PATANOL) 0.1 % ophthalmic solution, 1 drop 2 (two) times a day (Patient not taking: Reported on 12/15/2023), Disp: , Rfl:     Omega-3 Fatty Acids (FISH OIL) 500 MG CAPS, Take by mouth 3 (three) times a day, Disp: , Rfl:     Current Allergies     Allergies as of 12/21/2023 - Reviewed 12/21/2023   Allergen Reaction Noted    Dust mite extract Allergic Rhinitis 01/20/2022            The following portions of the patient's history were reviewed and updated as appropriate: allergies, current medications, past family history, past medical history, past social history, past surgical history and problem list.     Past Medical History:   Diagnosis Date    Blepharitis of eyelid of left eye        Past Surgical History:   Procedure Laterality Date    CATARACT EXTRACTION Bilateral     DENTAL SURGERY      [place] teeth extraction    HIP ARTHROPLASTY      Right    HIP ARTHROSCOPY      Left    TUBAL LIGATION         Family History   Problem Relation Age of Onset    Kidney cancer Mother     Lung cancer Father      No Known Problems Sister     No Known Problems Maternal Grandmother     No Known Problems Maternal Grandfather     No Known Problems Paternal Grandmother     No Known Problems Paternal Grandfather     Breast cancer Maternal Aunt         70's    No Known Problems Maternal Aunt     No Known Problems Maternal Aunt     No Known Problems Maternal Aunt     No Known Problems Maternal Aunt     No Known Problems Maternal Aunt     No Known Problems Paternal Aunt     Mental illness Cousin     Substance Abuse Neg Hx     Alcohol abuse Neg Hx          Medications have been verified.        Objective   /74   Pulse 77   Temp 97.6 °F (36.4 °C) (Tympanic)   Resp 18   Wt 84.9 kg (187 lb 3.2 oz)   SpO2 99%   BMI 35.37 kg/m²        Physical Exam     Physical Exam  Vitals and nursing note reviewed.   Constitutional:       General: She is not in acute distress.     Appearance: She is not ill-appearing.   HENT:      Head: Normocephalic.      Right Ear: Tympanic membrane, ear canal and external ear normal.      Left Ear: There is impacted cerumen.      Nose: Congestion and rhinorrhea present.      Mouth/Throat:      Mouth: Mucous membranes are moist.      Pharynx: Oropharynx is clear. No oropharyngeal exudate or posterior oropharyngeal erythema.   Eyes:      Conjunctiva/sclera: Conjunctivae normal.      Pupils: Pupils are equal, round, and reactive to light.   Cardiovascular:      Rate and Rhythm: Normal rate and regular rhythm.      Heart sounds: Normal heart sounds.   Pulmonary:      Effort: Pulmonary effort is normal.      Breath sounds: Normal breath sounds. No wheezing, rhonchi or rales.      Comments: Strong loose cough present.  Musculoskeletal:         General: Normal range of motion.      Cervical back: Normal range of motion and neck supple.   Skin:     General: Skin is warm and dry.      Capillary Refill: Capillary refill takes less than 2 seconds.   Neurological:      Mental Status: She is alert and oriented to  person, place, and time.

## 2023-12-21 NOTE — PATIENT INSTRUCTIONS
Start Tessalon for cough.    Continue using humidifier.    Follow-up with your PCP if no improvement in 3-5 days.    Go to the ED for any worsening symptoms.

## 2024-02-21 ENCOUNTER — HOSPITAL ENCOUNTER (OUTPATIENT)
Dept: MAMMOGRAPHY | Facility: IMAGING CENTER | Age: 69
Discharge: HOME | End: 2024-02-21
Payer: COMMERCIAL

## 2024-02-21 VITALS — WEIGHT: 157 LBS | HEIGHT: 61 IN | BODY MASS INDEX: 29.64 KG/M2

## 2024-02-21 DIAGNOSIS — Z12.31 ENCOUNTER FOR SCREENING MAMMOGRAM FOR MALIGNANT NEOPLASM OF BREAST: ICD-10-CM

## 2024-02-21 PROCEDURE — 77063 BREAST TOMOSYNTHESIS BI: CPT

## 2024-02-21 PROCEDURE — 77067 SCR MAMMO BI INCL CAD: CPT

## 2024-05-02 ENCOUNTER — OFFICE VISIT (OUTPATIENT)
Dept: PAIN MEDICINE | Facility: CLINIC | Age: 69
End: 2024-05-02
Payer: COMMERCIAL

## 2024-05-02 VITALS
HEART RATE: 68 BPM | HEIGHT: 61 IN | WEIGHT: 188 LBS | BODY MASS INDEX: 35.5 KG/M2 | DIASTOLIC BLOOD PRESSURE: 76 MMHG | TEMPERATURE: 98.1 F | SYSTOLIC BLOOD PRESSURE: 128 MMHG

## 2024-05-02 DIAGNOSIS — G89.29 CHRONIC BILATERAL LOW BACK PAIN WITHOUT SCIATICA: ICD-10-CM

## 2024-05-02 DIAGNOSIS — M54.9 MID BACK PAIN: ICD-10-CM

## 2024-05-02 DIAGNOSIS — M51.36 LUMBAR DEGENERATIVE DISC DISEASE: ICD-10-CM

## 2024-05-02 DIAGNOSIS — M54.50 CHRONIC BILATERAL LOW BACK PAIN WITHOUT SCIATICA: ICD-10-CM

## 2024-05-02 DIAGNOSIS — M47.816 LUMBAR SPONDYLOSIS: Primary | ICD-10-CM

## 2024-05-02 DIAGNOSIS — M79.18 MYOFASCIAL PAIN SYNDROME: ICD-10-CM

## 2024-05-02 PROCEDURE — 99214 OFFICE O/P EST MOD 30 MIN: CPT | Performed by: PHYSICIAN ASSISTANT

## 2024-05-02 PROCEDURE — G2211 COMPLEX E/M VISIT ADD ON: HCPCS | Performed by: PHYSICIAN ASSISTANT

## 2024-05-02 PROCEDURE — 1160F RVW MEDS BY RX/DR IN RCRD: CPT | Performed by: PHYSICIAN ASSISTANT

## 2024-05-02 PROCEDURE — 1159F MED LIST DOCD IN RCRD: CPT | Performed by: PHYSICIAN ASSISTANT

## 2024-05-02 RX ORDER — TIZANIDINE 2 MG/1
2 TABLET ORAL
Qty: 30 TABLET | Refills: 5 | Status: SHIPPED | OUTPATIENT
Start: 2024-05-02

## 2024-05-02 RX ORDER — MELOXICAM 7.5 MG/1
7.5 TABLET ORAL DAILY PRN
Qty: 30 TABLET | Refills: 5 | Status: SHIPPED | OUTPATIENT
Start: 2024-05-02

## 2024-05-02 NOTE — PROGRESS NOTES
Assessment:  1. Lumbar spondylosis    2. Lumbar degenerative disc disease    3. Chronic bilateral low back pain without sciatica    4. Mid back pain    5. Myofascial pain syndrome        Plan:  While the patient was in the office today, I did have a thorough conversation regarding their chronic pain syndrome, medication management, and treatment plan options.    The patient feels that the meloxicam provides moderate benefit therefore I will continue her on this medication.  I have electronically sent meloxicam 7.5 mg daily to her pharmacy with 5 additional refills.  I have also sent a prescription for tizanidine 2 mg nightly as needed tightness and spasm.  I advised the patient that they should not drive or operate machinery while on this medication until they see how it affects them, as it could cause lethargy and mental cloudiness. I advised the patient to call our office if they experience any side effects or issues with the medication changes. The patient verbalized an understanding.    I have recommended the patient resume an exercise program; she states that she likes aquatic therapy at the [facility] and will be restarting that in the near future.    The patient will follow-up in 6 months for medication prescription refill and reevaluation. The patient was advised to contact the office should their symptoms worsen in the interim. The patient was agreeable and verbalized an understanding.        History of Present Illness:    The patient is a 68 y.o. female last seen on 12/11/2023 who presents for a follow up office visit in regards to chronic low back pain secondary to facet arthropathy.  The patient currently reports increasing mid back pain and low back pain that she presently rates a 5 out of 10 and describes it as an occasional dull, aching and pressure-like pain.  Patient denies any lower extremity radicular pain.  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  The patient is looking forward to rejoining the [employer] and performing aquatic therapy which has been beneficial in the past.  Pain is significant with prolonged standing, bending but is also affecting her ability to sleep at night.    I have personally reviewed and/or updated the patient's past medical history, past surgical history, family history, social history, current medications, allergies, and vital signs today.       Review of Systems:    Review of Systems   Respiratory:  Positive for shortness of breath.    Cardiovascular:  Positive for chest pain.   Gastrointestinal:  Negative for constipation, diarrhea, nausea and vomiting.   Musculoskeletal:  Positive for gait problem. Negative for arthralgias, joint swelling and myalgias.   Skin:  Negative for rash.   Neurological:  Negative for dizziness, seizures and weakness.   All other systems reviewed and are negative.        Past Medical History:   Diagnosis Date   • Blepharitis of eyelid of left eye        Past Surgical History:   Procedure Laterality Date   • CATARACT EXTRACTION Bilateral    • DENTAL SURGERY      [place] teeth extraction   • HIP ARTHROPLASTY      Right   • HIP ARTHROSCOPY      Left   • TUBAL LIGATION         Family History   Problem Relation Age of Onset   • Kidney cancer Mother         80's   • Lung cancer Father         80's   • No Known Problems Sister    • No Known Problems Maternal Grandmother    • No Known Problems Maternal Grandfather    • No Known Problems Paternal Grandmother    • No Known Problems Paternal Grandfather    • No Known Problems Brother    • Breast cancer Maternal Aunt         70's   • No Known Problems Maternal Aunt    • No Known Problems Maternal Aunt    • No Known Problems Maternal Aunt    • No Known Problems Maternal Aunt    • No Known Problems Maternal Aunt    • No Known Problems Paternal Aunt    • Mental illness Cousin    •  "Substance Abuse Neg Hx    • Alcohol abuse Neg Hx        Social History     Occupational History   • Not on file   Tobacco Use   • Smoking status: Never   • Smokeless tobacco: Never   Vaping Use   • Vaping status: Never Used   Substance and Sexual Activity   • Alcohol use: No   • Drug use: No   • Sexual activity: Not on file         Current Outpatient Medications:   •  ascorbic acid (VITAMIN C) 500 mg tablet, Take 500 mg by mouth daily, Disp: , Rfl:   •  Calcium Citrate-Vitamin D (CALCIUM CITRATE CHEWY BITE PO), Take by mouth, Disp: , Rfl:   •  Flaxseed, Linseed, (FLAXSEED OIL PO), Take by mouth, Disp: , Rfl:   •  loratadine (CLARITIN) 10 mg tablet, Take 1 tablet (10 mg total) by mouth daily, Disp: 30 tablet, Rfl: 0  •  meloxicam (Mobic) 7.5 mg tablet, Take 1 tablet (7.5 mg total) by mouth daily as needed for mild pain or moderate pain, Disp: 30 tablet, Rfl: 5  •  multivitamin (THERAGRAN) TABS, Take 1 tablet by mouth daily, Disp: , Rfl:   •  Omega-3 Fatty Acids (FISH OIL) 500 MG CAPS, Take by mouth 3 (three) times a day, Disp: , Rfl:   •  tiZANidine (ZANAFLEX) 2 mg tablet, Take 1 tablet (2 mg total) by mouth daily at bedtime, Disp: 30 tablet, Rfl: 5  •  benzonatate (TESSALON) 200 MG capsule, Take 1 capsule (200 mg total) by mouth 3 (three) times a day as needed for cough (Patient not taking: Reported on 5/2/2024), Disp: 20 capsule, Rfl: 0  •  olopatadine (PATANOL) 0.1 % ophthalmic solution, 1 drop 2 (two) times a day (Patient not taking: Reported on 12/15/2023), Disp: , Rfl:     Allergies   Allergen Reactions   • Dust Mite Extract Allergic Rhinitis       Physical Exam:    /76 (BP Location: Left arm, Patient Position: Sitting, Cuff Size: Standard)   Pulse 68   Temp 98.1 °F (36.7 °C)   Ht 5' 1\" (1.549 m)   Wt 85.3 kg (188 lb)   BMI 35.52 kg/m²     Constitutional:normal, well developed, well nourished, alert, in no distress and non-toxic and no overt pain behavior.  Eyes:anicteric  HEENT:grossly " intact  Neck:supple, symmetric, trachea midline and no masses   Pulmonary:even and unlabored  Cardiovascular:No edema or pitting edema present  Skin:Normal without rashes or lesions and well hydrated  Psychiatric:Mood and affect appropriate  Neurologic:Cranial Nerves II-XII grossly intact  Musculoskeletal:normal      Imaging  No orders to display         No orders of the defined types were placed in this encounter.

## 2024-05-24 DIAGNOSIS — M79.18 MYOFASCIAL PAIN SYNDROME: ICD-10-CM

## 2024-05-28 RX ORDER — TIZANIDINE 2 MG/1
2 TABLET ORAL
Qty: 90 TABLET | Refills: 2 | Status: SHIPPED | OUTPATIENT
Start: 2024-05-28

## 2024-07-11 ENCOUNTER — OFFICE VISIT (OUTPATIENT)
Dept: FAMILY MEDICINE CLINIC | Facility: CLINIC | Age: 69
End: 2024-07-11
Payer: COMMERCIAL

## 2024-07-11 VITALS
BODY MASS INDEX: 35.87 KG/M2 | WEIGHT: 190 LBS | SYSTOLIC BLOOD PRESSURE: 118 MMHG | OXYGEN SATURATION: 95 % | HEIGHT: 61 IN | DIASTOLIC BLOOD PRESSURE: 76 MMHG | HEART RATE: 81 BPM | TEMPERATURE: 98 F | RESPIRATION RATE: 16 BRPM

## 2024-07-11 DIAGNOSIS — M54.50 LUMBAR BACK PAIN: ICD-10-CM

## 2024-07-11 DIAGNOSIS — F41.8 SITUATIONAL ANXIETY: Primary | ICD-10-CM

## 2024-07-11 DIAGNOSIS — M51.36 DEGENERATIVE DISC DISEASE, LUMBAR: ICD-10-CM

## 2024-07-11 PROCEDURE — G2211 COMPLEX E/M VISIT ADD ON: HCPCS | Performed by: PHYSICIAN ASSISTANT

## 2024-07-11 PROCEDURE — 99213 OFFICE O/P EST LOW 20 MIN: CPT | Performed by: PHYSICIAN ASSISTANT

## 2024-07-11 NOTE — PROGRESS NOTES
Assessment/Plan:    Situational anxiety - discussed at length  Lumbar back pain - seeing Dr Cuevas/Pain management    F/u as needed    Subjective:   Chief Complaint   Patient presents with    Anxiety     Needs a doctors note for housing      Patient ID: Katrina Vivas is a 68 y.o. female.    Under a lot of stress with living situation.     Also struggling in apartment because back pain too much not allowing her to open large stubborn windows in her room. Requesting a room with smaller windows. Has been limited in how much she can do do to back pain. Saw Dr Cuevas office once, tried some medications but minimal relief. Hips now bothering her bc of low back.        The following portions of the patient's history were reviewed and updated as appropriate: allergies, current medications, past family history, past medical history, past social history, past surgical history, and problem list.    Past Medical History:   Diagnosis Date    Blepharitis of eyelid of left eye      Past Surgical History:   Procedure Laterality Date    CATARACT EXTRACTION Bilateral     DENTAL SURGERY      [place] teeth extraction    HIP ARTHROPLASTY      Right    HIP ARTHROSCOPY      Left    TUBAL LIGATION       Family History   Problem Relation Age of Onset    Kidney cancer Mother         80's    Lung cancer Father         80's    No Known Problems Sister     No Known Problems Maternal Grandmother     No Known Problems Maternal Grandfather     No Known Problems Paternal Grandmother     No Known Problems Paternal Grandfather     No Known Problems Brother     Breast cancer Maternal Aunt         70's    No Known Problems Maternal Aunt     No Known Problems Maternal Aunt     No Known Problems Maternal Aunt     No Known Problems Maternal Aunt     No Known Problems Maternal Aunt     No Known Problems Paternal Aunt     Mental illness Cousin     Substance Abuse Neg Hx     Alcohol abuse Neg Hx      Social History     Socioeconomic History    Marital status:  "Single     Spouse name: Not on file    Number of children: Not on file    Years of education: Not on file    Highest education level: Not on file   Occupational History    Not on file   Tobacco Use    Smoking status: Never    Smokeless tobacco: Never   Vaping Use    Vaping status: Never Used   Substance and Sexual Activity    Alcohol use: No    Drug use: No    Sexual activity: Not on file   Other Topics Concern    Not on file   Social History Narrative    Always uses a seatbelt    Caffeine use- 1 cup of coffee per day    Has smoke detectors     Social Determinants of Health     Financial Resource Strain: Low Risk  (12/15/2023)    Overall Financial Resource Strain (CARDIA)     Difficulty of Paying Living Expenses: Not very hard   Food Insecurity: Not on file   Transportation Needs: No Transportation Needs (12/15/2023)    PRAPARE - Transportation     Lack of Transportation (Medical): No     Lack of Transportation (Non-Medical): No   Physical Activity: Not on file   Stress: Not on file   Social Connections: Unknown (6/18/2024)    Received from [employer]    Social [employer]     How often do you feel lonely or isolated from those around you? (Adult - for ages 18 years and over): Not on file   Intimate Partner Violence: Not on file   Housing Stability: Not on file       Current Outpatient Medications:     ascorbic acid (VITAMIN C) 500 mg tablet, Take 500 mg by mouth daily, Disp: , Rfl:     Calcium Citrate-Vitamin D (CALCIUM CITRATE CHEWY BITE PO), Take by mouth, Disp: , Rfl:     Flaxseed, Linseed, (FLAXSEED OIL PO), Take by mouth, Disp: , Rfl:     multivitamin (THERAGRAN) TABS, Take 1 tablet by mouth daily, Disp: , Rfl:     Review of Systems          Objective:    Vitals:    07/11/24 1228   BP: 118/76   Pulse: 81   Resp: 16   Temp: 98 °F (36.7 °C)   SpO2: 95%   Weight: 86.2 kg (190 lb)   Height: 5' 1\" (1.549 m)        Physical Exam  Constitutional:       Appearance: Normal appearance.   HENT:      Head: Normocephalic " and atraumatic.   Musculoskeletal:         General: Normal range of motion.   Neurological:      General: No focal deficit present.      Mental Status: She is alert and oriented to person, place, and time.   Psychiatric:         Mood and Affect: Mood normal.         Behavior: Behavior normal.         Thought Content: Thought content normal.         Judgment: Judgment normal.

## 2024-08-21 ENCOUNTER — OFFICE VISIT (OUTPATIENT)
Dept: FAMILY MEDICINE CLINIC | Facility: CLINIC | Age: 69
End: 2024-08-21
Payer: COMMERCIAL

## 2024-08-21 VITALS
DIASTOLIC BLOOD PRESSURE: 76 MMHG | HEART RATE: 74 BPM | BODY MASS INDEX: 36.25 KG/M2 | OXYGEN SATURATION: 98 % | RESPIRATION RATE: 16 BRPM | HEIGHT: 61 IN | TEMPERATURE: 97.5 F | WEIGHT: 192 LBS | SYSTOLIC BLOOD PRESSURE: 128 MMHG

## 2024-08-21 DIAGNOSIS — S01.312A EAR LOBE LACERATION, LEFT, INITIAL ENCOUNTER: Primary | ICD-10-CM

## 2024-08-21 PROCEDURE — G2211 COMPLEX E/M VISIT ADD ON: HCPCS | Performed by: PHYSICIAN ASSISTANT

## 2024-08-21 PROCEDURE — 99213 OFFICE O/P EST LOW 20 MIN: CPT | Performed by: PHYSICIAN ASSISTANT

## 2024-08-21 NOTE — PROGRESS NOTES
Assessment/Plan:    Skin laceration - offered plastics although would be cosmetic, otherwise keep area clean and the open skin will heal    F/u as needed    Subjective:   Chief Complaint   Patient presents with    Ear Problem     About a week ago pt woke up noticed that her earring through her left ear  Not painful or bleeding      Patient ID: Katrina Vivas is a 69 y.o. female.    Patient here because left earring ripped out of ear while sleeping a week ago. No pain.        The following portions of the patient's history were reviewed and updated as appropriate: allergies, current medications, past family history, past medical history, past social history, past surgical history, and problem list.    Past Medical History:   Diagnosis Date    Blepharitis of eyelid of left eye      Past Surgical History:   Procedure Laterality Date    CATARACT EXTRACTION Bilateral     DENTAL SURGERY      [place] teeth extraction    HIP ARTHROPLASTY      Right    HIP ARTHROSCOPY      Left    TUBAL LIGATION       Family History   Problem Relation Age of Onset    Kidney cancer Mother         80's    Lung cancer Father         80's    No Known Problems Sister     No Known Problems Maternal Grandmother     No Known Problems Maternal Grandfather     No Known Problems Paternal Grandmother     No Known Problems Paternal Grandfather     No Known Problems Brother     Breast cancer Maternal Aunt         70's    No Known Problems Maternal Aunt     No Known Problems Maternal Aunt     No Known Problems Maternal Aunt     No Known Problems Maternal Aunt     No Known Problems Maternal Aunt     No Known Problems Paternal Aunt     Mental illness Cousin     Substance Abuse Neg Hx     Alcohol abuse Neg Hx      Social History     Socioeconomic History    Marital status: Single     Spouse name: Not on file    Number of children: Not on file    Years of education: Not on file    Highest education level: Not on file   Occupational History    Not on file  "  Tobacco Use    Smoking status: Never    Smokeless tobacco: Never   Vaping Use    Vaping status: Never Used   Substance and Sexual Activity    Alcohol use: No    Drug use: No    Sexual activity: Not on file   Other Topics Concern    Not on file   Social History Narrative    Always uses a seatbelt    Caffeine use- 1 cup of coffee per day    Has smoke detectors     Social Determinants of Health     Financial Resource Strain: Low Risk  (12/15/2023)    Overall Financial Resource Strain (CARDIA)     Difficulty of Paying Living Expenses: Not very hard   Food Insecurity: Not on file   Transportation Needs: No Transportation Needs (12/15/2023)    PRAPARE - Transportation     Lack of Transportation (Medical): No     Lack of Transportation (Non-Medical): No   Physical Activity: Not on file   Stress: Not on file   Social Connections: Unknown (6/18/2024)    Received from [employer]    Social [employer]     How often do you feel lonely or isolated from those around you? (Adult - for ages 18 years and over): Not on file   Intimate Partner Violence: Not on file   Housing Stability: Not on file       Current Outpatient Medications:     ascorbic acid (VITAMIN C) 500 mg tablet, Take 500 mg by mouth daily, Disp: , Rfl:     Calcium Citrate-Vitamin D (CALCIUM CITRATE CHEWY BITE PO), Take by mouth, Disp: , Rfl:     Flaxseed, Linseed, (FLAXSEED OIL PO), Take by mouth, Disp: , Rfl:     multivitamin (THERAGRAN) TABS, Take 1 tablet by mouth daily, Disp: , Rfl:     Review of Systems          Objective:    Vitals:    08/21/24 0936   BP: 128/76   Pulse: 74   Resp: 16   Temp: 97.5 °F (36.4 °C)   TempSrc: Temporal   SpO2: 98%   Weight: 87.1 kg (192 lb)   Height: 5' 1\" (1.549 m)        Physical Exam  Constitutional:       Appearance: Normal appearance. She is well-developed and normal weight.   HENT:      Head: Normocephalic and atraumatic.      Ears:      Comments: Left ear piercing hole ripped through pinna, skin healed nicely, no signs " infection  Neck:      Vascular: No carotid bruit.   Cardiovascular:      Rate and Rhythm: Normal rate and regular rhythm.      Pulses: Normal pulses.      Heart sounds: Normal heart sounds.   Pulmonary:      Effort: Pulmonary effort is normal.      Breath sounds: Normal breath sounds.   Musculoskeletal:         General: Normal range of motion.      Cervical back: Normal range of motion and neck supple.      Right lower leg: No edema.      Left lower leg: No edema.   Skin:     General: Skin is warm.   Neurological:      General: No focal deficit present.      Mental Status: She is alert and oriented to person, place, and time.   Psychiatric:         Mood and Affect: Mood normal.         Behavior: Behavior normal.         Thought Content: Thought content normal.         Judgment: Judgment normal.

## 2024-09-22 ENCOUNTER — TELEPHONE (OUTPATIENT)
Dept: OTHER | Facility: OTHER | Age: 69
End: 2024-09-22

## 2024-09-23 ENCOUNTER — OFFICE VISIT (OUTPATIENT)
Dept: PAIN MEDICINE | Facility: CLINIC | Age: 69
End: 2024-09-23
Payer: COMMERCIAL

## 2024-09-23 VITALS
TEMPERATURE: 98 F | BODY MASS INDEX: 37.19 KG/M2 | SYSTOLIC BLOOD PRESSURE: 126 MMHG | DIASTOLIC BLOOD PRESSURE: 82 MMHG | WEIGHT: 197 LBS | HEIGHT: 61 IN | HEART RATE: 81 BPM

## 2024-09-23 DIAGNOSIS — M47.816 LUMBAR SPONDYLOSIS: Primary | ICD-10-CM

## 2024-09-23 DIAGNOSIS — M51.369 DDD (DEGENERATIVE DISC DISEASE), LUMBAR: ICD-10-CM

## 2024-09-23 DIAGNOSIS — M79.18 MYOFASCIAL PAIN SYNDROME: ICD-10-CM

## 2024-09-23 DIAGNOSIS — M51.36 DDD (DEGENERATIVE DISC DISEASE), LUMBAR: ICD-10-CM

## 2024-09-23 PROCEDURE — 99213 OFFICE O/P EST LOW 20 MIN: CPT | Performed by: PHYSICIAN ASSISTANT

## 2024-09-23 PROCEDURE — G2211 COMPLEX E/M VISIT ADD ON: HCPCS | Performed by: PHYSICIAN ASSISTANT

## 2024-09-23 NOTE — PROGRESS NOTES
Assessment:  1. Lumbar spondylosis    2. DDD (degenerative disc disease), lumbar    3. Myofascial pain syndrome        Plan:  While the patient was in the office today, I did have a thorough conversation regarding their chronic pain syndrome, medication management, and treatment plan options.    Lumbar spine MRI was reviewed again with the patient on today's visit.  I explained the patient that she has facet arthropathy causing her chronic low back pain.  We discussed treatment options to include anti-inflammatory medications, physical therapy/chiropractic therapy, aquatic therapy and injections.  I provided the patient with educational literature on lumbar medial branch blocks/radiofrequency ablation.    The patient at this time is not interested in any type of interventional treatment.  [facility]  I discussed the possibility of starting Celebrex however the patient would like to avoid it medication if possible.  I did provide her with literature on the Celebrex.    She will follow-up with us on a as needed basis.    The patient was advised to contact the office should their symptoms worsen in the interim. The patient was agreeable and verbalized an understanding.        History of Present Illness:    The patient is a 69 y.o. female last seen on 5/2/2024 who presents for a follow up office visit in regards to chronic low back pain secondary to lumbar spondylosis.  The patient currently reports low back pain that she rates a 1 out of 10 and describes it as an occasional throbbing type of pain.  The pain occasionally radiates into the mid back.  She denies any lower extremity radicular pain, paresthesias or weakness.  Patient discontinue meloxicam and tizanidine because she states they were causing sedation and dizziness.  She has previously attended physical therapy in the past which provided some benefit.  She is not interested in any type of injection  therapy or chiropractic therapy.  She recently started omega-3 supplement and would like to give that more time to see if that helps her back pain.  Patient also states that she will be hopefully restarting aquatic exercises at the [facility].    I have personally reviewed and/or updated the patient's past medical history, past surgical history, family history, social history, current medications, allergies, and vital signs today.       Review of Systems:    Review of Systems   Respiratory:  Negative for shortness of breath.    Cardiovascular:  Negative for chest pain.   Gastrointestinal:  Negative for constipation, diarrhea, nausea and vomiting.   Musculoskeletal:  Positive for gait problem. Negative for arthralgias, joint swelling (Joint stiffness) and myalgias.   Skin:  Negative for rash.   Neurological:  Negative for dizziness, seizures and weakness.   All other systems reviewed and are negative.        Past Medical History:   Diagnosis Date    Blepharitis of eyelid of left eye        Past Surgical History:   Procedure Laterality Date    CATARACT EXTRACTION Bilateral     DENTAL SURGERY      [place] teeth extraction    HIP ARTHROPLASTY      Right    HIP ARTHROSCOPY      Left    TUBAL LIGATION         Family History   Problem Relation Age of Onset    Kidney cancer Mother         80's    Lung cancer Father         80's    No Known Problems Sister     No Known Problems Maternal Grandmother     No Known Problems Maternal Grandfather     No Known Problems Paternal Grandmother     No Known Problems Paternal Grandfather     No Known Problems Brother     Breast cancer Maternal Aunt         70's    No Known Problems Maternal Aunt     No Known Problems Maternal Aunt     No Known Problems Maternal Aunt     No Known Problems Maternal Aunt     No Known Problems Maternal Aunt     No Known Problems Paternal Aunt     Mental illness Cousin     Substance Abuse Neg Hx     Alcohol abuse Neg Hx        Social History     Occupational History  "   Not on file   Tobacco Use    Smoking status: Never    Smokeless tobacco: Never   Vaping Use    Vaping status: Never Used   Substance and Sexual Activity    Alcohol use: No    Drug use: No    Sexual activity: Not on file         Current Outpatient Medications:     ascorbic acid (VITAMIN C) 500 mg tablet, Take 500 mg by mouth daily, Disp: , Rfl:     Calcium Citrate-Vitamin D (CALCIUM CITRATE CHEWY BITE PO), Take by mouth, Disp: , Rfl:     Flaxseed, Linseed, (FLAXSEED OIL PO), Take by mouth, Disp: , Rfl:     multivitamin (THERAGRAN) TABS, Take 1 tablet by mouth daily, Disp: , Rfl:     Allergies   Allergen Reactions    Dust Mite Extract Allergic Rhinitis       Physical Exam:    /82 (BP Location: Left arm, Patient Position: Sitting, Cuff Size: Large)   Pulse 81   Temp 98 °F (36.7 °C)   Ht 5' 1\" (1.549 m)   Wt 89.4 kg (197 lb)   BMI 37.22 kg/m²     Constitutional:normal, well developed, well nourished, alert, in no distress and non-toxic and no overt pain behavior. and overweight  Eyes:anicteric  HEENT:grossly intact  Neck:supple, symmetric, trachea midline and no masses   Pulmonary:even and unlabored  Cardiovascular:No edema or pitting edema present  Skin:Normal without rashes or lesions and well hydrated  Psychiatric:Mood and affect appropriate  Neurologic:Cranial Nerves II-XII grossly intact  Musculoskeletal: stable gait, tender bilateral lumbar facet joints.       Imaging  No orders to display         No orders of the defined types were placed in this encounter.      "

## 2024-10-05 ENCOUNTER — TELEPHONE (OUTPATIENT)
Dept: OTHER | Facility: OTHER | Age: 69
End: 2024-10-05

## 2024-10-07 ENCOUNTER — TELEPHONE (OUTPATIENT)
Age: 69
End: 2024-10-07

## 2024-10-07 NOTE — TELEPHONE ENCOUNTER
Pt is looking to see if Denise knows of anyone that can sew up her ear lobe as she'd like to be able to wear her earrings again.     Please contact to advise.

## 2024-10-08 ENCOUNTER — CLINICAL SUPPORT (OUTPATIENT)
Dept: FAMILY MEDICINE CLINIC | Facility: CLINIC | Age: 69
End: 2024-10-08
Payer: COMMERCIAL

## 2024-10-08 DIAGNOSIS — Z23 ENCOUNTER FOR IMMUNIZATION: Primary | ICD-10-CM

## 2024-10-08 PROCEDURE — G0008 ADMIN INFLUENZA VIRUS VAC: HCPCS

## 2024-10-08 PROCEDURE — 90662 IIV NO PRSV INCREASED AG IM: CPT

## 2024-10-09 ENCOUNTER — OFFICE VISIT (OUTPATIENT)
Dept: FAMILY MEDICINE CLINIC | Facility: CLINIC | Age: 69
End: 2024-10-09
Payer: COMMERCIAL

## 2024-10-09 VITALS
RESPIRATION RATE: 18 BRPM | OXYGEN SATURATION: 97 % | WEIGHT: 194.2 LBS | DIASTOLIC BLOOD PRESSURE: 80 MMHG | BODY MASS INDEX: 36.67 KG/M2 | SYSTOLIC BLOOD PRESSURE: 122 MMHG | TEMPERATURE: 97.9 F | HEART RATE: 76 BPM | HEIGHT: 61 IN

## 2024-10-09 DIAGNOSIS — H61.23 HEARING LOSS OF BOTH EARS DUE TO CERUMEN IMPACTION: Primary | ICD-10-CM

## 2024-10-09 PROCEDURE — 69210 REMOVE IMPACTED EAR WAX UNI: CPT | Performed by: PHYSICIAN ASSISTANT

## 2024-10-09 PROCEDURE — 99213 OFFICE O/P EST LOW 20 MIN: CPT | Performed by: PHYSICIAN ASSISTANT

## 2024-10-09 NOTE — PROGRESS NOTES
Assessment/Plan:    Hearing loss due to cerumen impaction - cerumen removed b/l without complication    F/u as needed    Subjective:   Chief Complaint   Patient presents with    Cerumen Impaction      Patient ID: Katrina Vivas is a 69 y.o. female.    Patient here complaining of left ear feeling funny, like something is in it, trouble hearing. No pain.         The following portions of the patient's history were reviewed and updated as appropriate: allergies, current medications, past family history, past medical history, past social history, past surgical history, and problem list.    Past Medical History:   Diagnosis Date    Blepharitis of eyelid of left eye      Past Surgical History:   Procedure Laterality Date    CATARACT EXTRACTION Bilateral     DENTAL SURGERY      [place] teeth extraction    HIP ARTHROPLASTY      Right    HIP ARTHROSCOPY      Left    TUBAL LIGATION       Family History   Problem Relation Age of Onset    Kidney cancer Mother         80's    Lung cancer Father         80's    No Known Problems Sister     No Known Problems Maternal Grandmother     No Known Problems Maternal Grandfather     No Known Problems Paternal Grandmother     No Known Problems Paternal Grandfather     No Known Problems Brother     Breast cancer Maternal Aunt         70's    No Known Problems Maternal Aunt     No Known Problems Maternal Aunt     No Known Problems Maternal Aunt     No Known Problems Maternal Aunt     No Known Problems Maternal Aunt     No Known Problems Paternal Aunt     Mental illness Cousin     Substance Abuse Neg Hx     Alcohol abuse Neg Hx      Social History     Socioeconomic History    Marital status: Single     Spouse name: Not on file    Number of children: Not on file    Years of education: Not on file    Highest education level: Not on file   Occupational History    Not on file   Tobacco Use    Smoking status: Never    Smokeless tobacco: Never   Vaping Use    Vaping status: Never Used   Substance  "and Sexual Activity    Alcohol use: No    Drug use: No    Sexual activity: Not on file   Other Topics Concern    Not on file   Social History Narrative    Always uses a seatbelt    Caffeine use- 1 cup of coffee per day    Has smoke detectors     Social Determinants of Health     Financial Resource Strain: Low Risk  (12/15/2023)    Overall Financial Resource Strain (CARDIA)     Difficulty of Paying Living Expenses: Not very hard   Food Insecurity: Not on file   Transportation Needs: No Transportation Needs (12/15/2023)    PRAPARE - Transportation     Lack of Transportation (Medical): No     Lack of Transportation (Non-Medical): No   Physical Activity: Not on file   Stress: Not on file   Social Connections: Unknown (6/18/2024)    Received from [employer]     How often do you feel lonely or isolated from those around you? (Adult - for ages 18 years and over): Not on file   Intimate Partner Violence: Not on file   Housing Stability: Not on file       Current Outpatient Medications:     ascorbic acid (VITAMIN C) 500 mg tablet, Take 500 mg by mouth daily, Disp: , Rfl:     Calcium Citrate-Vitamin D (CALCIUM CITRATE CHEWY BITE PO), Take by mouth, Disp: , Rfl:     Flaxseed, Linseed, (FLAXSEED OIL PO), Take by mouth, Disp: , Rfl:     multivitamin (THERAGRAN) TABS, Take 1 tablet by mouth daily, Disp: , Rfl:     Review of Systems          Objective:    Vitals:    10/09/24 1115   BP: 122/80   Pulse: 76   Resp: 18   Temp: 97.9 °F (36.6 °C)   SpO2: 97%   Weight: 88.1 kg (194 lb 3.2 oz)   Height: 5' 1\" (1.549 m)        Physical Exam  Constitutional:       Appearance: Normal appearance.   HENT:      Head: Normocephalic and atraumatic.      Right Ear: There is impacted cerumen.      Left Ear: There is impacted cerumen.   Musculoskeletal:         General: Normal range of motion.   Skin:     General: Skin is warm.   Neurological:      General: No focal deficit present.      Mental Status: She is alert and oriented " to person, place, and time.   Psychiatric:         Mood and Affect: Mood normal.         Behavior: Behavior normal.         Thought Content: Thought content normal.         Judgment: Judgment normal.             Ear cerumen removal    Date/Time: 10/9/2024 11:15 AM    Performed by: Denise Noriega PA-C  Authorized by: Denise Noriega PA-C  Universal Protocol:  Consent: Verbal consent obtained. Written consent obtained.  Risks and benefits: risks, benefits and alternatives were discussed  Consent given by: patient  Patient understanding: patient states understanding of the procedure being performed  Patient identity confirmed: verbally with patient    Patient location:  Other (comment)  Procedure details:     Local anesthetic:  None    Location:  Both ears    Procedure type: irrigation with instrumentation      Instrumentation: curette      Approach:  Natural orifice    Visualization (free text):  B/l TM occluded, s/p procedure TM intact b/l

## 2024-10-13 ENCOUNTER — RA CDI HCC (OUTPATIENT)
Dept: OTHER | Facility: HOSPITAL | Age: 69
End: 2024-10-13

## 2024-10-19 ENCOUNTER — TELEPHONE (OUTPATIENT)
Dept: OTHER | Facility: OTHER | Age: 69
End: 2024-10-19

## 2024-10-25 ENCOUNTER — TELEPHONE (OUTPATIENT)
Age: 69
End: 2024-10-25

## 2024-10-25 NOTE — TELEPHONE ENCOUNTER
Patient inquired about earlobe repair.    Advised of it being a cosmetic visit and that the cost for consult would be $150 then each lobe would cost $350

## 2024-11-15 ENCOUNTER — TELEPHONE (OUTPATIENT)
Age: 69
End: 2024-11-15

## 2024-11-15 NOTE — TELEPHONE ENCOUNTER
Patient is requesting Dermatology referral to sow her left ear lobe. Provided her with Dermatology phone # please reach out to her for any other recommendations.    Patient stated that [facility] gave quote $150 for office visit and $350 for Sowing. She is wondering if you can suggest someone else who is less expensive.    Thank you!!

## 2024-11-18 NOTE — TELEPHONE ENCOUNTER
LM informing patient. Left contact info for [facility]

## 2024-12-17 ENCOUNTER — OFFICE VISIT (OUTPATIENT)
Dept: FAMILY MEDICINE CLINIC | Facility: CLINIC | Age: 69
End: 2024-12-17
Payer: COMMERCIAL

## 2024-12-17 VITALS
RESPIRATION RATE: 16 BRPM | OXYGEN SATURATION: 98 % | SYSTOLIC BLOOD PRESSURE: 122 MMHG | HEIGHT: 61 IN | HEART RATE: 88 BPM | TEMPERATURE: 97.8 F | BODY MASS INDEX: 37.19 KG/M2 | DIASTOLIC BLOOD PRESSURE: 72 MMHG | WEIGHT: 197 LBS

## 2024-12-17 DIAGNOSIS — Z78.0 POST-MENOPAUSAL: ICD-10-CM

## 2024-12-17 DIAGNOSIS — Z13.220 SCREENING, LIPID: ICD-10-CM

## 2024-12-17 DIAGNOSIS — Z00.00 MEDICARE ANNUAL WELLNESS VISIT, SUBSEQUENT: Primary | ICD-10-CM

## 2024-12-17 DIAGNOSIS — R53.83 FATIGUE, UNSPECIFIED TYPE: ICD-10-CM

## 2024-12-17 DIAGNOSIS — Z12.31 ENCOUNTER FOR SCREENING MAMMOGRAM FOR BREAST CANCER: ICD-10-CM

## 2024-12-17 DIAGNOSIS — R74.8 ELEVATED ALKALINE PHOSPHATASE LEVEL: ICD-10-CM

## 2024-12-17 PROCEDURE — G0439 PPPS, SUBSEQ VISIT: HCPCS | Performed by: PHYSICIAN ASSISTANT

## 2024-12-17 NOTE — PATIENT INSTRUCTIONS
Medicare Preventive Visit Patient Instructions  Thank you for completing your Welcome to Medicare Visit or Medicare Annual Wellness Visit today. Your next wellness visit will be due in one year (12/18/2025).  The screening/preventive services that you may require over the next 5-10 years are detailed below. Some tests may not apply to you based off risk factors and/or age. Screening tests ordered at today's visit but not completed yet may show as past due. Also, please note that scanned in results may not display below.  Preventive Screenings:  Service Recommendations Previous Testing/Comments   Colorectal Cancer Screening  * Colonoscopy    * Fecal Occult Blood Test (FOBT)/Fecal Immunochemical Test (FIT)  * Fecal DNA/Cologuard Test  * Flexible Sigmoidoscopy Age: 45-75 years old   Colonoscopy: every 10 years (may be performed more frequently if at higher risk)  OR  FOBT/FIT: every 1 year  OR  Cologuard: every 3 years  OR  Sigmoidoscopy: every 5 years  Screening may be recommended earlier than age 45 if at higher risk for colorectal cancer. Also, an individualized decision between you and your healthcare provider will decide whether screening between the ages of 76-85 would be appropriate. Colonoscopy: 09/29/2015  FOBT/FIT: Not on file  Cologuard: Not on file  Sigmoidoscopy: Not on file    Screening Current     Breast Cancer Screening Age: 40+ years old  Frequency: every 1-2 years  Not required if history of left and right mastectomy Mammogram: 02/21/2024    Screening Current   Cervical Cancer Screening Between the ages of 21-29, pap smear recommended once every 3 years.   Between the ages of 30-65, can perform pap smear with HPV co-testing every 5 years.   Recommendations may differ for women with a history of total hysterectomy, cervical cancer, or abnormal pap smears in past. Pap Smear: 08/09/2016    Screening Not Indicated   Hepatitis C Screening Once for adults born between 1945 and 1965  More frequently in  patients at high risk for Hepatitis C Hep C Antibody: 01/06/2012    Screening Current   Diabetes Screening 1-2 times per year if you're at risk for diabetes or have pre-diabetes Fasting glucose: 88 mg/dL (3/9/2021)  A1C: No results in last 5 years (No results in last 5 years)      Cholesterol Screening Once every 5 years if you don't have a lipid disorder. May order more often based on risk factors. Lipid panel: 03/09/2021    Screening Current     Other Preventive Screenings Covered by Medicare:  Abdominal Aortic Aneurysm (AAA) Screening: covered once if your at risk. You're considered to be at risk if you have a family history of AAA.  Lung Cancer Screening: covers low dose CT scan once per year if you meet all of the following conditions: (1) Age 55-77; (2) No signs or symptoms of lung cancer; (3) Current smoker or have quit smoking within the last 15 years; (4) You have a tobacco smoking history of at least 20 pack years (packs per day multiplied by number of years you smoked); (5) You get a written order from a healthcare provider.  Glaucoma Screening: covered annually if you're considered high risk: (1) You have diabetes OR (2) Family history of glaucoma OR (3)  aged 50 and older OR (4)  American aged 65 and older  Osteoporosis Screening: covered every 2 years if you meet one of the following conditions: (1) You're estrogen deficient and at risk for osteoporosis based off medical history and other findings; (2) Have a vertebral abnormality; (3) On glucocorticoid therapy for more than 3 months; (4) Have primary hyperparathyroidism; (5) On osteoporosis medications and need to assess response to drug therapy.   Last bone density test (DXA Scan): 11/12/2020.  HIV Screening: covered annually if you're between the age of 15-65. Also covered annually if you are younger than 15 and older than 65 with risk factors for HIV infection. For pregnant patients, it is covered up to 3 times per  pregnancy.    Immunizations:  Immunization Recommendations   Influenza Vaccine Annual influenza vaccination during flu season is recommended for all persons aged >= 6 months who do not have contraindications   Pneumococcal Vaccine   * Pneumococcal conjugate vaccine = PCV13 (Prevnar 13), PCV15 (Vaxneuvance), PCV20 (Prevnar 20)  * Pneumococcal polysaccharide vaccine = PPSV23 (Pneumovax) Adults 19-63 yo with certain risk factors or if 65+ yo  If never received any pneumonia vaccine: recommend Prevnar 20 (PCV20)  Give PCV20 if previously received 1 dose of PCV13 or PPSV23   Hepatitis B Vaccine 3 dose series if at intermediate or high risk (ex: diabetes, end stage renal disease, liver disease)   Respiratory syncytial virus (RSV) Vaccine - COVERED BY MEDICARE PART D  * RSVPreF3 (Arexvy) CDC recommends that adults 60 years of age and older may receive a single dose of RSV vaccine using shared clinical decision-making (SCDM)   Tetanus (Td) Vaccine - COST NOT COVERED BY MEDICARE PART B Following completion of primary series, a booster dose should be given every 10 years to maintain immunity against tetanus. Td may also be given as tetanus wound prophylaxis.   Tdap Vaccine - COST NOT COVERED BY MEDICARE PART B Recommended at least once for all adults. For pregnant patients, recommended with each pregnancy.   Shingles Vaccine (Shingrix) - COST NOT COVERED BY MEDICARE PART B  2 shot series recommended in those 19 years and older who have or will have weakened immune systems or those 50 years and older     Health Maintenance Due:      Topic Date Due   • DXA SCAN  11/12/2023   • Breast Cancer Screening: Mammogram  02/21/2025   • Colorectal Cancer Screening  09/29/2025   • Hepatitis C Screening  Completed   • Cervical Cancer Screening  Discontinued     Immunizations Due:      Topic Date Due   • COVID-19 Vaccine (6 - 2024-25 season) 09/01/2024     Advance Directives   What are advance directives?  Advance directives are legal  documents that state your wishes and plans for medical care. These plans are made ahead of time in case you lose your ability to make decisions for yourself. Advance directives can apply to any medical decision, such as the treatments you want, and if you want to donate organs.   What are the types of advance directives?  There are many types of advance directives, and each state has rules about how to use them. You may choose a combination of any of the following:  Living will:  This is a written record of the treatment you want. You can also choose which treatments you do not want, which to limit, and which to stop at a certain time. This includes surgery, medicine, IV fluid, and tube feedings.   Durable power of  for healthcare (DPAHC):  This is a written record that states who you want to make healthcare choices for you when you are unable to make them for yourself. This person, called a proxy, is usually a family member or a friend. You may choose more than 1 proxy.  Do not resuscitate (DNR) order:  A DNR order is used in case your heart stops beating or you stop breathing. It is a request not to have certain forms of treatment, such as CPR. A DNR order may be included in other types of advance directives.  Medical directive:  This covers the care that you want if you are in a coma, near death, or unable to make decisions for yourself. You can list the treatments you want for each condition. Treatment may include pain medicine, surgery, blood transfusions, dialysis, IV or tube feedings, and a ventilator (breathing machine).  Values history:  This document has questions about your views, beliefs, and how you feel and think about life. This information can help others choose the care that you would choose.  Why are advance directives important?  An advance directive helps you control your care. Although spoken wishes may be used, it is better to have your wishes written down. Spoken wishes can be  misunderstood, or not followed. Treatments may be given even if you do not want them. An advance directive may make it easier for your family to make difficult choices about your care.   Weight Management   Why it is important to manage your weight:  Being overweight increases your risk of health conditions such as heart disease, high blood pressure, type 2 diabetes, and certain types of cancer. It can also increase your risk for osteoarthritis, sleep apnea, and other respiratory problems. Aim for a slow, steady weight loss. Even a small amount of weight loss can lower your risk of health problems.  How to lose weight safely:  A safe and healthy way to lose weight is to eat fewer calories and get regular exercise. You can lose up about 1 pound a week by decreasing the number of calories you eat by 500 calories each day.   Healthy meal plan for weight management:  A healthy meal plan includes a variety of foods, contains fewer calories, and helps you stay healthy. A healthy meal plan includes the following:  Eat whole-grain foods more often.  A healthy meal plan should contain fiber. Fiber is the part of grains, fruits, and vegetables that is not broken down by your body. Whole-grain foods are healthy and provide extra fiber in your diet. Some examples of whole-grain foods are whole-wheat breads and pastas, oatmeal, brown rice, and bulgur.  Eat a variety of vegetables every day.  Include dark, leafy greens such as spinach, kale, mata greens, and mustard greens. Eat yellow and orange vegetables such as carrots, sweet potatoes, and winter squash.   Eat a variety of fruits every day.  Choose fresh or canned fruit (canned in its own juice or light syrup) instead of juice. Fruit juice has very little or no fiber.  Eat low-fat dairy foods.  Drink fat-free (skim) milk or 1% milk. Eat fat-free yogurt and low-fat cottage cheese. Try low-fat cheeses such as mozzarella and other reduced-fat cheeses.  Choose meat and other  protein foods that are low in fat.  Choose beans or other legumes such as split peas or lentils. Choose fish, skinless poultry (chicken or turkey), or lean cuts of red meat (beef or pork). Before you cook meat or poultry, cut off any visible fat.   Use less fat and oil.  Try baking foods instead of frying them. Add less fat, such as margarine, sour cream, regular salad dressing and mayonnaise to foods. Eat fewer high-fat foods. Some examples of high-fat foods include french fries, doughnuts, ice cream, and cakes.  Eat fewer sweets.  Limit foods and drinks that are high in sugar. This includes candy, cookies, regular soda, and sweetened drinks.  Exercise:  Exercise at least 30 minutes per day on most days of the week. Some examples of exercise include walking, biking, dancing, and swimming. You can also fit in more physical activity by taking the stairs instead of the elevator or parking farther away from stores. Ask your healthcare provider about the best exercise plan for you.      © Copyright [employer] 2018 Information is for End User's use only and may not be sold, redistributed or otherwise used for commercial purposes. All illustrations and images included in CareNotes® are the copyrighted property of [employer] or [employer]

## 2024-12-17 NOTE — PROGRESS NOTES
Name: Katrina Vivas      : 1955      MRN: [id number]  Encounter Provider: Denise Noriega PA-C  Encounter Date: 2024   Encounter department: [facility]    Assessment & Plan  Medicare annual wellness visit, subsequent         Post-menopausal    Orders:    DXA bone density spine hip and pelvis; Future    Encounter for screening mammogram for breast cancer    Orders:    Mammo screening bilateral w 3d and cad; Future       Preventive health issues were discussed with patient, and age appropriate screening tests were ordered as noted in patient's After Visit Summary. Personalized health advice and appropriate referrals for health education or preventive services given if needed, as noted in patient's After Visit Summary.    History of Present Illness     HPI   Patient Care Team:  Densie Noriega PA-C as PCP - General (Family Medicine)  Julieta Lora MD as PCP - [facility]  Julieta Lora MD as PCP - PCP-[facility] ([facility])  Julieta Lora MD as PCP - PCP-[facility] ([facility])  YENNY Adams MD    Review of Systems   Constitutional: Negative.    HENT: Negative.     Eyes: Negative.    Respiratory: Negative.     Cardiovascular: Negative.    Gastrointestinal: Negative.    Endocrine: Negative.    Genitourinary: Negative.    Musculoskeletal: Negative.    Skin: Negative.    Allergic/Immunologic: Negative.    Neurological: Negative.    Hematological: Negative.    Psychiatric/Behavioral: Negative.       Medical History Reviewed by provider this encounter:       Annual Wellness Visit Questionnaire   Katrina is here for her Subsequent Wellness visit.     Health Risk Assessment:   Patient rates overall health as very good. Patient feels that their physical health rating is same. Patient is very satisfied with their life. Eyesight was rated as same. Hearing was rated as same. Patient feels that their emotional and  mental health rating is same. Patients states they are never, rarely angry. Patient states they are never, rarely unusually tired/fatigued. Pain experienced in the last 7 days has been none. Patient states that she has experienced no weight loss or gain in last 6 months.     Depression Screening:   PHQ-2 Score: 0      Fall Risk Screening:   In the past year, patient has experienced: no history of falling in past year      Urinary Incontinence Screening:   Patient has not leaked urine accidently in the last six months.     Home Safety:  Patient does not have trouble with stairs inside or outside of their home. Patient has working smoke alarms and has working carbon monoxide detector. Home safety hazards include: none.     Nutrition:   Current diet is Regular.     Medications:   Patient is currently taking over-the-counter supplements. OTC medications include: see medication list. Patient is able to manage medications.     Activities of Daily Living (ADLs)/Instrumental Activities of Daily Living (IADLs):   Walk and transfer into and out of bed and chair?: Yes  Dress and groom yourself?: Yes    Bathe or shower yourself?: Yes    Feed yourself? Yes  Do your laundry/housekeeping?: Yes  Manage your money, pay your bills and track your expenses?: Yes  Make your own meals?: Yes    Do your own shopping?: Yes    Previous Hospitalizations:   Any hospitalizations or ED visits within the last 12 months?: No      Advance Care Planning:   Living will: Yes    Advanced directive: Yes    End of Life Decisions reviewed with patient: Yes    Provider agrees with end of life decisions: Yes      Cognitive Screening:   Provider or family/friend/caregiver concerned regarding cognition?: No    PREVENTIVE SCREENINGS      Cardiovascular Screening:    General: Screening Current and Risks and Benefits Discussed    Due for: Lipid Panel      Diabetes Screening:     General: Risks and Benefits Discussed    Due for: Blood Glucose      Colorectal  Cancer Screening:     General: Screening Current      Breast Cancer Screening:     General: Screening Current      Cervical Cancer Screening:    General: Screening Not Indicated      Osteoporosis Screening:    General: Risks and Benefits Discussed    Due for: DXA Axial      Abdominal Aortic Aneurysm (AAA) Screening:        General: Screening Not Indicated      Lung Cancer Screening:     General: Screening Not Indicated      Hepatitis C Screening:    General: Screening Current    Screening, Brief Intervention, and Referral to Treatment (SBIRT)    Screening  Typical number of drinks in a day: 0  Typical number of drinks in a week: 0  Interpretation: Low risk drinking behavior.    Single Item Drug Screening:  How often have you used an illegal drug (including marijuana) or a prescription medication for non-medical reasons in the past year? never    Single Item Drug Screen Score: 0  Interpretation: Negative screen for possible drug use disorder    Brief Intervention  Alcohol & drug use screenings were reviewed. No concerns regarding substance use disorder identified.     Other Counseling Topics:   Car/seat belt/driving safety, skin self-exam, sunscreen and calcium and vitamin D intake and regular weightbearing exercise.     Social Drivers of Health     Financial Resource Strain: Low Risk  (12/15/2023)    Overall Financial Resource Strain (CARDIA)     Difficulty of Paying Living Expenses: Not very hard   Transportation Needs: No Transportation Needs (12/15/2023)    PRAPARE - Transportation     Lack of Transportation (Medical): No     Lack of Transportation (Non-Medical): No    Received from [employer]     No results found.    Objective   There were no vitals taken for this visit.    Physical Exam  Constitutional:       Appearance: Normal appearance. She is well-developed and normal weight.   HENT:      Head: Normocephalic and atraumatic.      Right Ear: Hearing and external ear normal.      Left Ear: Hearing  and external ear normal.      Mouth/Throat:      Pharynx: Uvula midline.   Eyes:      Extraocular Movements: Extraocular movements intact.      Conjunctiva/sclera: Conjunctivae normal.      Pupils: Pupils are equal, round, and reactive to light.   Neck:      Thyroid: No thyromegaly.   Cardiovascular:      Rate and Rhythm: Normal rate and regular rhythm.      Pulses: Normal pulses.      Heart sounds: Normal heart sounds. No murmur heard.  Pulmonary:      Effort: Pulmonary effort is normal.      Breath sounds: Normal breath sounds.   Abdominal:      General: Abdomen is flat. Bowel sounds are normal. There is no distension.      Palpations: Abdomen is soft. There is no mass.      Tenderness: There is no abdominal tenderness.   Musculoskeletal:         General: Normal range of motion.      Cervical back: Normal range of motion and neck supple.   Lymphadenopathy:      Cervical: No cervical adenopathy.   Skin:     General: Skin is warm.   Neurological:      General: No focal deficit present.      Mental Status: She is alert and oriented to person, place, and time.      Cranial Nerves: No cranial nerve deficit.      Deep Tendon Reflexes: Reflexes normal.   Psychiatric:         Mood and Affect: Mood normal.         Behavior: Behavior normal.         Thought Content: Thought content normal.         Judgment: Judgment normal.

## 2025-02-10 ENCOUNTER — OFFICE VISIT (OUTPATIENT)
Dept: FAMILY MEDICINE CLINIC | Facility: CLINIC | Age: 70
End: 2025-02-10
Payer: COMMERCIAL

## 2025-02-10 VITALS
BODY MASS INDEX: 36.06 KG/M2 | RESPIRATION RATE: 18 BRPM | TEMPERATURE: 98.2 F | HEIGHT: 61 IN | SYSTOLIC BLOOD PRESSURE: 118 MMHG | DIASTOLIC BLOOD PRESSURE: 74 MMHG | HEART RATE: 70 BPM | WEIGHT: 191 LBS | OXYGEN SATURATION: 97 %

## 2025-02-10 DIAGNOSIS — J02.9 SORE THROAT: Primary | ICD-10-CM

## 2025-02-10 DIAGNOSIS — J02.0 STREP PHARYNGITIS: ICD-10-CM

## 2025-02-10 DIAGNOSIS — B34.9 VIRAL ILLNESS: ICD-10-CM

## 2025-02-10 LAB
S PYO AG THROAT QL: POSITIVE
SARS-COV-2 AG UPPER RESP QL IA: NEGATIVE
VALID CONTROL: NORMAL

## 2025-02-10 PROCEDURE — 99213 OFFICE O/P EST LOW 20 MIN: CPT | Performed by: PHYSICIAN ASSISTANT

## 2025-02-10 PROCEDURE — G2211 COMPLEX E/M VISIT ADD ON: HCPCS | Performed by: PHYSICIAN ASSISTANT

## 2025-02-10 PROCEDURE — 87811 SARS-COV-2 COVID19 W/OPTIC: CPT | Performed by: PHYSICIAN ASSISTANT

## 2025-02-10 PROCEDURE — 87880 STREP A ASSAY W/OPTIC: CPT | Performed by: PHYSICIAN ASSISTANT

## 2025-02-10 RX ORDER — AMOXICILLIN 500 MG/1
500 CAPSULE ORAL EVERY 8 HOURS SCHEDULED
Qty: 30 CAPSULE | Refills: 0 | Status: SHIPPED | OUTPATIENT
Start: 2025-02-10 | End: 2025-02-20

## 2025-02-10 NOTE — PROGRESS NOTES
"Name: Katrina Vivas      : 1955      MRN: [id number]  Encounter Provider: Denise Noriega PA-C  Encounter Date: 2/10/2025   Encounter department: [facility]  :  Assessment & Plan  Strep pharyngitis  Gargle with warm salt water, fluids and rest, amoxil tid x 10 days  Orders:    amoxicillin (AMOXIL) 500 mg capsule; Take 1 capsule (500 mg total) by mouth every 8 (eight) hours for 10 days    F/u as needed       History of Present Illness   Patient here complaining of feeling sick since Tuesday, cough, congestion, sore throat. Taking robitussin with honey 4 x a day with some relief.      Review of Systems    Objective   /74   Pulse 70   Temp 98.2 °F (36.8 °C)   Resp 18   Ht 5' 1\" (1.549 m)   Wt 86.6 kg (191 lb)   SpO2 97%   BMI 36.09 kg/m²      Physical Exam  Constitutional:       General: She is not in acute distress.     Appearance: Normal appearance. She is well-developed. She is not toxic-appearing.   HENT:      Head: Normocephalic and atraumatic.      Right Ear: Tympanic membrane, ear canal and external ear normal.      Left Ear: Tympanic membrane, ear canal and external ear normal.      Nose: Mucosal edema, congestion and rhinorrhea present.      Mouth/Throat:      Mouth: Mucous membranes are moist.      Pharynx: Oropharynx is clear. No oropharyngeal exudate or posterior oropharyngeal erythema.   Eyes:      Extraocular Movements: Extraocular movements intact.      Conjunctiva/sclera: Conjunctivae normal.   Cardiovascular:      Rate and Rhythm: Normal rate and regular rhythm.      Pulses: Normal pulses.      Heart sounds: Normal heart sounds.   Pulmonary:      Effort: Pulmonary effort is normal.      Breath sounds: Normal breath sounds.   Musculoskeletal:         General: Normal range of motion.      Cervical back: Normal range of motion and neck supple. No rigidity or tenderness.   Lymphadenopathy:      Cervical: No cervical adenopathy.   Skin:   "   General: Skin is warm.   Neurological:      General: No focal deficit present.      Mental Status: She is alert and oriented to person, place, and time.   Psychiatric:         Mood and Affect: Mood normal.         Behavior: Behavior normal.         Thought Content: Thought content normal.         Judgment: Judgment normal.

## 2025-03-20 ENCOUNTER — TELEPHONE (OUTPATIENT)
Dept: FAMILY MEDICINE CLINIC | Facility: CLINIC | Age: 70
End: 2025-03-20

## 2025-03-20 NOTE — TELEPHONE ENCOUNTER
Spoke to patient and office, advised patient her form would most likely be ready next week, advised to give a call back on Tuesday. Patient wanted to thank the office for their help.

## 2025-03-26 ENCOUNTER — HOSPITAL ENCOUNTER (OUTPATIENT)
Dept: BONE DENSITY | Facility: IMAGING CENTER | Age: 70
End: 2025-03-26
Payer: COMMERCIAL

## 2025-03-26 ENCOUNTER — HOSPITAL ENCOUNTER (OUTPATIENT)
Dept: MAMMOGRAPHY | Facility: IMAGING CENTER | Age: 70
Discharge: HOME | End: 2025-03-26
Payer: COMMERCIAL

## 2025-03-26 VITALS — WEIGHT: 160 LBS | BODY MASS INDEX: 30.21 KG/M2 | HEIGHT: 61 IN

## 2025-03-26 DIAGNOSIS — Z12.31 ENCOUNTER FOR SCREENING MAMMOGRAM FOR BREAST CANCER: ICD-10-CM

## 2025-03-26 PROCEDURE — 77063 BREAST TOMOSYNTHESIS BI: CPT

## 2025-03-26 PROCEDURE — 77067 SCR MAMMO BI INCL CAD: CPT

## 2025-04-09 ENCOUNTER — HOSPITAL ENCOUNTER (OUTPATIENT)
Dept: BONE DENSITY | Facility: IMAGING CENTER | Age: 70
Discharge: HOME | End: 2025-04-09
Payer: COMMERCIAL

## 2025-04-09 VITALS — WEIGHT: 189.8 LBS | HEIGHT: 61 IN | BODY MASS INDEX: 35.83 KG/M2

## 2025-04-09 DIAGNOSIS — Z78.0 POST-MENOPAUSAL: ICD-10-CM

## 2025-04-09 PROCEDURE — 77080 DXA BONE DENSITY AXIAL: CPT

## 2025-04-17 ENCOUNTER — TELEPHONE (OUTPATIENT)
Age: 70
End: 2025-04-17

## 2025-04-17 NOTE — TELEPHONE ENCOUNTER
Patient called to discuss Dexa scan results.  Did let her know the results are still in process and office will call her once results are in chart.    Thank you

## 2025-04-25 DIAGNOSIS — M85.80 OSTEOPENIA, UNSPECIFIED LOCATION: Primary | ICD-10-CM

## 2025-04-25 RX ORDER — ALENDRONATE SODIUM 70 MG/1
70 TABLET ORAL
Qty: 12 TABLET | Refills: 3 | Status: SHIPPED | OUTPATIENT
Start: 2025-04-25

## 2025-04-25 NOTE — TELEPHONE ENCOUNTER
Patient would liken her Fosamax to be send to [facility] [place].    Thank you!!

## 2025-04-25 NOTE — TELEPHONE ENCOUNTER
Pt is looking for status of dxa scan completed 4/9; advised still with Denise for review.     Please contact to advise once resulted by provider.

## 2025-05-27 ENCOUNTER — TELEPHONE (OUTPATIENT)
Age: 70
End: 2025-05-27

## 2025-05-27 NOTE — TELEPHONE ENCOUNTER
Split ear lobe. Patient did not know where she is going or Dr. Vincent. She did not talk to jorje about it. She will call back with all the information including Dr. Vincent and [facility].  Thank you!!

## 2025-06-03 ENCOUNTER — TELEPHONE (OUTPATIENT)
Age: 70
End: 2025-06-03

## 2025-06-03 NOTE — TELEPHONE ENCOUNTER
Received call from patient stating she wanted to kiana an appt with Dr Judd for ear lobe repair.    I told patient the appt is not covered by insurance. She will pay 150.00 at the day of the consult for the consult and then 350 per ear lobe.    Patient stated she will call back.

## 2025-06-03 NOTE — TELEPHONE ENCOUNTER
Pt called and stated she will have Medicare instead of Medicaid. Encouraged pt to call back when she has the card so we can update her insurance information in her chart. Pt stated she thinks she needs the referral but was unsure. I advised pt to call Medicare to inquire.

## 2025-06-05 NOTE — TELEPHONE ENCOUNTER
The patient called back for more information, she took down the different addresses of our many locations and will communicate with her friend to see which location would be more convenient. She wanted to schedule with Dr. Judd but the schedule only shows her in [place] and [facility]. She will also consider possibly scheduling with another doctor in [place].     Patient will call back to schedule.

## 2025-06-09 ENCOUNTER — TELEPHONE (OUTPATIENT)
Dept: OTHER | Facility: HOSPITAL | Age: 70
End: 2025-06-09

## 2025-06-09 NOTE — TELEPHONE ENCOUNTER
Patient called requesting refill for alendronate 70mg. Patient made aware medication was refilled on 4/25/25 for 12 with 2 refills to [facility] pharmacy. Patient instructed to contact the pharmacy and speak with someone directly to obtain refills of medication. Patient advised to call back for refill if their pharmacy is unable to assist them. Patient verbalized understanding.

## 2025-07-07 ENCOUNTER — COSMETIC (OUTPATIENT)
Dept: PLASTIC SURGERY | Facility: CLINIC | Age: 70
End: 2025-07-07

## 2025-07-07 VITALS
BODY MASS INDEX: 37.97 KG/M2 | DIASTOLIC BLOOD PRESSURE: 86 MMHG | HEIGHT: 60 IN | HEART RATE: 81 BPM | SYSTOLIC BLOOD PRESSURE: 134 MMHG | TEMPERATURE: 98.3 F | WEIGHT: 193.4 LBS

## 2025-07-07 DIAGNOSIS — Z41.1 ENCOUNTER FOR COSMETIC PROCEDURE: Primary | ICD-10-CM

## 2025-07-07 PROCEDURE — COSCON COSMETIC CONSULTATION: Performed by: STUDENT IN AN ORGANIZED HEALTH CARE EDUCATION/TRAINING PROGRAM

## 2025-07-07 NOTE — PROGRESS NOTES
PRS Note    Patient presents for left split earlobe.    Discussed repair of left split earlobe with complex closure.    Discussed usage of sutures and removal in 10-14 days    Patient is not a smoker, no blood thinners, no steroids, no history of surgery.  No other pertinent medical history for local procedure.      Will call to setup left split earlobe repair in clinic under local anesthesia.    Eladio Dudley MD   [facility]   [address] [place] [address], [address]   [place] [address]   Office: [phone]

## 2025-07-22 ENCOUNTER — OFFICE VISIT (OUTPATIENT)
Dept: FAMILY MEDICINE CLINIC | Facility: CLINIC | Age: 70
End: 2025-07-22
Payer: COMMERCIAL

## 2025-07-22 VITALS
OXYGEN SATURATION: 97 % | WEIGHT: 191.8 LBS | HEART RATE: 84 BPM | SYSTOLIC BLOOD PRESSURE: 138 MMHG | BODY MASS INDEX: 36.21 KG/M2 | HEIGHT: 61 IN | TEMPERATURE: 98.4 F | DIASTOLIC BLOOD PRESSURE: 90 MMHG | RESPIRATION RATE: 16 BRPM

## 2025-07-22 DIAGNOSIS — M85.80 OSTEOPENIA, UNSPECIFIED LOCATION: ICD-10-CM

## 2025-07-22 DIAGNOSIS — B34.9 VIRAL SYNDROME: Primary | ICD-10-CM

## 2025-07-22 PROCEDURE — 99214 OFFICE O/P EST MOD 30 MIN: CPT | Performed by: FAMILY MEDICINE

## 2025-07-22 PROCEDURE — G2211 COMPLEX E/M VISIT ADD ON: HCPCS | Performed by: FAMILY MEDICINE

## 2025-07-22 RX ORDER — ALENDRONATE SODIUM 70 MG/1
70 TABLET ORAL
Qty: 12 TABLET | Refills: 3 | Status: SHIPPED | OUTPATIENT
Start: 2025-07-22

## 2025-07-22 NOTE — PATIENT INSTRUCTIONS
Call us if you become really bad or if you are cold is not totally gone after a full 2 weeks  Otherwise recheck as scheduled or needed

## 2025-08-20 ENCOUNTER — COSMETIC (OUTPATIENT)
Age: 70
End: 2025-08-20

## 2025-08-20 VITALS
SYSTOLIC BLOOD PRESSURE: 118 MMHG | BODY MASS INDEX: 29.27 KG/M2 | WEIGHT: 155 LBS | DIASTOLIC BLOOD PRESSURE: 82 MMHG | TEMPERATURE: 96.7 F | HEART RATE: 71 BPM | HEIGHT: 61 IN

## 2025-08-20 DIAGNOSIS — Z41.1 ENCOUNTER FOR COSMETIC PROCEDURE: Primary | ICD-10-CM

## 2025-08-20 PROCEDURE — 13151 CMPLX RPR E/N/E/L 1.1-2.5 CM: CPT | Performed by: STUDENT IN AN ORGANIZED HEALTH CARE EDUCATION/TRAINING PROGRAM
